# Patient Record
Sex: FEMALE | Race: WHITE | NOT HISPANIC OR LATINO | Employment: OTHER | URBAN - METROPOLITAN AREA
[De-identification: names, ages, dates, MRNs, and addresses within clinical notes are randomized per-mention and may not be internally consistent; named-entity substitution may affect disease eponyms.]

---

## 2017-03-17 ENCOUNTER — ALLSCRIPTS OFFICE VISIT (OUTPATIENT)
Dept: OTHER | Facility: OTHER | Age: 62
End: 2017-03-17

## 2017-03-17 DIAGNOSIS — Z00.00 ENCOUNTER FOR GENERAL ADULT MEDICAL EXAMINATION WITHOUT ABNORMAL FINDINGS: ICD-10-CM

## 2017-03-17 DIAGNOSIS — E11.9 TYPE 2 DIABETES MELLITUS WITHOUT COMPLICATIONS (HCC): ICD-10-CM

## 2017-04-20 ENCOUNTER — GENERIC CONVERSION - ENCOUNTER (OUTPATIENT)
Dept: OTHER | Facility: OTHER | Age: 62
End: 2017-04-20

## 2017-05-18 ENCOUNTER — GENERIC CONVERSION - ENCOUNTER (OUTPATIENT)
Dept: OTHER | Facility: OTHER | Age: 62
End: 2017-05-18

## 2017-05-31 ENCOUNTER — APPOINTMENT (OUTPATIENT)
Dept: LAB | Facility: CLINIC | Age: 62
End: 2017-05-31
Payer: COMMERCIAL

## 2017-05-31 ENCOUNTER — TRANSCRIBE ORDERS (OUTPATIENT)
Dept: LAB | Facility: CLINIC | Age: 62
End: 2017-05-31

## 2017-05-31 DIAGNOSIS — E11.9 TYPE 2 DIABETES MELLITUS WITHOUT COMPLICATIONS (HCC): ICD-10-CM

## 2017-05-31 LAB
ALBUMIN SERPL BCP-MCNC: 3.6 G/DL (ref 3.5–5)
ALP SERPL-CCNC: 129 U/L (ref 46–116)
ALT SERPL W P-5'-P-CCNC: 42 U/L (ref 12–78)
ANION GAP SERPL CALCULATED.3IONS-SCNC: 6 MMOL/L (ref 4–13)
AST SERPL W P-5'-P-CCNC: 24 U/L (ref 5–45)
BASOPHILS # BLD AUTO: 0.03 THOUSANDS/ΜL (ref 0–0.1)
BASOPHILS NFR BLD AUTO: 1 % (ref 0–1)
BILIRUB SERPL-MCNC: 0.66 MG/DL (ref 0.2–1)
BUN SERPL-MCNC: 15 MG/DL (ref 5–25)
CALCIUM SERPL-MCNC: 9.8 MG/DL (ref 8.3–10.1)
CHLORIDE SERPL-SCNC: 105 MMOL/L (ref 100–108)
CHOLEST SERPL-MCNC: 156 MG/DL (ref 50–200)
CO2 SERPL-SCNC: 27 MMOL/L (ref 21–32)
CREAT SERPL-MCNC: 0.79 MG/DL (ref 0.6–1.3)
CREAT UR-MCNC: 39 MG/DL
EOSINOPHIL # BLD AUTO: 0.18 THOUSAND/ΜL (ref 0–0.61)
EOSINOPHIL NFR BLD AUTO: 3 % (ref 0–6)
ERYTHROCYTE [DISTWIDTH] IN BLOOD BY AUTOMATED COUNT: 13.3 % (ref 11.6–15.1)
EST. AVERAGE GLUCOSE BLD GHB EST-MCNC: 148 MG/DL
GFR SERPL CREATININE-BSD FRML MDRD: >60 ML/MIN/1.73SQ M
GLUCOSE P FAST SERPL-MCNC: 126 MG/DL (ref 65–99)
HBA1C MFR BLD: 6.8 % (ref 4.2–6.3)
HCT VFR BLD AUTO: 41.4 % (ref 34.8–46.1)
HDLC SERPL-MCNC: 45 MG/DL (ref 40–60)
HGB BLD-MCNC: 13.7 G/DL (ref 11.5–15.4)
LDLC SERPL CALC-MCNC: 83 MG/DL (ref 0–100)
LYMPHOCYTES # BLD AUTO: 2.16 THOUSANDS/ΜL (ref 0.6–4.47)
LYMPHOCYTES NFR BLD AUTO: 41 % (ref 14–44)
MCH RBC QN AUTO: 31.4 PG (ref 26.8–34.3)
MCHC RBC AUTO-ENTMCNC: 33.1 G/DL (ref 31.4–37.4)
MCV RBC AUTO: 95 FL (ref 82–98)
MICROALBUMIN UR-MCNC: <5 MG/L (ref 0–20)
MICROALBUMIN/CREAT 24H UR: <13 MG/G CREATININE (ref 0–30)
MONOCYTES # BLD AUTO: 0.43 THOUSAND/ΜL (ref 0.17–1.22)
MONOCYTES NFR BLD AUTO: 8 % (ref 4–12)
NEUTROPHILS # BLD AUTO: 2.52 THOUSANDS/ΜL (ref 1.85–7.62)
NEUTS SEG NFR BLD AUTO: 47 % (ref 43–75)
NRBC BLD AUTO-RTO: 0 /100 WBCS
PLATELET # BLD AUTO: 235 THOUSANDS/UL (ref 149–390)
PMV BLD AUTO: 9.7 FL (ref 8.9–12.7)
POTASSIUM SERPL-SCNC: 4.3 MMOL/L (ref 3.5–5.3)
PROT SERPL-MCNC: 7.3 G/DL (ref 6.4–8.2)
RBC # BLD AUTO: 4.36 MILLION/UL (ref 3.81–5.12)
SODIUM SERPL-SCNC: 138 MMOL/L (ref 136–145)
TRIGL SERPL-MCNC: 141 MG/DL
TSH SERPL DL<=0.05 MIU/L-ACNC: 2.23 UIU/ML (ref 0.36–3.74)
WBC # BLD AUTO: 5.33 THOUSAND/UL (ref 4.31–10.16)

## 2017-05-31 PROCEDURE — 80053 COMPREHEN METABOLIC PANEL: CPT

## 2017-05-31 PROCEDURE — 82570 ASSAY OF URINE CREATININE: CPT

## 2017-05-31 PROCEDURE — 84443 ASSAY THYROID STIM HORMONE: CPT

## 2017-05-31 PROCEDURE — 80061 LIPID PANEL: CPT

## 2017-05-31 PROCEDURE — 83036 HEMOGLOBIN GLYCOSYLATED A1C: CPT

## 2017-05-31 PROCEDURE — 82043 UR ALBUMIN QUANTITATIVE: CPT

## 2017-05-31 PROCEDURE — 85025 COMPLETE CBC W/AUTO DIFF WBC: CPT

## 2017-05-31 PROCEDURE — 36415 COLL VENOUS BLD VENIPUNCTURE: CPT

## 2017-06-01 ENCOUNTER — ALLSCRIPTS OFFICE VISIT (OUTPATIENT)
Dept: OTHER | Facility: OTHER | Age: 62
End: 2017-06-01

## 2017-06-02 ENCOUNTER — ALLSCRIPTS OFFICE VISIT (OUTPATIENT)
Dept: OTHER | Facility: OTHER | Age: 62
End: 2017-06-02

## 2017-06-02 DIAGNOSIS — R74.8 ABNORMAL LEVELS OF OTHER SERUM ENZYMES: ICD-10-CM

## 2017-06-02 DIAGNOSIS — Z90.49 ACQUIRED ABSENCE OF OTHER SPECIFIED PARTS OF DIGESTIVE TRACT: ICD-10-CM

## 2017-06-02 DIAGNOSIS — R17 JAUNDICE: ICD-10-CM

## 2017-06-19 ENCOUNTER — ANESTHESIA EVENT (OUTPATIENT)
Dept: GASTROENTEROLOGY | Facility: HOSPITAL | Age: 62
End: 2017-06-19
Payer: COMMERCIAL

## 2017-06-20 ENCOUNTER — ANESTHESIA (OUTPATIENT)
Dept: GASTROENTEROLOGY | Facility: HOSPITAL | Age: 62
End: 2017-06-20
Payer: COMMERCIAL

## 2017-06-20 ENCOUNTER — GENERIC CONVERSION - ENCOUNTER (OUTPATIENT)
Dept: PERIOP | Facility: HOSPITAL | Age: 62
End: 2017-06-20

## 2017-06-20 ENCOUNTER — HOSPITAL ENCOUNTER (OUTPATIENT)
Facility: HOSPITAL | Age: 62
Setting detail: OUTPATIENT SURGERY
Discharge: HOME | End: 2017-06-20
Attending: SURGERY | Admitting: SURGERY
Payer: COMMERCIAL

## 2017-06-20 VITALS
HEIGHT: 66 IN | HEART RATE: 64 BPM | DIASTOLIC BLOOD PRESSURE: 58 MMHG | OXYGEN SATURATION: 98 % | SYSTOLIC BLOOD PRESSURE: 122 MMHG | TEMPERATURE: 97.5 F | BODY MASS INDEX: 34.39 KG/M2 | RESPIRATION RATE: 18 BRPM | WEIGHT: 214 LBS

## 2017-06-20 RX ORDER — FLUTICASONE PROPIONATE 50 MCG
2 SPRAY, SUSPENSION (ML) NASAL DAILY
COMMUNITY
End: 2018-09-17 | Stop reason: SDUPTHER

## 2017-06-20 RX ORDER — CETIRIZINE HYDROCHLORIDE 10 MG/1
10 TABLET ORAL DAILY
COMMUNITY

## 2017-06-20 RX ORDER — PROPOFOL 10 MG/ML
INJECTION, EMULSION INTRAVENOUS AS NEEDED
Status: DISCONTINUED | OUTPATIENT
Start: 2017-06-20 | End: 2017-06-20 | Stop reason: SURG

## 2017-06-20 RX ORDER — SERTRALINE HYDROCHLORIDE 25 MG/1
25 TABLET, FILM COATED ORAL DAILY
COMMUNITY
End: 2018-03-06 | Stop reason: SDUPTHER

## 2017-06-20 RX ORDER — SODIUM CHLORIDE 9 MG/ML
125 INJECTION, SOLUTION INTRAVENOUS CONTINUOUS
Status: DISCONTINUED | OUTPATIENT
Start: 2017-06-20 | End: 2017-06-20 | Stop reason: HOSPADM

## 2017-06-20 RX ORDER — VERAPAMIL HYDROCHLORIDE 120 MG/1
120 TABLET, FILM COATED ORAL DAILY
COMMUNITY
End: 2018-03-06 | Stop reason: SDUPTHER

## 2017-06-20 RX ORDER — LOSARTAN POTASSIUM AND HYDROCHLOROTHIAZIDE 25; 100 MG/1; MG/1
1 TABLET ORAL DAILY
COMMUNITY
End: 2018-03-06 | Stop reason: SDUPTHER

## 2017-06-20 RX ORDER — FAMOTIDINE 20 MG/1
20 TABLET, FILM COATED ORAL DAILY PRN
COMMUNITY

## 2017-06-20 RX ADMIN — PROPOFOL 20 MG: 10 INJECTION, EMULSION INTRAVENOUS at 10:41

## 2017-06-20 RX ADMIN — PROPOFOL 20 MG: 10 INJECTION, EMULSION INTRAVENOUS at 10:45

## 2017-06-20 RX ADMIN — PROPOFOL 20 MG: 10 INJECTION, EMULSION INTRAVENOUS at 10:48

## 2017-06-20 RX ADMIN — PROPOFOL 50 MG: 10 INJECTION, EMULSION INTRAVENOUS at 10:39

## 2017-06-20 RX ADMIN — PROPOFOL 50 MG: 10 INJECTION, EMULSION INTRAVENOUS at 10:38

## 2017-06-20 RX ADMIN — PROPOFOL 20 MG: 10 INJECTION, EMULSION INTRAVENOUS at 10:49

## 2017-06-20 RX ADMIN — PROPOFOL 20 MG: 10 INJECTION, EMULSION INTRAVENOUS at 10:43

## 2017-06-20 RX ADMIN — SODIUM CHLORIDE 125 ML/HR: 0.9 INJECTION, SOLUTION INTRAVENOUS at 10:20

## 2017-06-20 RX ADMIN — PROPOFOL 50 MG: 10 INJECTION, EMULSION INTRAVENOUS at 10:37

## 2017-06-20 RX ADMIN — PROPOFOL 10 MG: 10 INJECTION, EMULSION INTRAVENOUS at 10:40

## 2017-06-21 ENCOUNTER — GENERIC CONVERSION - ENCOUNTER (OUTPATIENT)
Dept: OTHER | Facility: OTHER | Age: 62
End: 2017-06-21

## 2017-06-26 ENCOUNTER — APPOINTMENT (OUTPATIENT)
Dept: RADIOLOGY | Facility: CLINIC | Age: 62
End: 2017-06-26
Payer: COMMERCIAL

## 2017-06-26 ENCOUNTER — APPOINTMENT (OUTPATIENT)
Dept: LAB | Facility: CLINIC | Age: 62
End: 2017-06-26
Payer: COMMERCIAL

## 2017-06-26 ENCOUNTER — TRANSCRIBE ORDERS (OUTPATIENT)
Dept: LAB | Facility: CLINIC | Age: 62
End: 2017-06-26

## 2017-06-26 DIAGNOSIS — E11.9 TYPE 2 DIABETES MELLITUS WITHOUT COMPLICATIONS (HCC): ICD-10-CM

## 2017-06-26 DIAGNOSIS — R17 JAUNDICE: ICD-10-CM

## 2017-06-26 DIAGNOSIS — I34.0 NONRHEUMATIC MITRAL VALVE INSUFFICIENCY: ICD-10-CM

## 2017-06-26 DIAGNOSIS — R74.8 ABNORMAL LEVELS OF OTHER SERUM ENZYMES: ICD-10-CM

## 2017-06-26 DIAGNOSIS — R74.8 OTHER NONSPECIFIC ABNORMAL SERUM ENZYME LEVELS: ICD-10-CM

## 2017-06-26 DIAGNOSIS — E87.1 HYPOSMOLALITY AND/OR HYPONATREMIA: ICD-10-CM

## 2017-06-26 DIAGNOSIS — E87.1 HYPO-OSMOLALITY AND HYPONATREMIA: ICD-10-CM

## 2017-06-26 DIAGNOSIS — E11.9 DIABETES MELLITUS WITH NO COMPLICATION (HCC): Primary | ICD-10-CM

## 2017-06-26 DIAGNOSIS — IMO0001: ICD-10-CM

## 2017-06-26 DIAGNOSIS — I34.1 J.B. BARLOW'S SYNDROME: ICD-10-CM

## 2017-06-26 DIAGNOSIS — I10 ESSENTIAL (PRIMARY) HYPERTENSION: ICD-10-CM

## 2017-06-26 DIAGNOSIS — I10 ESSENTIAL HYPERTENSION, MALIGNANT: ICD-10-CM

## 2017-06-26 LAB
APTT PPP: 25 SECONDS (ref 23–35)
GGT SERPL-CCNC: 29 U/L (ref 5–85)
INR PPP: 0.97 (ref 0.86–1.16)
PROTHROMBIN TIME: 12.9 SECONDS (ref 12.1–14.4)

## 2017-06-26 PROCEDURE — 86256 FLUORESCENT ANTIBODY TITER: CPT

## 2017-06-26 PROCEDURE — 86704 HEP B CORE ANTIBODY TOTAL: CPT

## 2017-06-26 PROCEDURE — 80074 ACUTE HEPATITIS PANEL: CPT

## 2017-06-26 PROCEDURE — 85730 THROMBOPLASTIN TIME PARTIAL: CPT

## 2017-06-26 PROCEDURE — 86255 FLUORESCENT ANTIBODY SCREEN: CPT

## 2017-06-26 PROCEDURE — 82977 ASSAY OF GGT: CPT

## 2017-06-26 PROCEDURE — 86038 ANTINUCLEAR ANTIBODIES: CPT

## 2017-06-26 PROCEDURE — 36415 COLL VENOUS BLD VENIPUNCTURE: CPT

## 2017-06-26 PROCEDURE — 85610 PROTHROMBIN TIME: CPT

## 2017-06-26 PROCEDURE — 71020 HB CHEST X-RAY 2VW FRONTAL&LATL: CPT

## 2017-06-26 PROCEDURE — 86235 NUCLEAR ANTIGEN ANTIBODY: CPT

## 2017-06-27 LAB
ACTIN IGG SERPL-ACNC: 11 UNITS (ref 0–19)
HAV IGM SER QL: NORMAL
HBV CORE AB SER QL: NORMAL
HBV CORE IGM SER QL: NORMAL
HBV CORE IGM SER QL: NORMAL
HBV SURFACE AG SER QL: NORMAL
HBV SURFACE AG SER QL: NORMAL
HCV AB SER QL: NORMAL
HCV AB SER QL: NORMAL
MITOCHONDRIA M2 IGG SER-ACNC: 2 UNITS (ref 0–20)

## 2017-06-28 LAB
C-ANCA TITR SER IF: NORMAL TITER
MYELOPEROXIDASE AB SER IA-ACNC: <9 U/ML (ref 0–9)
P-ANCA ATYPICAL TITR SER IF: NORMAL TITER
P-ANCA TITR SER IF: NORMAL TITER
PROTEINASE3 AB SER IA-ACNC: <3.5 U/ML (ref 0–3.5)
RYE IGE QN: NEGATIVE

## 2017-07-11 ENCOUNTER — GENERIC CONVERSION - ENCOUNTER (OUTPATIENT)
Dept: OTHER | Facility: OTHER | Age: 62
End: 2017-07-11

## 2017-07-13 ENCOUNTER — HOSPITAL ENCOUNTER (OUTPATIENT)
Dept: PULMONOLOGY | Facility: HOSPITAL | Age: 62
Discharge: HOME | End: 2017-07-13
Payer: COMMERCIAL

## 2017-07-13 ENCOUNTER — GENERIC CONVERSION - ENCOUNTER (OUTPATIENT)
Dept: OTHER | Facility: OTHER | Age: 62
End: 2017-07-13

## 2017-07-13 ENCOUNTER — OFFICE VISIT (OUTPATIENT)
Dept: LAB | Facility: HOSPITAL | Age: 62
End: 2017-07-13
Payer: COMMERCIAL

## 2017-07-13 ENCOUNTER — HOSPITAL ENCOUNTER (OUTPATIENT)
Dept: RADIOLOGY | Facility: HOSPITAL | Age: 62
Discharge: HOME | End: 2017-07-13
Payer: COMMERCIAL

## 2017-07-13 DIAGNOSIS — E87.1 HYPOSMOLALITY AND/OR HYPONATREMIA: ICD-10-CM

## 2017-07-13 DIAGNOSIS — IMO0001: ICD-10-CM

## 2017-07-13 DIAGNOSIS — R17 JAUNDICE: ICD-10-CM

## 2017-07-13 DIAGNOSIS — R74.8 ABNORMAL LEVELS OF OTHER SERUM ENZYMES: ICD-10-CM

## 2017-07-13 DIAGNOSIS — I34.1 J.B. BARLOW'S SYNDROME: ICD-10-CM

## 2017-07-13 DIAGNOSIS — E11.9 DIABETES MELLITUS WITH NO COMPLICATION (HCC): ICD-10-CM

## 2017-07-13 DIAGNOSIS — Z90.49 ACQUIRED ABSENCE OF OTHER SPECIFIED PARTS OF DIGESTIVE TRACT: ICD-10-CM

## 2017-07-13 DIAGNOSIS — I10 ESSENTIAL HYPERTENSION, MALIGNANT: ICD-10-CM

## 2017-07-13 LAB
ATRIAL RATE: 63 BPM
P AXIS: 40 DEGREES
PR INTERVAL: 178 MS
QRS AXIS: 31 DEGREES
QRSD INTERVAL: 82 MS
QT INTERVAL: 404 MS
QTC INTERVAL: 413 MS
T WAVE AXIS: 14 DEGREES
VENTRICULAR RATE: 63 BPM

## 2017-07-13 PROCEDURE — 94760 N-INVAS EAR/PLS OXIMETRY 1: CPT

## 2017-07-13 PROCEDURE — 76700 US EXAM ABDOM COMPLETE: CPT

## 2017-07-13 PROCEDURE — 94729 DIFFUSING CAPACITY: CPT

## 2017-07-13 PROCEDURE — 93005 ELECTROCARDIOGRAM TRACING: CPT

## 2017-07-13 PROCEDURE — 94726 PLETHYSMOGRAPHY LUNG VOLUMES: CPT

## 2017-07-13 PROCEDURE — 94010 BREATHING CAPACITY TEST: CPT

## 2017-09-13 ENCOUNTER — ALLSCRIPTS OFFICE VISIT (OUTPATIENT)
Dept: OTHER | Facility: OTHER | Age: 62
End: 2017-09-13

## 2017-09-13 DIAGNOSIS — E11.9 TYPE 2 DIABETES MELLITUS WITHOUT COMPLICATIONS (HCC): ICD-10-CM

## 2017-09-13 DIAGNOSIS — I10 ESSENTIAL (PRIMARY) HYPERTENSION: ICD-10-CM

## 2018-01-09 NOTE — MISCELLANEOUS
Message  Mailed colono letter to patients home address  Tasked PCPs office  {Updated pre-visit planning to reflect recommended 5 year follow up  }      Active Problems    1  Alkaline phosphatase elevation (790 5) (R74 8)   2  Chronic pain of right ankle (719 47,338 29) (M25 571,G89 29)   3  Colon cancer screening (V76 51) (Z12 11)   4  De Quervain's tenosynovitis, left (727 04) (M65 4)   5  Depression (311) (F32 9)   6  Diverticulosis (562 10) (K57 90)   7  DMII (diabetes mellitus, type 2) (250 00) (E11 9)   8  Elevated bilirubin (277 4) (R17)   9  Encounter for routine gynecological examination (V72 31) (Z01 419)   10  Encounter for screening for malignant neoplasm of cervix (V76 2) (Z12 4)   11  Esophageal reflux (530 81) (K21 9)   12  Fracture of right heel (825 0) (S92 001A)   13  Hypertension (401 9) (I10)   14  Hyponatremia (276 1) (E87 1)   15  Joint derangement of hand (718 94) (M24 9)   16  Left ventricular hypertrophy (429 3) (I51 7)   17  Need for influenza vaccination (V04 81) (Z23)   18  Need for pneumococcal vaccination (V03 82) (Z23)   19  Need for prophylactic vaccination and inoculation against influenza (V04 81) (Z23)   20  Need for Tdap vaccination (V06 1) (Z23)   21  Onychomycosis (110 1) (B35 1)   22  Seasonal allergic rhinitis (477 9) (J30 2)   23  Secondary mitral valve insufficiency (424 0) (I34 0)   24  Status post cholecystectomy (V45 79) (Z90 49)   25  Stiffness of right ankle joint (719 57) (M25 671)   26  Uterovaginal prolapse (618 4) (N81 4)   27  Visit for screening mammogram (V76 12) (Z12 31)   28  Wrist pain (719 43) (M25 539)    Current Meds   1  Calcium TABS; Therapy: (Recorded:26Mar2014) to Recorded   2  Fluticasone Propionate 50 MCG/ACT Nasal Suspension; USE 2 SPRAYS IN EACH   NOSTRIL ONCE DAILY; Therapy: 53RHT4996 to (Evaluate:08Mar2017)  Requested for: 80PYZ8565; Last   Rx:49Dja1217 Ordered   3   Losartan Potassium-HCTZ 100-25 MG Oral Tablet; TAKE 1 TABLET DAILY; Therapy: 20Hlp9129 to (Evaluate:13Sep2017)  Requested for: 79KEZ5536; Last   Rx:17Mar2017 Ordered   4  Lumigan 0 01 % Ophthalmic Solution; Therapy: (Recorded:01Jun2017) to Recorded   5  MoviPrep 100 GM Oral Solution Reconstituted; USE AS DIRECTED; Therapy: 16JNC0669 to (Last Rx:01Jun2017) Ordered   6  One Daily Womens Oral Tablet; Therapy: (Recorded:26Mar2014) to Recorded   7  Pepcid AC TABS; Therapy: (Recorded:26Mar2014) to Recorded   8  Sertraline HCl - 50 MG Oral Tablet; TAKE 1 TABLET Bedtime; Therapy: 76ERU1749 to (Evaluate:15Jun2017)  Requested for: 56XSI9292; Last   Rx:17Mar2017 Ordered   9  Verapamil HCl  MG Oral Tablet Extended Release; TAKE ONE TABLET BY   MOUTH ONCE DAILY; Therapy: 53Cft1183 to (Evaluate:13Sep2017)  Requested for: 11MIQ4409; Last   Rx:17Mar2017 Ordered   10  Vitamin B Complex Oral Tablet; Therapy: (Recorded:26Mar2014) to Recorded   11  Voltaren 1 % Transdermal Gel (Diclofenac Sodium); APPLY SPARINGLY TO AFFECTED    AREA(S) ONCE DAILY  Requested for: 35Cwz5693; Last Rx:03Sep2015 Ordered    Allergies    1  No Known Drug Allergies    Plan  Colon cancer screening    · COLONOSCOPY (GI, SURG); Status:Complete - Retrospective By Protocol  Authorization;   Done: 46XFT9711 12:00AM   · COLONOSCOPY (GI, SURG) ; every 5 years;  Last 20Jun2017; Next 21VAX4580;  Status:Active    Signatures   Electronically signed by : Eliel Kenny, ; Jul 11 2017  4:49PM EST                       (Author)

## 2018-01-13 VITALS
HEIGHT: 65 IN | SYSTOLIC BLOOD PRESSURE: 140 MMHG | WEIGHT: 212 LBS | HEART RATE: 66 BPM | TEMPERATURE: 98.2 F | DIASTOLIC BLOOD PRESSURE: 86 MMHG | BODY MASS INDEX: 35.32 KG/M2 | OXYGEN SATURATION: 98 %

## 2018-01-13 VITALS
SYSTOLIC BLOOD PRESSURE: 130 MMHG | HEIGHT: 66 IN | RESPIRATION RATE: 16 BRPM | HEART RATE: 74 BPM | DIASTOLIC BLOOD PRESSURE: 74 MMHG | WEIGHT: 211 LBS | TEMPERATURE: 98.3 F | BODY MASS INDEX: 33.91 KG/M2

## 2018-01-13 VITALS
OXYGEN SATURATION: 97 % | BODY MASS INDEX: 33.95 KG/M2 | DIASTOLIC BLOOD PRESSURE: 70 MMHG | HEART RATE: 86 BPM | TEMPERATURE: 98.5 F | WEIGHT: 211.25 LBS | SYSTOLIC BLOOD PRESSURE: 140 MMHG | HEIGHT: 66 IN | RESPIRATION RATE: 16 BRPM

## 2018-01-14 VITALS
WEIGHT: 213 LBS | TEMPERATURE: 98 F | HEART RATE: 70 BPM | BODY MASS INDEX: 34.23 KG/M2 | SYSTOLIC BLOOD PRESSURE: 134 MMHG | DIASTOLIC BLOOD PRESSURE: 78 MMHG | HEIGHT: 66 IN | OXYGEN SATURATION: 98 %

## 2018-03-06 ENCOUNTER — OFFICE VISIT (OUTPATIENT)
Dept: FAMILY MEDICINE CLINIC | Facility: CLINIC | Age: 63
End: 2018-03-06
Payer: COMMERCIAL

## 2018-03-06 VITALS
BODY MASS INDEX: 35.89 KG/M2 | SYSTOLIC BLOOD PRESSURE: 128 MMHG | RESPIRATION RATE: 17 BRPM | OXYGEN SATURATION: 97 % | DIASTOLIC BLOOD PRESSURE: 82 MMHG | HEART RATE: 70 BPM | TEMPERATURE: 98 F | HEIGHT: 64 IN | WEIGHT: 210.2 LBS

## 2018-03-06 DIAGNOSIS — Z00.00 ANNUAL PHYSICAL EXAM: ICD-10-CM

## 2018-03-06 DIAGNOSIS — F32.A DEPRESSION, UNSPECIFIED DEPRESSION TYPE: ICD-10-CM

## 2018-03-06 DIAGNOSIS — I10 ESSENTIAL HYPERTENSION: Primary | ICD-10-CM

## 2018-03-06 PROCEDURE — 99396 PREV VISIT EST AGE 40-64: CPT | Performed by: FAMILY MEDICINE

## 2018-03-06 RX ORDER — BIMATOPROST 0.01 %
DROPS OPHTHALMIC (EYE)
COMMUNITY
Start: 2017-11-27 | End: 2018-11-28

## 2018-03-06 RX ORDER — VERAPAMIL HYDROCHLORIDE 120 MG/1
120 TABLET, FILM COATED ORAL DAILY
Qty: 90 TABLET | Refills: 1 | Status: SHIPPED | OUTPATIENT
Start: 2018-03-06 | End: 2018-03-11 | Stop reason: CLARIF

## 2018-03-06 RX ORDER — SERTRALINE HYDROCHLORIDE 25 MG/1
50 TABLET, FILM COATED ORAL DAILY
Qty: 90 TABLET | Refills: 1 | Status: SHIPPED | OUTPATIENT
Start: 2018-03-06 | End: 2018-04-30 | Stop reason: SDUPTHER

## 2018-03-06 RX ORDER — LOSARTAN POTASSIUM AND HYDROCHLOROTHIAZIDE 25; 100 MG/1; MG/1
1 TABLET ORAL DAILY
Qty: 90 TABLET | Refills: 1 | Status: SHIPPED | OUTPATIENT
Start: 2018-03-06 | End: 2018-11-28 | Stop reason: SDUPTHER

## 2018-03-06 NOTE — PROGRESS NOTES
Assessment/Plan:       Diagnoses and all orders for this visit:    Essential hypertension  -     verapamil (CALAN) 120 mg tablet; Take 1 tablet (120 mg total) by mouth daily  -     losartan-hydrochlorothiazide (HYZAAR) 100-25 MG per tablet; Take 1 tablet by mouth daily    Depression, unspecified depression type  -     sertraline (ZOLOFT) 25 mg tablet; Take 2 tablets (50 mg total) by mouth daily    Annual physical exam    Other orders  -     LUMIGAN 0 01 % ophthalmic drops;   -     diclofenac sodium (VOLTAREN) 1 %; Place on the skin daily          Subjective:   Chief Complaint   Patient presents with    Annual Exam        Patient ID: Yobani Mitchell is a 58 y o  female  Per c/c reviewed by me  Sad because she had to get her mom into a personal care home- was still living alone in her 80's        The following portions of the patient's history were reviewed and updated as appropriate: allergies, current medications, past family history, past medical history, past social history, past surgical history and problem list     Review of Systems   Constitutional: Negative  HENT: Negative  Eyes: Negative  Respiratory: Negative  Cardiovascular: Negative  Endocrine: Negative  Genitourinary: Negative  Musculoskeletal: Positive for joint swelling (still post-traumatic ankle swelling on and off)  Negative for gait problem, neck pain and neck stiffness  Skin: Negative  Neurological: Negative  Hematological: Negative  Psychiatric/Behavioral: Negative  Except per hpi         Objective:      /82 (BP Location: Left arm, Patient Position: Sitting, Cuff Size: Standard)   Pulse 70   Temp 98 °F (36 7 °C)   Resp 17   Ht 5' 4" (1 626 m)   Wt 95 3 kg (210 lb 3 2 oz)   SpO2 97%   BMI 36 08 kg/m²          Physical Exam   Constitutional: She is oriented to person, place, and time  She appears well-developed and well-nourished  She is cooperative  Non-toxic appearance   She does not have a sickly appearance  She does not appear ill  No distress  HENT:   Head: Normocephalic and atraumatic  Right Ear: Hearing, tympanic membrane, external ear and ear canal normal    Left Ear: Hearing, tympanic membrane, external ear and ear canal normal    Nose: Nose normal    Mouth/Throat: Uvula is midline, oropharynx is clear and moist and mucous membranes are normal    Eyes: Conjunctivae, EOM and lids are normal  Pupils are equal, round, and reactive to light  Neck: Trachea normal  Neck supple  No JVD present  Carotid bruit is not present  No thyroid mass and no thyromegaly present  Cardiovascular: Normal rate, regular rhythm, normal heart sounds and normal pulses  Pulmonary/Chest: Effort normal and breath sounds normal    Abdominal: Soft  Normal appearance and bowel sounds are normal  There is no hepatosplenomegaly  There is no tenderness  No hernia  Musculoskeletal:        Cervical back: Normal         Thoracic back: Normal         Lumbar back: Normal    Lymphadenopathy:     She has no cervical adenopathy  She has no axillary adenopathy  Neurological: She is alert and oriented to person, place, and time  She has normal strength and normal reflexes  No cranial nerve deficit or sensory deficit  She displays a negative Romberg sign  Gait normal    Skin: Skin is warm and dry  No rash noted  She is not diaphoretic  No cyanosis  Nails show no clubbing  Psychiatric: She has a normal mood and affect  Her behavior is normal  Judgment and thought content normal  Cognition and memory are normal    Nursing note and vitals reviewed

## 2018-03-08 PROBLEM — F32.A DEPRESSION: Status: ACTIVE | Noted: 2018-03-08

## 2018-03-08 PROBLEM — I10 ESSENTIAL HYPERTENSION: Status: ACTIVE | Noted: 2018-03-08

## 2018-03-08 PROBLEM — Z00.00 ANNUAL PHYSICAL EXAM: Status: ACTIVE | Noted: 2018-03-08

## 2018-03-09 ENCOUNTER — TELEPHONE (OUTPATIENT)
Dept: FAMILY MEDICINE CLINIC | Facility: CLINIC | Age: 63
End: 2018-03-09

## 2018-03-09 NOTE — TELEPHONE ENCOUNTER
Pt called and lm on vm stating she picked up her Verapamil 120mg at [facility] and when she got home it is the wrong rx  Pt stated she is suppose to be on Verapamil 12mg Er  Pt would like a new rx sent to [facility]

## 2018-03-11 DIAGNOSIS — I10 ESSENTIAL HYPERTENSION: Primary | ICD-10-CM

## 2018-03-11 RX ORDER — VERAPAMIL HYDROCHLORIDE 120 MG/1
120 CAPSULE, EXTENDED RELEASE ORAL
Qty: 90 CAPSULE | Refills: 0 | Status: SHIPPED | OUTPATIENT
Start: 2018-03-11 | End: 2018-06-14 | Stop reason: SDUPTHER

## 2018-03-12 NOTE — TELEPHONE ENCOUNTER
in EPIC wrong- clearly is verapamil ER in allscripts   I corrected and please have pt return the wrong med to pharmacy

## 2018-04-30 ENCOUNTER — TELEPHONE (OUTPATIENT)
Dept: FAMILY MEDICINE CLINIC | Facility: CLINIC | Age: 63
End: 2018-04-30

## 2018-04-30 DIAGNOSIS — F32.A DEPRESSION, UNSPECIFIED DEPRESSION TYPE: ICD-10-CM

## 2018-04-30 NOTE — TELEPHONE ENCOUNTER
Pt called over the weekend and lm on  stating he Sertraline was sent over 25mg BID and not 50mg 1 daily  It also needs to be a 90 day supply

## 2018-04-30 NOTE — TELEPHONE ENCOUNTER
Looks like med was put into EPIC incorrectly at change-over   I switched it back to the 50mg tablets with new order to pharmacy

## 2018-06-14 ENCOUNTER — OFFICE VISIT (OUTPATIENT)
Dept: FAMILY MEDICINE CLINIC | Facility: CLINIC | Age: 63
End: 2018-06-14
Payer: COMMERCIAL

## 2018-06-14 VITALS
SYSTOLIC BLOOD PRESSURE: 132 MMHG | HEART RATE: 80 BPM | BODY MASS INDEX: 33.75 KG/M2 | HEIGHT: 66 IN | DIASTOLIC BLOOD PRESSURE: 86 MMHG | TEMPERATURE: 98.5 F | WEIGHT: 210 LBS

## 2018-06-14 DIAGNOSIS — B36.9 FUNGAL DERMATITIS: Primary | ICD-10-CM

## 2018-06-14 DIAGNOSIS — I10 ESSENTIAL HYPERTENSION: ICD-10-CM

## 2018-06-14 DIAGNOSIS — L50.9 URTICARIAL RASH: ICD-10-CM

## 2018-06-14 PROCEDURE — 99213 OFFICE O/P EST LOW 20 MIN: CPT | Performed by: PHYSICIAN ASSISTANT

## 2018-06-14 PROCEDURE — 3008F BODY MASS INDEX DOCD: CPT | Performed by: PHYSICIAN ASSISTANT

## 2018-06-14 PROCEDURE — 3079F DIAST BP 80-89 MM HG: CPT | Performed by: PHYSICIAN ASSISTANT

## 2018-06-14 PROCEDURE — 3075F SYST BP GE 130 - 139MM HG: CPT | Performed by: PHYSICIAN ASSISTANT

## 2018-06-14 RX ORDER — VERAPAMIL HYDROCHLORIDE 120 MG/1
120 CAPSULE, EXTENDED RELEASE ORAL
Qty: 90 CAPSULE | Refills: 1 | Status: SHIPPED | OUTPATIENT
Start: 2018-06-14 | End: 2018-11-28

## 2018-06-14 RX ORDER — KETOCONAZOLE 20 MG/G
CREAM TOPICAL DAILY
Qty: 30 G | Refills: 0 | Status: SHIPPED | OUTPATIENT
Start: 2018-06-14 | End: 2018-11-28

## 2018-06-14 RX ORDER — HYDROXYZINE HYDROCHLORIDE 25 MG/1
25 TABLET, FILM COATED ORAL EVERY 6 HOURS PRN
Qty: 30 TABLET | Refills: 0 | Status: SHIPPED | OUTPATIENT
Start: 2018-06-14 | End: 2018-11-28

## 2018-06-14 RX ORDER — PREDNISONE 20 MG/1
TABLET ORAL
Qty: 11 TABLET | Refills: 0 | Status: SHIPPED | OUTPATIENT
Start: 2018-06-14 | End: 2018-11-28

## 2018-06-14 NOTE — PATIENT INSTRUCTIONS
Please complete oral course of prednisone with food and water  Continue zyrtec and as needed benadryl  You may try hydroxyzine as needed for itching in replacement of benadryl but not together  You can restart pepcid in addition for zyrtec  Apply the antifungal cream to groin and try to keep the area dry  You can try Desitin powder over the counter

## 2018-06-14 NOTE — PROGRESS NOTES
Assessment/Plan:      Diagnoses and all orders for this visit:    Fungal dermatitis  -     ketoconazole (NIZORAL) 2 % cream; Apply topically daily    Essential hypertension  -     verapamil (VERELAN PM) 120 MG 24 hr capsule; Take 1 capsule (120 mg total) by mouth daily at bedtime    Urticarial rash  -     predniSONE 20 mg tablet; Take 2 tabs x 3 days, then 1 tab x 3 days, then 1/2 tab x 2 days  -     hydrOXYzine HCL (ATARAX) 25 mg tablet; Take 1 tablet (25 mg total) by mouth every 6 (six) hours as needed for itching or allergies          Subjective:     Patient ID: Priscilla Montes is a 61 y o  female  Chief Complaint   Patient presents with    Urticaria     1xday       HPI   Patient is a 60 yo female who presents with diffuse itchiness and rash in groin  Patient states that she started with urticarial rash across chest, neck, ears, arms and now spreading to breasts over the weekend  She did notice small red dots but has improved however the itchiness has been "driving me crazy, I could not stand it last night"  She also started with red, itchy rash underneath panus of groin  She takes daily zyrtec and prn benadryl but symptoms persist  She was given triamcinolone cream in the past from a dermatologist and has been using that but she is still very itchy and spreading to scalp  No new lotions, detergents, creams, soaps, shampoos  She was out in sun on Saturday but did not get burnt  She was worried about gypsy mouth caterpillars or poison  She denies fever, chills, sore throat  Review of Systems   Constitutional: Negative for activity change, appetite change, chills and fever  HENT: Negative for congestion, ear pain and sore throat  Respiratory: Negative for cough, chest tightness, shortness of breath and wheezing  Cardiovascular: Negative for chest pain and palpitations  Gastrointestinal: Negative for abdominal pain, diarrhea, nausea and vomiting     Genitourinary: Negative for difficulty urinating, dysuria and hematuria  Skin: Positive for rash  Negative for pallor and wound  Allergic/Immunologic: Positive for environmental allergies  Neurological: Negative for dizziness, syncope, weakness and light-headedness  Hematological: Negative for adenopathy  Does not bruise/bleed easily         Patient Active Problem List    Diagnosis Date Noted    Essential hypertension 03/08/2018    Depression 03/08/2018    Annual physical exam 03/08/2018    DMII (diabetes mellitus, type 2) ([facility] [address] ) 09/09/2016    Elevated bilirubin 09/09/2016    Seasonal allergic rhinitis 02/25/2016    Alkaline phosphatase elevation 05/12/2015    Chronic pain of right ankle 03/19/2015    Fracture of right heel 03/19/2015    Left ventricular hypertrophy 06/05/2014    De Quervain's tenosynovitis, left 05/23/2014    Uterovaginal prolapse 03/26/2014    Diverticulosis 07/30/2012    Esophageal reflux 07/30/2012    Secondary mitral valve insufficiency 07/30/2012     Current Outpatient Prescriptions:     B Complex-C (SUPER B-C PO), Take by mouth, Disp: , Rfl:     Bimatoprost (LUMIGAN OP), Apply 1 drop to eye daily at bedtime, Disp: , Rfl:     CALCIUM-VITAMIN D PO, Take 1 tablet by mouth daily, Disp: , Rfl:     cetirizine (ZyrTEC) 10 mg tablet, Take 10 mg by mouth daily, Disp: , Rfl:     diclofenac sodium (VOLTAREN) 1 %, Place on the skin daily, Disp: , Rfl:     famotidine (PEPCID) 20 mg tablet, Take 20 mg by mouth 2 (two) times a day, Disp: , Rfl:     fluticasone (FLONASE) 50 mcg/act nasal spray, 2 sprays into each nostril daily, Disp: , Rfl:     hydrOXYzine HCL (ATARAX) 25 mg tablet, Take 1 tablet (25 mg total) by mouth every 6 (six) hours as needed for itching or allergies, Disp: 30 tablet, Rfl: 0    ketoconazole (NIZORAL) 2 % cream, Apply topically daily, Disp: 30 g, Rfl: 0    losartan-hydrochlorothiazide (HYZAAR) 100-25 MG per tablet, Take 1 tablet by mouth daily, Disp: 90 tablet, Rfl: 1    LUMIGAN 0 01 % ophthalmic drops, , Disp: , Rfl:     Multiple Vitamins-Minerals (MULTIVITAMIN ADULT PO), Take by mouth, Disp: , Rfl:     predniSONE 20 mg tablet, Take 2 tabs x 3 days, then 1 tab x 3 days, then 1/2 tab x 2 days  , Disp: 11 tablet, Rfl: 0    sertraline (ZOLOFT) 50 mg tablet, Take 1 tablet (50 mg total) by mouth daily, Disp: 90 tablet, Rfl: 1    verapamil (VERELAN PM) 120 MG 24 hr capsule, Take 1 capsule (120 mg total) by mouth daily at bedtime, Disp: 90 capsule, Rfl: 1      Objective:  Vitals:    06/14/18 1259   BP: 132/86   Pulse: 80   Temp: 98 5 °F (36 9 °C)        Physical Exam   Constitutional: She is oriented to person, place, and time  She appears well-developed and well-nourished  No distress  HENT:   Head: Normocephalic and atraumatic  Right Ear: External ear normal    Left Ear: External ear normal    Nose: Nose normal    Mouth/Throat: Oropharynx is clear and moist    Eyes: Conjunctivae are normal  Pupils are equal, round, and reactive to light  Neck: Normal range of motion  Neck supple  Cardiovascular: Normal rate, regular rhythm, normal heart sounds and intact distal pulses  No murmur heard  Pulmonary/Chest: Effort normal and breath sounds normal  No respiratory distress  She has no wheezes  Musculoskeletal: She exhibits no edema  Lymphadenopathy:     She has no cervical adenopathy  Neurological: She is alert and oriented to person, place, and time  No cranial nerve deficit  Skin: She is not diaphoretic  Fungal rash along R panus between skin folds  Pinpoint papular lesions along chest and once on left upper arm; does not appear like hives or poison    Psychiatric: She has a normal mood and affect   Her behavior is normal

## 2018-09-17 DIAGNOSIS — J30.1 ALLERGIC RHINITIS DUE TO POLLEN, UNSPECIFIED SEASONALITY: Primary | ICD-10-CM

## 2018-09-21 RX ORDER — FLUTICASONE PROPIONATE 50 MCG
2 SPRAY, SUSPENSION (ML) NASAL DAILY
Qty: 16 G | Refills: 3 | Status: SHIPPED | OUTPATIENT
Start: 2018-09-21 | End: 2020-01-10 | Stop reason: SDUPTHER

## 2018-11-21 RX ORDER — INFLUENZA VIRUS VACCINE 15; 15; 15; 15 UG/.5ML; UG/.5ML; UG/.5ML; UG/.5ML
SUSPENSION INTRAMUSCULAR
COMMUNITY
Start: 2018-10-18 | End: 2018-11-28

## 2018-11-28 ENCOUNTER — TELEPHONE (OUTPATIENT)
Dept: FAMILY MEDICINE CLINIC | Facility: CLINIC | Age: 63
End: 2018-11-28

## 2018-11-28 ENCOUNTER — APPOINTMENT (OUTPATIENT)
Dept: LAB | Facility: CLINIC | Age: 63
End: 2018-11-28
Payer: COMMERCIAL

## 2018-11-28 ENCOUNTER — OFFICE VISIT (OUTPATIENT)
Dept: FAMILY MEDICINE CLINIC | Facility: CLINIC | Age: 63
End: 2018-11-28
Payer: COMMERCIAL

## 2018-11-28 VITALS
HEIGHT: 66 IN | SYSTOLIC BLOOD PRESSURE: 124 MMHG | OXYGEN SATURATION: 96 % | HEART RATE: 64 BPM | TEMPERATURE: 97.8 F | BODY MASS INDEX: 32.82 KG/M2 | WEIGHT: 204.2 LBS | DIASTOLIC BLOOD PRESSURE: 78 MMHG

## 2018-11-28 DIAGNOSIS — Z13.220 LIPID SCREENING: ICD-10-CM

## 2018-11-28 DIAGNOSIS — F32.A DEPRESSION, UNSPECIFIED DEPRESSION TYPE: ICD-10-CM

## 2018-11-28 DIAGNOSIS — E11.9 TYPE 2 DIABETES MELLITUS WITHOUT COMPLICATION, WITHOUT LONG-TERM CURRENT USE OF INSULIN (HCC): Primary | ICD-10-CM

## 2018-11-28 DIAGNOSIS — Z13.29 THYROID DISORDER SCREEN: ICD-10-CM

## 2018-11-28 DIAGNOSIS — I10 ESSENTIAL HYPERTENSION: ICD-10-CM

## 2018-11-28 DIAGNOSIS — E11.9 TYPE 2 DIABETES MELLITUS WITHOUT COMPLICATION, WITHOUT LONG-TERM CURRENT USE OF INSULIN (HCC): ICD-10-CM

## 2018-11-28 LAB
ALBUMIN SERPL BCP-MCNC: 3.8 G/DL (ref 3.5–5)
ALP SERPL-CCNC: 140 U/L (ref 46–116)
ALT SERPL W P-5'-P-CCNC: 44 U/L (ref 12–78)
ANION GAP SERPL CALCULATED.3IONS-SCNC: 7 MMOL/L (ref 4–13)
AST SERPL W P-5'-P-CCNC: 24 U/L (ref 5–45)
BASOPHILS # BLD AUTO: 0.04 THOUSANDS/ΜL (ref 0–0.1)
BASOPHILS NFR BLD AUTO: 1 % (ref 0–1)
BILIRUB SERPL-MCNC: 0.91 MG/DL (ref 0.2–1)
BUN SERPL-MCNC: 17 MG/DL (ref 5–25)
CALCIUM SERPL-MCNC: 9.6 MG/DL (ref 8.3–10.1)
CHLORIDE SERPL-SCNC: 104 MMOL/L (ref 100–108)
CHOLEST SERPL-MCNC: 167 MG/DL (ref 50–200)
CO2 SERPL-SCNC: 26 MMOL/L (ref 21–32)
CREAT SERPL-MCNC: 0.73 MG/DL (ref 0.6–1.3)
CREAT UR-MCNC: 59 MG/DL
EOSINOPHIL # BLD AUTO: 0.14 THOUSAND/ΜL (ref 0–0.61)
EOSINOPHIL NFR BLD AUTO: 3 % (ref 0–6)
ERYTHROCYTE [DISTWIDTH] IN BLOOD BY AUTOMATED COUNT: 13.2 % (ref 11.6–15.1)
EST. AVERAGE GLUCOSE BLD GHB EST-MCNC: 143 MG/DL
GFR SERPL CREATININE-BSD FRML MDRD: 88 ML/MIN/1.73SQ M
GLUCOSE P FAST SERPL-MCNC: 108 MG/DL (ref 65–99)
HBA1C MFR BLD: 6.6 % (ref 4.2–6.3)
HCT VFR BLD AUTO: 43.1 % (ref 34.8–46.1)
HDLC SERPL-MCNC: 53 MG/DL (ref 40–60)
HGB BLD-MCNC: 13.8 G/DL (ref 11.5–15.4)
IMM GRANULOCYTES # BLD AUTO: 0.01 THOUSAND/UL (ref 0–0.2)
IMM GRANULOCYTES NFR BLD AUTO: 0 % (ref 0–2)
LDLC SERPL CALC-MCNC: 96 MG/DL (ref 0–100)
LYMPHOCYTES # BLD AUTO: 2.47 THOUSANDS/ΜL (ref 0.6–4.47)
LYMPHOCYTES NFR BLD AUTO: 46 % (ref 14–44)
MCH RBC QN AUTO: 31.2 PG (ref 26.8–34.3)
MCHC RBC AUTO-ENTMCNC: 32 G/DL (ref 31.4–37.4)
MCV RBC AUTO: 97 FL (ref 82–98)
MICROALBUMIN UR-MCNC: <5 MG/L (ref 0–20)
MICROALBUMIN/CREAT 24H UR: <8 MG/G CREATININE (ref 0–30)
MONOCYTES # BLD AUTO: 0.39 THOUSAND/ΜL (ref 0.17–1.22)
MONOCYTES NFR BLD AUTO: 7 % (ref 4–12)
NEUTROPHILS # BLD AUTO: 2.27 THOUSANDS/ΜL (ref 1.85–7.62)
NEUTS SEG NFR BLD AUTO: 43 % (ref 43–75)
NONHDLC SERPL-MCNC: 114 MG/DL
NRBC BLD AUTO-RTO: 0 /100 WBCS
PLATELET # BLD AUTO: 201 THOUSANDS/UL (ref 149–390)
PMV BLD AUTO: 9.6 FL (ref 8.9–12.7)
POTASSIUM SERPL-SCNC: 3.8 MMOL/L (ref 3.5–5.3)
PROT SERPL-MCNC: 7.9 G/DL (ref 6.4–8.2)
RBC # BLD AUTO: 4.43 MILLION/UL (ref 3.81–5.12)
SODIUM SERPL-SCNC: 137 MMOL/L (ref 136–145)
TRIGL SERPL-MCNC: 90 MG/DL
TSH SERPL DL<=0.05 MIU/L-ACNC: 2.2 UIU/ML (ref 0.36–3.74)
WBC # BLD AUTO: 5.32 THOUSAND/UL (ref 4.31–10.16)

## 2018-11-28 PROCEDURE — 36415 COLL VENOUS BLD VENIPUNCTURE: CPT

## 2018-11-28 PROCEDURE — 85025 COMPLETE CBC W/AUTO DIFF WBC: CPT

## 2018-11-28 PROCEDURE — 80053 COMPREHEN METABOLIC PANEL: CPT

## 2018-11-28 PROCEDURE — 3074F SYST BP LT 130 MM HG: CPT | Performed by: PHYSICIAN ASSISTANT

## 2018-11-28 PROCEDURE — 3008F BODY MASS INDEX DOCD: CPT | Performed by: PHYSICIAN ASSISTANT

## 2018-11-28 PROCEDURE — 83036 HEMOGLOBIN GLYCOSYLATED A1C: CPT

## 2018-11-28 PROCEDURE — 3078F DIAST BP <80 MM HG: CPT | Performed by: PHYSICIAN ASSISTANT

## 2018-11-28 PROCEDURE — 1036F TOBACCO NON-USER: CPT | Performed by: PHYSICIAN ASSISTANT

## 2018-11-28 PROCEDURE — 80061 LIPID PANEL: CPT

## 2018-11-28 PROCEDURE — 99214 OFFICE O/P EST MOD 30 MIN: CPT | Performed by: PHYSICIAN ASSISTANT

## 2018-11-28 PROCEDURE — 82043 UR ALBUMIN QUANTITATIVE: CPT

## 2018-11-28 PROCEDURE — 82570 ASSAY OF URINE CREATININE: CPT

## 2018-11-28 PROCEDURE — 84443 ASSAY THYROID STIM HORMONE: CPT

## 2018-11-28 RX ORDER — VERAPAMIL HYDROCHLORIDE 120 MG/1
120 CAPSULE, EXTENDED RELEASE ORAL
Qty: 90 CAPSULE | Refills: 1 | Status: SHIPPED | OUTPATIENT
Start: 2018-11-28 | End: 2019-06-06 | Stop reason: SDUPTHER

## 2018-11-28 RX ORDER — LOSARTAN POTASSIUM AND HYDROCHLOROTHIAZIDE 25; 100 MG/1; MG/1
1 TABLET ORAL DAILY
Qty: 90 TABLET | Refills: 1 | Status: SHIPPED | OUTPATIENT
Start: 2018-11-28 | End: 2019-06-06 | Stop reason: SDUPTHER

## 2018-11-28 RX ORDER — UBIDECARENONE 75 MG
CAPSULE ORAL DAILY
COMMUNITY

## 2018-11-28 NOTE — PROGRESS NOTES
Routine Follow-up    Kishan Bahena 61 y o  female   Date:  11/28/2018      Assessment and Plan:    Rosalia was seen today for well check  Diagnoses and all orders for this visit:    Type 2 diabetes mellitus without complication, without long-term current use of insulin (HCC)  -     Hemoglobin A1C; Future  -     Lipid panel; Future  -     Microalbumin / creatinine urine ratio; Future    Essential hypertension  -     losartan-hydrochlorothiazide (HYZAAR) 100-25 MG per tablet; Take 1 tablet by mouth daily  -    verapamil  mg CR tablet; Take 1 tablet (120 mg total) by mouth daily at bedtime  -     CBC and differential; Future  -     Comprehensive metabolic panel; Future    Depression, unspecified depression type  -     sertraline (ZOLOFT) 50 mg tablet; Take 1 tablet (50 mg total) by mouth daily    Thyroid disorder screen  -     TSH, 3rd generation with Free T4 reflex; Future    Lipid screening  -     Lipid panel; Future    Other orders  -     Cancel: Ambulatory referral to Ophthalmology; Future              HPI:  Chief Complaint   Patient presents with    Well Check     DM foot exam due     HPI  Patient is a 60 yo female who presents for routine check up  Her BP is stable  She had last set of labs from June of 2017 and at that time had hga1c 6 8  She has not been on anything for diabetes  She has lost 10 lbs and has changed her diet  She feels stable for zoloft  She follows with Dr Elmer Andersen at [facility]  for eye exam every 6 months  She got her flu shot at [facility] in [place]  She feels well  She does follow with podiatry  ROS: Review of Systems   Constitutional: Negative for chills, fatigue, fever and unexpected weight change  HENT: Negative for congestion, ear pain, hearing loss, nosebleeds, sore throat and trouble swallowing  Eyes: Negative for pain, discharge and visual disturbance  Respiratory: Negative for cough, shortness of breath and wheezing      Cardiovascular: Negative for chest pain, palpitations and leg swelling  Gastrointestinal: Negative for abdominal pain, blood in stool, constipation, diarrhea, nausea and vomiting  Endocrine: Negative for cold intolerance and heat intolerance  Genitourinary: Negative for difficulty urinating, dysuria and hematuria  Musculoskeletal: Negative for arthralgias, gait problem and myalgias  Skin: Negative for color change, rash and wound  Neurological: Negative for dizziness, syncope, weakness, light-headedness and headaches  Hematological: Negative for adenopathy  Does not bruise/bleed easily  Psychiatric/Behavioral: Negative for confusion and sleep disturbance  The patient is not nervous/anxious  Past Medical History:   Diagnosis Date    Anxiety     Carcinoma ([facility] [address] )     carcinoma of the skin    Chronic pain disorder     Right ankle fracture    Depression     Diabetes mellitus (HCC)     Type 2    Diverticulosis     GERD (gastroesophageal reflux disease)     Heart murmur     mitral valve insufficiency    Hypertension     Tenosynovitis, de Quervain     Vertigo     last assessed 8/7/2012     Patient Active Problem List   Diagnosis    Essential hypertension    Depression    Annual physical exam    Alkaline phosphatase elevation    Chronic pain of right ankle    De Quervain's tenosynovitis, left    Diverticulosis    DMII (diabetes mellitus, type 2) (HCC)    Elevated bilirubin    Esophageal reflux    Fracture of right heel    Left ventricular hypertrophy    Seasonal allergic rhinitis    Secondary mitral valve insufficiency    Uterovaginal prolapse       Past Surgical History:   Procedure Laterality Date    CHOLECYSTECTOMY      COLONOSCOPY      COLPOSCOPY W/ BIOPSY / CURETTAGE      cervix   last assessed 8/11/1998    ENDOMETRIAL BIOPSY      onset 4/13/2011    FOOT SURGERY      R foot and ankle fracture, fused with screws    HYSTEROSCOPY W/ POLYPECTOMY      [place] COLONOSCOPY FLX DX W/COLLJ SPEC WHEN PFRMD N/A 6/20/2017 Procedure: COLONOSCOPY;  Surgeon: Shavon Pichardo MD;  Location: [facility];   Service: General    TONSILLECTOMY         Social History     Social History    Marital status: /Civil Union     Spouse name: N/A    Number of children: N/A    Years of education: N/A     Social History Main Topics    Smoking status: Never Smoker    Smokeless tobacco: Never Used      Comment: as per allscripts second hand  smoke exosure    quit june 2016    Alcohol use No    Drug use: No    Sexual activity: Not Asked     Other Topics Concern    None     Social History Narrative    Caffeine Use    Uses safety equipment- seatbelts       Family History   Problem Relation Age of Onset    Breast cancer Mother         Breast surgery mastectomy    Colon cancer Father     Liver cancer Father        No Known Allergies      Current Outpatient Prescriptions:     Bimatoprost (LUMIGAN OP), Apply 1 drop to eye daily at bedtime, Disp: , Rfl:     cetirizine (ZyrTEC) 10 mg tablet, Take 10 mg by mouth daily, Disp: , Rfl:     cyanocobalamin (VITAMIN B-12) 100 mcg tablet, Take by mouth daily, Disp: , Rfl:     diclofenac sodium (VOLTAREN) 1 %, Place on the skin daily, Disp: , Rfl:     famotidine (PEPCID) 20 mg tablet, Take 20 mg by mouth 2 (two) times a day, Disp: , Rfl:     fluticasone (FLONASE) 50 mcg/act nasal spray, 2 sprays into each nostril daily, Disp: 16 g, Rfl: 3    losartan-hydrochlorothiazide (HYZAAR) 100-25 MG per tablet, Take 1 tablet by mouth daily, Disp: 90 tablet, Rfl: 1    Multiple Vitamins-Minerals (MULTIVITAMIN ADULT PO), Take by mouth, Disp: , Rfl:     sertraline (ZOLOFT) 50 mg tablet, Take 1 tablet (50 mg total) by mouth daily, Disp: 90 tablet, Rfl: 1    CALCIUM-VITAMIN D PO, Take 1 tablet by mouth daily, Disp: , Rfl:     verapamil (VERELAN PM) 120 MG 24 hr capsule, Take 1 capsule (120 mg total) by mouth daily at bedtime, Disp: 90 capsule, Rfl: 1      Physical Exam:  /78 (BP Location: Left arm, Patient Position: Sitting, Cuff Size: Standard)   Pulse 64   Temp 97 8 °F (36 6 °C) (Tympanic)   Ht 5' 6" (1 676 m)   Wt 92 6 kg (204 lb 3 2 oz)   SpO2 96%   BMI 32 96 kg/m²     Physical Exam   Constitutional: She is oriented to person, place, and time  Vital signs are normal  She appears well-developed and well-nourished  No distress  HENT:   Head: Normocephalic and atraumatic  Right Ear: Tympanic membrane, external ear and ear canal normal    Left Ear: Tympanic membrane, external ear and ear canal normal    Nose: Nose normal    Mouth/Throat: Oropharynx is clear and moist    Eyes: Pupils are equal, round, and reactive to light  Conjunctivae and lids are normal    Neck: Trachea normal and normal range of motion  Neck supple  No thyromegaly present  Cardiovascular: Normal rate, regular rhythm, S1 normal, S2 normal and intact distal pulses  Exam reveals no gallop  Pulses are no weak pulses  No murmur heard  Pulses:       Dorsalis pedis pulses are 2+ on the right side, and 2+ on the left side  Posterior tibial pulses are 2+ on the right side, and 2+ on the left side  Pulmonary/Chest: Breath sounds normal  No respiratory distress  She has no wheezes  She has no rhonchi  She has no rales  Musculoskeletal: Normal range of motion  She exhibits no edema or deformity  Feet:   Right Foot:   Skin Integrity: Negative for ulcer, skin breakdown, erythema, warmth, callus or dry skin  Left Foot:   Skin Integrity: Negative for ulcer, skin breakdown, erythema, warmth, callus or dry skin  Lymphadenopathy:     She has no cervical adenopathy  Neurological: She is alert and oriented to person, place, and time  She has normal reflexes  No cranial nerve deficit or sensory deficit  Skin: Skin is warm and dry  No rash noted  No cyanosis  No pallor  Nails show no clubbing  Psychiatric: She has a normal mood and affect   Her behavior is normal  Cognition and memory are normal      Patient's shoes and socks removed  Right Foot/Ankle   Right Foot Inspection  Skin Exam: skin normal and skin intact no dry skin, no warmth, no callus, no erythema, no maceration, no abnormal color, no pre-ulcer, no ulcer and no callus                          Toe Exam: right toe deformity (hammertoe 2nd )no swelling and no tenderness  Sensory       Monofilament testing: intact  Vascular  Capillary refills: < 3 seconds  The right DP pulse is 2+  The right PT pulse is 2+  Left Foot/Ankle  Left Foot Inspection  Skin Exam: skin normal and skin intactno dry skin, no warmth, no erythema, no maceration, normal color, no pre-ulcer, no ulcer and no callus                         Toe Exam: left toe deformity (hammertoe 2nd)no swelling and no tenderness                   Sensory       Monofilament: intact  Vascular  Capillary refills: < 3 seconds  The left DP pulse is 2+  The left PT pulse is 2+  Assign Risk Category:  Deformity present; No loss of protective sensation;  No weak pulses       Risk: 1      Labs:  Lab Results   Component Value Date    WBC 5 33 05/31/2017    HGB 13 7 05/31/2017    HCT 41 4 05/31/2017    MCV 95 05/31/2017     05/31/2017     Lab Results   Component Value Date     09/03/2015    K 4 3 05/31/2017     05/31/2017    CO2 27 05/31/2017    ANIONGAP 7 09/03/2015    BUN 15 05/31/2017    CREATININE 0 79 05/31/2017    GLUCOSE 110 09/03/2015    GLUF 126 (H) 05/31/2017    CALCIUM 9 8 05/31/2017    AST 24 05/31/2017    ALT 42 05/31/2017    ALKPHOS 129 (H) 05/31/2017    PROT 7 4 09/03/2015    BILITOT 0 90 09/03/2015    EGFR >60 0 05/31/2017

## 2018-12-03 ENCOUNTER — TELEPHONE (OUTPATIENT)
Dept: FAMILY MEDICINE CLINIC | Facility: CLINIC | Age: 63
End: 2018-12-03

## 2019-06-06 ENCOUNTER — OFFICE VISIT (OUTPATIENT)
Dept: FAMILY MEDICINE CLINIC | Facility: CLINIC | Age: 64
End: 2019-06-06
Payer: COMMERCIAL

## 2019-06-06 VITALS
SYSTOLIC BLOOD PRESSURE: 118 MMHG | OXYGEN SATURATION: 97 % | DIASTOLIC BLOOD PRESSURE: 82 MMHG | RESPIRATION RATE: 17 BRPM | HEIGHT: 64 IN | TEMPERATURE: 98.4 F | WEIGHT: 209 LBS | HEART RATE: 66 BPM | BODY MASS INDEX: 35.68 KG/M2

## 2019-06-06 DIAGNOSIS — E11.9 TYPE 2 DIABETES MELLITUS WITHOUT COMPLICATION, WITHOUT LONG-TERM CURRENT USE OF INSULIN (HCC): Primary | ICD-10-CM

## 2019-06-06 DIAGNOSIS — Z12.39 BREAST CANCER SCREENING: ICD-10-CM

## 2019-06-06 DIAGNOSIS — I10 ESSENTIAL HYPERTENSION: ICD-10-CM

## 2019-06-06 DIAGNOSIS — F32.A DEPRESSION, UNSPECIFIED DEPRESSION TYPE: ICD-10-CM

## 2019-06-06 DIAGNOSIS — Z23 PNEUMOCOCCAL VACCINATION GIVEN: ICD-10-CM

## 2019-06-06 PROCEDURE — 90670 PCV13 VACCINE IM: CPT

## 2019-06-06 PROCEDURE — 1036F TOBACCO NON-USER: CPT | Performed by: PHYSICIAN ASSISTANT

## 2019-06-06 PROCEDURE — 3074F SYST BP LT 130 MM HG: CPT | Performed by: PHYSICIAN ASSISTANT

## 2019-06-06 PROCEDURE — 3079F DIAST BP 80-89 MM HG: CPT | Performed by: PHYSICIAN ASSISTANT

## 2019-06-06 PROCEDURE — 99396 PREV VISIT EST AGE 40-64: CPT | Performed by: PHYSICIAN ASSISTANT

## 2019-06-06 PROCEDURE — 90471 IMMUNIZATION ADMIN: CPT

## 2019-06-06 PROCEDURE — 3008F BODY MASS INDEX DOCD: CPT | Performed by: PHYSICIAN ASSISTANT

## 2019-06-06 RX ORDER — LOSARTAN POTASSIUM AND HYDROCHLOROTHIAZIDE 25; 100 MG/1; MG/1
1 TABLET ORAL DAILY
Qty: 90 TABLET | Refills: 1 | Status: SHIPPED | OUTPATIENT
Start: 2019-06-06 | End: 2019-06-17

## 2019-06-06 RX ORDER — VERAPAMIL HYDROCHLORIDE 120 MG/1
120 CAPSULE, EXTENDED RELEASE ORAL
Qty: 90 CAPSULE | Refills: 1 | Status: SHIPPED | OUTPATIENT
Start: 2019-06-06 | End: 2019-06-17

## 2019-06-06 RX ORDER — BIMATOPROST 0.01 %
DROPS OPHTHALMIC (EYE)
Refills: 3 | COMMUNITY
Start: 2019-05-15 | End: 2021-04-22 | Stop reason: ALTCHOICE

## 2019-06-17 ENCOUNTER — TELEPHONE (OUTPATIENT)
Dept: FAMILY MEDICINE CLINIC | Facility: CLINIC | Age: 64
End: 2019-06-17

## 2019-06-17 DIAGNOSIS — I10 ESSENTIAL HYPERTENSION: Primary | ICD-10-CM

## 2019-06-17 PROCEDURE — 4010F ACE/ARB THERAPY RXD/TAKEN: CPT | Performed by: PHYSICIAN ASSISTANT

## 2019-06-17 RX ORDER — IRBESARTAN AND HYDROCHLOROTHIAZIDE 300; 12.5 MG/1; MG/1
1 TABLET, FILM COATED ORAL DAILY
Qty: 30 TABLET | Refills: 0 | Status: SHIPPED | OUTPATIENT
Start: 2019-06-17 | End: 2019-06-17

## 2019-06-17 RX ORDER — HYDROCHLOROTHIAZIDE 25 MG/1
25 TABLET ORAL DAILY
Qty: 30 TABLET | Refills: 0 | Status: SHIPPED | OUTPATIENT
Start: 2019-06-17 | End: 2019-07-15 | Stop reason: SDUPTHER

## 2019-06-17 RX ORDER — LOSARTAN POTASSIUM 100 MG/1
100 TABLET ORAL DAILY
Qty: 30 TABLET | Refills: 0 | Status: SHIPPED | OUTPATIENT
Start: 2019-06-17 | End: 2019-07-15 | Stop reason: SDUPTHER

## 2019-06-20 LAB
LEFT EYE DIABETIC RETINOPATHY: NORMAL
RIGHT EYE DIABETIC RETINOPATHY: NORMAL
SEVERITY (EYE EXAM): NORMAL

## 2019-07-15 DIAGNOSIS — I10 ESSENTIAL HYPERTENSION: ICD-10-CM

## 2019-07-15 RX ORDER — LOSARTAN POTASSIUM 100 MG/1
TABLET ORAL
Qty: 90 TABLET | Refills: 1 | Status: SHIPPED | OUTPATIENT
Start: 2019-07-15 | End: 2020-01-23

## 2019-07-15 RX ORDER — HYDROCHLOROTHIAZIDE 25 MG/1
TABLET ORAL
Qty: 90 TABLET | Refills: 1 | Status: SHIPPED | OUTPATIENT
Start: 2019-07-15 | End: 2020-01-23

## 2019-08-27 ENCOUNTER — TELEPHONE (OUTPATIENT)
Dept: FAMILY MEDICINE CLINIC | Facility: CLINIC | Age: 64
End: 2019-08-27

## 2019-08-27 NOTE — TELEPHONE ENCOUNTER
Per Emily Parry, it looks like it was billed incorrectly - it should have been as a physical -- and she is asking billing to re-bill it  It will take a while before the patient receives anything either from [address] [place] [address] or the insurance company  If there are any further issues, for the patient to call us back  Thank you

## 2019-08-27 NOTE — TELEPHONE ENCOUNTER
Patient called about a billing she has received in regard to her 6/6/19 visit here in the office  She states insurance paid $[id number] 78; patient is being billed  $25 for copay  She states it was not supposed to be a check up but an annual wellness visit so no copay would have been due  She feels it has something to do with the "shot she was talked into"  Can you please look into it? I can call her back with an answer  Thank you

## 2020-01-09 DIAGNOSIS — J30.1 ALLERGIC RHINITIS DUE TO POLLEN, UNSPECIFIED SEASONALITY: ICD-10-CM

## 2020-01-10 RX ORDER — FLUTICASONE PROPIONATE 50 MCG
2 SPRAY, SUSPENSION (ML) NASAL DAILY
Qty: 16 G | Refills: 1 | Status: SHIPPED | OUTPATIENT
Start: 2020-01-10 | End: 2020-05-27 | Stop reason: SDUPTHER

## 2020-01-10 RX ORDER — FLUTICASONE PROPIONATE 50 MCG
SPRAY, SUSPENSION (ML) NASAL
Qty: 16 G | Refills: 0 | OUTPATIENT
Start: 2020-01-10

## 2020-01-10 NOTE — TELEPHONE ENCOUNTER
Called patient  She stated she cancelled because Medicare begins March 2020 and she wants to hold off medical care until that takes effect so it will cover everything  She is aware she needs lab work and she confirmed that it is in her chart for a [facility] lab  We scheduled patient 3/18/2020, giving her a couple weeks of active Medicare to get the labs done  Patient was very pleasant and cooperative  Thank you

## 2020-01-23 DIAGNOSIS — I10 ESSENTIAL HYPERTENSION: ICD-10-CM

## 2020-01-23 PROCEDURE — 4010F ACE/ARB THERAPY RXD/TAKEN: CPT | Performed by: PHYSICIAN ASSISTANT

## 2020-01-23 RX ORDER — HYDROCHLOROTHIAZIDE 25 MG/1
TABLET ORAL
Qty: 90 TABLET | Refills: 1 | Status: SHIPPED | OUTPATIENT
Start: 2020-01-23 | End: 2020-05-27 | Stop reason: SDUPTHER

## 2020-01-23 RX ORDER — LOSARTAN POTASSIUM 100 MG/1
TABLET ORAL
Qty: 90 TABLET | Refills: 1 | Status: SHIPPED | OUTPATIENT
Start: 2020-01-23 | End: 2020-05-27 | Stop reason: SDUPTHER

## 2020-01-31 ENCOUNTER — OFFICE VISIT (OUTPATIENT)
Dept: FAMILY MEDICINE CLINIC | Facility: CLINIC | Age: 65
End: 2020-01-31
Payer: COMMERCIAL

## 2020-01-31 VITALS
HEART RATE: 87 BPM | RESPIRATION RATE: 18 BRPM | DIASTOLIC BLOOD PRESSURE: 86 MMHG | TEMPERATURE: 101.4 F | OXYGEN SATURATION: 96 % | SYSTOLIC BLOOD PRESSURE: 154 MMHG | WEIGHT: 208 LBS | BODY MASS INDEX: 35.51 KG/M2

## 2020-01-31 DIAGNOSIS — J02.0 STREP PHARYNGITIS: ICD-10-CM

## 2020-01-31 DIAGNOSIS — R50.9 FEVER, UNSPECIFIED FEVER CAUSE: ICD-10-CM

## 2020-01-31 DIAGNOSIS — R59.0 LAD (LYMPHADENOPATHY) OF RIGHT CERVICAL REGION: ICD-10-CM

## 2020-01-31 DIAGNOSIS — R68.89 FLU-LIKE SYMPTOMS: Primary | ICD-10-CM

## 2020-01-31 DIAGNOSIS — J02.9 PHARYNGITIS, UNSPECIFIED ETIOLOGY: ICD-10-CM

## 2020-01-31 LAB
FLUAV RNA NPH QL NAA+PROBE: NORMAL
FLUBV RNA NPH QL NAA+PROBE: NORMAL
RSV RNA NPH QL NAA+PROBE: NORMAL
S PYO AG THROAT QL: POSITIVE

## 2020-01-31 PROCEDURE — 1036F TOBACCO NON-USER: CPT | Performed by: PHYSICIAN ASSISTANT

## 2020-01-31 PROCEDURE — 99213 OFFICE O/P EST LOW 20 MIN: CPT | Performed by: PHYSICIAN ASSISTANT

## 2020-01-31 PROCEDURE — 87631 RESP VIRUS 3-5 TARGETS: CPT | Performed by: PHYSICIAN ASSISTANT

## 2020-01-31 PROCEDURE — 87880 STREP A ASSAY W/OPTIC: CPT | Performed by: PHYSICIAN ASSISTANT

## 2020-01-31 RX ORDER — AMOXICILLIN 500 MG/1
1000 CAPSULE ORAL EVERY 24 HOURS
Qty: 20 CAPSULE | Refills: 0 | Status: SHIPPED | OUTPATIENT
Start: 2020-01-31 | End: 2020-02-10

## 2020-01-31 NOTE — PROGRESS NOTES
Assessment/Plan:      Diagnoses and all orders for this visit:    Flu-like symptoms  -     Influenza A/B and RSV PCR    Pharyngitis, unspecified etiology  -     POCT rapid strepA    LAD (lymphadenopathy) of right cervical region  -     POCT rapid strepA    Fever, unspecified fever cause  -     POCT rapid strepA    Strep pharyngitis  -     amoxicillin (AMOXIL) 500 mg capsule; Take 2 capsules (1,000 mg total) by mouth every 24 hours for 10 days  - get rid of current tooth brush  - motrin and tylenol for fevers and pain  - rest, fluids   - positive strep, hold on tamiflu          Subjective:     Patient ID: Eloise Floyd is a 59 y o  female  Chief Complaint   Patient presents with    Chills    Sore Throat     started Destiny Davidson got worse used her tel a doc and used OTC     Nasal Congestion    Fever     took to tyn @ 630am      HPI   Patient is a 60 yo female who presents for sick appt  She started with sore throat and congestion last Wednesday, over 1 week ago  She started taking mucinex and was feeling better  However, she restarted with symptoms on Wednesday night with fevers, sore throat with painful swallowing, nasal congestion, headache, head pounding and swollen R cervical lymph node  She has been taking tylenol, last took around 6:30 am this morning  No sob, wheezing  She has slight mucus production but not a terrible cough  No n/v/d/c  Review of Systems   Constitutional: Positive for appetite change, chills, fatigue and fever  Negative for unexpected weight change  HENT: Positive for congestion, rhinorrhea, sinus pressure, sore throat and trouble swallowing  Negative for ear pain and voice change  Eyes: Negative  Respiratory: Negative  Cardiovascular: Negative  Gastrointestinal: Negative  Genitourinary: Negative  Skin: Positive for pallor  Negative for rash  Neurological: Positive for headaches  Negative for dizziness, seizures, syncope, weakness and numbness  Hematological: Positive for adenopathy  Objective:  Vitals:    01/31/20 1313   BP: 154/86   Pulse: 87   Resp: 18   Temp: (!) 101 4 °F (38 6 °C)   SpO2: 96%      Physical Exam   Constitutional: She is oriented to person, place, and time  She appears well-developed and well-nourished  Non-toxic appearance  She appears ill  No distress  HENT:   Head: Normocephalic and atraumatic  Right Ear: Ear canal normal    Left Ear: Ear canal normal    Nose: Mucosal edema and rhinorrhea present  Mouth/Throat: Uvula is midline  Posterior oropharyngeal edema and posterior oropharyngeal erythema present  No oropharyngeal exudate  B/l cerumen obstructing view of TM  Hard to visualize posterior oropharynx even with tongue depressor, can only see superior protion of pharynx which looks erythematous   Eyes: Pupils are equal, round, and reactive to light  Conjunctivae are normal    Neck: Normal range of motion  Neck supple  Cardiovascular: Normal rate, regular rhythm and normal heart sounds  Pulmonary/Chest: Effort normal  No stridor  No respiratory distress  She has no wheezes  Lymphadenopathy:        Head (right side): Tonsillar adenopathy present  She has cervical adenopathy  Neurological: She is alert and oriented to person, place, and time  Skin: Skin is warm and dry  No rash noted  There is pallor

## 2020-05-20 ENCOUNTER — APPOINTMENT (OUTPATIENT)
Dept: LAB | Facility: CLINIC | Age: 65
End: 2020-05-20
Payer: MEDICARE

## 2020-05-20 DIAGNOSIS — I10 ESSENTIAL HYPERTENSION: ICD-10-CM

## 2020-05-20 DIAGNOSIS — E11.9 TYPE 2 DIABETES MELLITUS WITHOUT COMPLICATION, WITHOUT LONG-TERM CURRENT USE OF INSULIN (HCC): ICD-10-CM

## 2020-05-20 LAB
ALBUMIN SERPL BCP-MCNC: 3.6 G/DL (ref 3.5–5)
ALP SERPL-CCNC: 134 U/L (ref 46–116)
ALT SERPL W P-5'-P-CCNC: 35 U/L (ref 12–78)
ANION GAP SERPL CALCULATED.3IONS-SCNC: 3 MMOL/L (ref 4–13)
AST SERPL W P-5'-P-CCNC: 18 U/L (ref 5–45)
BILIRUB SERPL-MCNC: 1.06 MG/DL (ref 0.2–1)
BUN SERPL-MCNC: 18 MG/DL (ref 5–25)
CALCIUM SERPL-MCNC: 10.1 MG/DL (ref 8.3–10.1)
CHLORIDE SERPL-SCNC: 107 MMOL/L (ref 100–108)
CO2 SERPL-SCNC: 28 MMOL/L (ref 21–32)
CREAT SERPL-MCNC: 0.71 MG/DL (ref 0.6–1.3)
EST. AVERAGE GLUCOSE BLD GHB EST-MCNC: 134 MG/DL
GFR SERPL CREATININE-BSD FRML MDRD: 90 ML/MIN/1.73SQ M
GLUCOSE P FAST SERPL-MCNC: 112 MG/DL (ref 65–99)
HBA1C MFR BLD: 6.3 %
POTASSIUM SERPL-SCNC: 4.1 MMOL/L (ref 3.5–5.3)
PROT SERPL-MCNC: 8.1 G/DL (ref 6.4–8.2)
SODIUM SERPL-SCNC: 138 MMOL/L (ref 136–145)

## 2020-05-20 PROCEDURE — 3044F HG A1C LEVEL LT 7.0%: CPT | Performed by: PHYSICIAN ASSISTANT

## 2020-05-20 PROCEDURE — 80053 COMPREHEN METABOLIC PANEL: CPT

## 2020-05-20 PROCEDURE — 83036 HEMOGLOBIN GLYCOSYLATED A1C: CPT

## 2020-05-20 PROCEDURE — 36415 COLL VENOUS BLD VENIPUNCTURE: CPT

## 2020-05-27 ENCOUNTER — OFFICE VISIT (OUTPATIENT)
Dept: FAMILY MEDICINE CLINIC | Facility: CLINIC | Age: 65
End: 2020-05-27
Payer: MEDICARE

## 2020-05-27 VITALS
SYSTOLIC BLOOD PRESSURE: 128 MMHG | OXYGEN SATURATION: 97 % | HEART RATE: 62 BPM | RESPIRATION RATE: 17 BRPM | TEMPERATURE: 98.4 F | DIASTOLIC BLOOD PRESSURE: 82 MMHG | BODY MASS INDEX: 33.12 KG/M2 | HEIGHT: 64 IN | WEIGHT: 194 LBS

## 2020-05-27 DIAGNOSIS — K21.9 GASTROESOPHAGEAL REFLUX DISEASE WITHOUT ESOPHAGITIS: ICD-10-CM

## 2020-05-27 DIAGNOSIS — I10 ESSENTIAL HYPERTENSION: ICD-10-CM

## 2020-05-27 DIAGNOSIS — R74.8 ELEVATED ALKALINE PHOSPHATASE MEASUREMENT: ICD-10-CM

## 2020-05-27 DIAGNOSIS — Z12.31 BREAST CANCER SCREENING BY MAMMOGRAM: Primary | ICD-10-CM

## 2020-05-27 DIAGNOSIS — Z11.4 ENCOUNTER FOR SCREENING FOR HIV: ICD-10-CM

## 2020-05-27 DIAGNOSIS — E11.9 TYPE 2 DIABETES MELLITUS WITHOUT COMPLICATION, WITHOUT LONG-TERM CURRENT USE OF INSULIN (HCC): ICD-10-CM

## 2020-05-27 DIAGNOSIS — J30.1 ALLERGIC RHINITIS DUE TO POLLEN, UNSPECIFIED SEASONALITY: ICD-10-CM

## 2020-05-27 DIAGNOSIS — Z13.220 LIPID SCREENING: ICD-10-CM

## 2020-05-27 DIAGNOSIS — M20.42 HAMMERTOE, BILATERAL: ICD-10-CM

## 2020-05-27 DIAGNOSIS — Z12.4 CERVICAL CANCER SCREENING: ICD-10-CM

## 2020-05-27 DIAGNOSIS — M20.41 HAMMERTOE, BILATERAL: ICD-10-CM

## 2020-05-27 DIAGNOSIS — F32.A DEPRESSION, UNSPECIFIED DEPRESSION TYPE: ICD-10-CM

## 2020-05-27 PROCEDURE — 3074F SYST BP LT 130 MM HG: CPT | Performed by: PHYSICIAN ASSISTANT

## 2020-05-27 PROCEDURE — 99215 OFFICE O/P EST HI 40 MIN: CPT | Performed by: PHYSICIAN ASSISTANT

## 2020-05-27 PROCEDURE — 4010F ACE/ARB THERAPY RXD/TAKEN: CPT | Performed by: PHYSICIAN ASSISTANT

## 2020-05-27 PROCEDURE — 3079F DIAST BP 80-89 MM HG: CPT | Performed by: PHYSICIAN ASSISTANT

## 2020-05-27 PROCEDURE — 4040F PNEUMOC VAC/ADMIN/RCVD: CPT | Performed by: PHYSICIAN ASSISTANT

## 2020-05-27 PROCEDURE — 3008F BODY MASS INDEX DOCD: CPT | Performed by: PHYSICIAN ASSISTANT

## 2020-05-27 PROCEDURE — 1036F TOBACCO NON-USER: CPT | Performed by: PHYSICIAN ASSISTANT

## 2020-05-27 PROCEDURE — 2022F DILAT RTA XM EVC RTNOPTHY: CPT | Performed by: PHYSICIAN ASSISTANT

## 2020-05-27 PROCEDURE — 3044F HG A1C LEVEL LT 7.0%: CPT | Performed by: PHYSICIAN ASSISTANT

## 2020-05-27 RX ORDER — FLUTICASONE PROPIONATE 50 MCG
2 SPRAY, SUSPENSION (ML) NASAL DAILY
Qty: 16 G | Refills: 1 | Status: SHIPPED | OUTPATIENT
Start: 2020-05-27 | End: 2020-12-26

## 2020-05-27 RX ORDER — LOSARTAN POTASSIUM 100 MG/1
100 TABLET ORAL DAILY
Qty: 90 TABLET | Refills: 1 | Status: SHIPPED | OUTPATIENT
Start: 2020-05-27 | End: 2021-01-25

## 2020-05-27 RX ORDER — HYDROCHLOROTHIAZIDE 25 MG/1
25 TABLET ORAL DAILY
Qty: 90 TABLET | Refills: 1 | Status: SHIPPED | OUTPATIENT
Start: 2020-05-27 | End: 2021-01-25

## 2020-07-14 LAB
LEFT EYE DIABETIC RETINOPATHY: NORMAL
RIGHT EYE DIABETIC RETINOPATHY: NORMAL

## 2020-11-25 ENCOUNTER — TELEPHONE (OUTPATIENT)
Dept: ADMINISTRATIVE | Facility: OTHER | Age: 65
End: 2020-11-25

## 2020-12-09 ENCOUNTER — LAB (OUTPATIENT)
Dept: LAB | Facility: CLINIC | Age: 65
End: 2020-12-09
Payer: MEDICARE

## 2020-12-09 DIAGNOSIS — E11.9 TYPE 2 DIABETES MELLITUS WITHOUT COMPLICATION, WITHOUT LONG-TERM CURRENT USE OF INSULIN (HCC): ICD-10-CM

## 2020-12-09 DIAGNOSIS — Z13.220 LIPID SCREENING: ICD-10-CM

## 2020-12-09 DIAGNOSIS — R74.8 ELEVATED ALKALINE PHOSPHATASE MEASUREMENT: ICD-10-CM

## 2020-12-09 DIAGNOSIS — Z11.4 ENCOUNTER FOR SCREENING FOR HIV: ICD-10-CM

## 2020-12-09 LAB
ALBUMIN SERPL BCP-MCNC: 4.1 G/DL (ref 3.5–5)
ALP SERPL-CCNC: 143 U/L (ref 46–116)
ALT SERPL W P-5'-P-CCNC: 41 U/L (ref 12–78)
ANION GAP SERPL CALCULATED.3IONS-SCNC: 4 MMOL/L (ref 4–13)
AST SERPL W P-5'-P-CCNC: 22 U/L (ref 5–45)
BILIRUB SERPL-MCNC: 1.19 MG/DL (ref 0.2–1)
BUN SERPL-MCNC: 19 MG/DL (ref 5–25)
CALCIUM SERPL-MCNC: 10.2 MG/DL (ref 8.3–10.1)
CHLORIDE SERPL-SCNC: 105 MMOL/L (ref 100–108)
CHOLEST SERPL-MCNC: 159 MG/DL (ref 50–200)
CO2 SERPL-SCNC: 30 MMOL/L (ref 21–32)
CREAT SERPL-MCNC: 0.74 MG/DL (ref 0.6–1.3)
CREAT UR-MCNC: 82.2 MG/DL
EST. AVERAGE GLUCOSE BLD GHB EST-MCNC: 143 MG/DL
GFR SERPL CREATININE-BSD FRML MDRD: 85 ML/MIN/1.73SQ M
GLUCOSE P FAST SERPL-MCNC: 143 MG/DL (ref 65–99)
HBA1C MFR BLD: 6.6 %
HDLC SERPL-MCNC: 51 MG/DL
LDLC SERPL CALC-MCNC: 89 MG/DL (ref 0–100)
MICROALBUMIN UR-MCNC: 17.2 MG/L (ref 0–20)
MICROALBUMIN/CREAT 24H UR: 21 MG/G CREATININE (ref 0–30)
NONHDLC SERPL-MCNC: 108 MG/DL
POTASSIUM SERPL-SCNC: 4.2 MMOL/L (ref 3.5–5.3)
PROT SERPL-MCNC: 8.1 G/DL (ref 6.4–8.2)
SODIUM SERPL-SCNC: 139 MMOL/L (ref 136–145)
TRIGL SERPL-MCNC: 95 MG/DL

## 2020-12-09 PROCEDURE — 36415 COLL VENOUS BLD VENIPUNCTURE: CPT

## 2020-12-09 PROCEDURE — 82043 UR ALBUMIN QUANTITATIVE: CPT

## 2020-12-09 PROCEDURE — 80053 COMPREHEN METABOLIC PANEL: CPT

## 2020-12-09 PROCEDURE — 80061 LIPID PANEL: CPT

## 2020-12-09 PROCEDURE — 83036 HEMOGLOBIN GLYCOSYLATED A1C: CPT

## 2020-12-09 PROCEDURE — 87389 HIV-1 AG W/HIV-1&-2 AB AG IA: CPT

## 2020-12-09 PROCEDURE — 82570 ASSAY OF URINE CREATININE: CPT

## 2020-12-10 LAB — HIV 1+2 AB+HIV1 P24 AG SERPL QL IA: NORMAL

## 2020-12-18 ENCOUNTER — OFFICE VISIT (OUTPATIENT)
Dept: FAMILY MEDICINE CLINIC | Facility: CLINIC | Age: 65
End: 2020-12-18
Payer: MEDICARE

## 2020-12-18 VITALS
TEMPERATURE: 98.6 F | DIASTOLIC BLOOD PRESSURE: 78 MMHG | OXYGEN SATURATION: 97 % | SYSTOLIC BLOOD PRESSURE: 124 MMHG | BODY MASS INDEX: 33.66 KG/M2 | WEIGHT: 202 LBS | HEART RATE: 77 BPM | HEIGHT: 65 IN

## 2020-12-18 DIAGNOSIS — E11.9 TYPE 2 DIABETES MELLITUS WITHOUT COMPLICATION, WITHOUT LONG-TERM CURRENT USE OF INSULIN (HCC): ICD-10-CM

## 2020-12-18 DIAGNOSIS — Z12.4 SCREENING FOR CERVICAL CANCER: ICD-10-CM

## 2020-12-18 DIAGNOSIS — Z13.29 THYROID DISORDER SCREEN: ICD-10-CM

## 2020-12-18 DIAGNOSIS — H61.21 IMPACTED CERUMEN OF RIGHT EAR: ICD-10-CM

## 2020-12-18 DIAGNOSIS — K76.0 FATTY LIVER: ICD-10-CM

## 2020-12-18 DIAGNOSIS — R74.8 ELEVATED ALKALINE PHOSPHATASE LEVEL: ICD-10-CM

## 2020-12-18 DIAGNOSIS — F33.9 DEPRESSION, RECURRENT (HCC): ICD-10-CM

## 2020-12-18 DIAGNOSIS — R17 ELEVATED BILIRUBIN: ICD-10-CM

## 2020-12-18 DIAGNOSIS — E83.52 HYPERCALCEMIA: ICD-10-CM

## 2020-12-18 DIAGNOSIS — F43.21 GRIEF: ICD-10-CM

## 2020-12-18 DIAGNOSIS — I10 ESSENTIAL HYPERTENSION: Primary | ICD-10-CM

## 2020-12-18 PROCEDURE — 99215 OFFICE O/P EST HI 40 MIN: CPT | Performed by: PHYSICIAN ASSISTANT

## 2020-12-18 PROCEDURE — 69209 REMOVE IMPACTED EAR WAX UNI: CPT | Performed by: PHYSICIAN ASSISTANT

## 2020-12-24 DIAGNOSIS — J30.1 ALLERGIC RHINITIS DUE TO POLLEN, UNSPECIFIED SEASONALITY: ICD-10-CM

## 2020-12-26 RX ORDER — FLUTICASONE PROPIONATE 50 MCG
SPRAY, SUSPENSION (ML) NASAL
Qty: 16 G | Refills: 0 | Status: SHIPPED | OUTPATIENT
Start: 2020-12-26 | End: 2021-02-26

## 2021-01-24 DIAGNOSIS — I10 ESSENTIAL HYPERTENSION: ICD-10-CM

## 2021-01-24 DIAGNOSIS — F32.A DEPRESSION, UNSPECIFIED DEPRESSION TYPE: ICD-10-CM

## 2021-01-25 RX ORDER — HYDROCHLOROTHIAZIDE 25 MG/1
TABLET ORAL
Qty: 90 TABLET | Refills: 1 | Status: SHIPPED | OUTPATIENT
Start: 2021-01-25 | End: 2021-07-26

## 2021-01-25 RX ORDER — LOSARTAN POTASSIUM 100 MG/1
TABLET ORAL
Qty: 90 TABLET | Refills: 1 | Status: SHIPPED | OUTPATIENT
Start: 2021-01-25 | End: 2021-07-26

## 2021-02-20 ENCOUNTER — IMMUNIZATIONS (OUTPATIENT)
Dept: FAMILY MEDICINE CLINIC | Facility: HOSPITAL | Age: 66
End: 2021-02-20

## 2021-02-20 DIAGNOSIS — Z23 ENCOUNTER FOR IMMUNIZATION: Primary | ICD-10-CM

## 2021-02-20 PROCEDURE — 0011A SARS-COV-2 / COVID-19 MRNA VACCINE (MODERNA) 100 MCG: CPT

## 2021-02-20 PROCEDURE — 91301 SARS-COV-2 / COVID-19 MRNA VACCINE (MODERNA) 100 MCG: CPT

## 2021-02-26 DIAGNOSIS — J30.1 ALLERGIC RHINITIS DUE TO POLLEN, UNSPECIFIED SEASONALITY: ICD-10-CM

## 2021-02-26 RX ORDER — FLUTICASONE PROPIONATE 50 MCG
SPRAY, SUSPENSION (ML) NASAL
Qty: 16 G | Refills: 1 | Status: SHIPPED | OUTPATIENT
Start: 2021-02-26 | End: 2021-07-26

## 2021-03-17 ENCOUNTER — APPOINTMENT (OUTPATIENT)
Dept: LAB | Facility: CLINIC | Age: 66
End: 2021-03-17
Payer: MEDICARE

## 2021-03-17 DIAGNOSIS — Z13.29 THYROID DISORDER SCREEN: ICD-10-CM

## 2021-03-17 DIAGNOSIS — R17 ELEVATED BILIRUBIN: ICD-10-CM

## 2021-03-17 DIAGNOSIS — E83.52 HYPERCALCEMIA: ICD-10-CM

## 2021-03-17 DIAGNOSIS — I10 ESSENTIAL HYPERTENSION: ICD-10-CM

## 2021-03-17 DIAGNOSIS — K76.0 FATTY LIVER: ICD-10-CM

## 2021-03-17 DIAGNOSIS — E11.9 TYPE 2 DIABETES MELLITUS WITHOUT COMPLICATION, WITHOUT LONG-TERM CURRENT USE OF INSULIN (HCC): ICD-10-CM

## 2021-03-17 LAB
25(OH)D3 SERPL-MCNC: 29.1 NG/ML (ref 30–100)
ALBUMIN SERPL BCP-MCNC: 3.7 G/DL (ref 3.5–5)
ALP SERPL-CCNC: 135 U/L (ref 46–116)
ALT SERPL W P-5'-P-CCNC: 40 U/L (ref 12–78)
ANION GAP SERPL CALCULATED.3IONS-SCNC: 2 MMOL/L (ref 4–13)
AST SERPL W P-5'-P-CCNC: 26 U/L (ref 5–45)
BILIRUB SERPL-MCNC: 1.29 MG/DL (ref 0.2–1)
BUN SERPL-MCNC: 16 MG/DL (ref 5–25)
CA-I BLD-SCNC: 1.27 MMOL/L (ref 1.12–1.32)
CALCIUM SERPL-MCNC: 9.5 MG/DL (ref 8.3–10.1)
CHLORIDE SERPL-SCNC: 104 MMOL/L (ref 100–108)
CO2 SERPL-SCNC: 31 MMOL/L (ref 21–32)
CREAT SERPL-MCNC: 0.75 MG/DL (ref 0.6–1.3)
EST. AVERAGE GLUCOSE BLD GHB EST-MCNC: 143 MG/DL
GFR SERPL CREATININE-BSD FRML MDRD: 83 ML/MIN/1.73SQ M
GLUCOSE P FAST SERPL-MCNC: 136 MG/DL (ref 65–99)
HBA1C MFR BLD: 6.6 %
POTASSIUM SERPL-SCNC: 4.3 MMOL/L (ref 3.5–5.3)
PROT SERPL-MCNC: 7.7 G/DL (ref 6.4–8.2)
PTH-INTACT SERPL-MCNC: 99.8 PG/ML (ref 18.4–80.1)
SODIUM SERPL-SCNC: 137 MMOL/L (ref 136–145)
TSH SERPL DL<=0.05 MIU/L-ACNC: 2.05 UIU/ML (ref 0.36–3.74)

## 2021-03-17 PROCEDURE — 83970 ASSAY OF PARATHORMONE: CPT

## 2021-03-17 PROCEDURE — 83036 HEMOGLOBIN GLYCOSYLATED A1C: CPT

## 2021-03-17 PROCEDURE — 36415 COLL VENOUS BLD VENIPUNCTURE: CPT

## 2021-03-17 PROCEDURE — 84443 ASSAY THYROID STIM HORMONE: CPT

## 2021-03-17 PROCEDURE — 80053 COMPREHEN METABOLIC PANEL: CPT

## 2021-03-17 PROCEDURE — 82330 ASSAY OF CALCIUM: CPT

## 2021-03-17 PROCEDURE — 82306 VITAMIN D 25 HYDROXY: CPT

## 2021-03-18 ENCOUNTER — NURSE TRIAGE (OUTPATIENT)
Dept: OTHER | Facility: OTHER | Age: 66
End: 2021-03-18

## 2021-03-18 DIAGNOSIS — Z20.828 EXPOSURE TO SARS VIRUS: Primary | ICD-10-CM

## 2021-03-18 DIAGNOSIS — Z20.828 EXPOSURE TO SARS VIRUS: ICD-10-CM

## 2021-03-18 PROCEDURE — U0005 INFEC AGEN DETEC AMPLI PROBE: HCPCS | Performed by: PHYSICIAN ASSISTANT

## 2021-03-18 PROCEDURE — U0003 INFECTIOUS AGENT DETECTION BY NUCLEIC ACID (DNA OR RNA); SEVERE ACUTE RESPIRATORY SYNDROME CORONAVIRUS 2 (SARS-COV-2) (CORONAVIRUS DISEASE [COVID-19]), AMPLIFIED PROBE TECHNIQUE, MAKING USE OF HIGH THROUGHPUT TECHNOLOGIES AS DESCRIBED BY CMS-2020-01-R: HCPCS | Performed by: PHYSICIAN ASSISTANT

## 2021-03-18 NOTE — TELEPHONE ENCOUNTER
Reason for Disposition   [1] CLOSE CONTACT COVID-19 EXPOSURE within last 14 days AND [2] needs COVID-19 lab test to return to work AND [3] NO symptoms    Answer Assessment - Initial Assessment Questions  1  COVID-19 CLOSE CONTACT: "Who is the person with the confirmed or suspected COVID-19 infection that you were exposed to?"      Positive Family member  2  PLACE of CONTACT: "Where were you when you were exposed to COVID-19?" (e g , home, school, medical waiting room; which city?)   Saturday 3/13/21 Birthday party  3  TYPE of CONTACT: "How much contact was there?" (e g , sitting next to, live in same house, work in same office, same building)      Casual Close contact  4  DURATION of CONTACT: "How long were you in contact with the COVID-19 patient?" (e g , a few seconds, passed by person, a few minutes, 15 minutes or longer, live with the patient)    3-4 hours close casual contact  5  MASK: "Were you wearing a mask?" "Was the other person wearing a mask?" Note: wearing a mask reduces the risk of an otherwise close contact  No masks  6  DATE of CONTACT: "When did you have contact with a COVID-19 patient?" (e g , how many days ago)     3/13/21  8  SYMPTOMS: "Do you have any symptoms?" (e g , fever, cough, breathing difficulty, loss of taste or smell)  Denies    10  HIGH RISK: "Do you have any heart or lung problems?  Do you have a weak immune system?" (e g , heart failure, COPD, asthma, HIV positive, chemotherapy, renal failure, diabetes mellitus, sickle cell anemia, obesity)  Diabetes    Protocols used: CORONAVIRUS (COVID-19) EXPOSURE-ADULT-

## 2021-03-18 NOTE — TELEPHONE ENCOUNTER
Regarding: COVID Test Request - Asymptomatic- no symptoms  ----- Message from Remi Montero sent at 3/18/2021  5:17 PM EDT -----  COVID Test Request - Asymptomatic- no symptoms - Direct contact with COVID PT  A family friend

## 2021-03-19 ENCOUNTER — TELEPHONE (OUTPATIENT)
Dept: FAMILY MEDICINE CLINIC | Facility: CLINIC | Age: 66
End: 2021-03-19

## 2021-03-19 LAB — SARS-COV-2 RNA RESP QL NAA+PROBE: NEGATIVE

## 2021-03-19 NOTE — TELEPHONE ENCOUNTER
Pt left msg on voicemail that she was exposed to someone on 3/13 who is now Covid positive   She stated 3/18 would be the 5th day and she would like a Covid test     According to chart- Pt went to [facility] for Eval and Covid test

## 2021-03-19 NOTE — TELEPHONE ENCOUNTER
She can quarantine x 7 days if the test if COVID test is negative  She is to reschedule vaccine if falls within her quarantine timeline and resume once out of quarantine  Pushing it back will not affect the efficacy of the vaccine

## 2021-03-19 NOTE — TELEPHONE ENCOUNTER
Pt is steffen'd tomorrow for her 2nd Moderna vaccine, she does not know if she is suppose to have this  She also said that the  told her to quarantine for 7 days and not 10  She would like clarification

## 2021-03-19 NOTE — TELEPHONE ENCOUNTER
COVID swab is pending ( I cosigned it)  Please ensure patient is quarantining x 10 days from last exposure date

## 2021-03-20 ENCOUNTER — IMMUNIZATIONS (OUTPATIENT)
Dept: FAMILY MEDICINE CLINIC | Facility: HOSPITAL | Age: 66
End: 2021-03-20

## 2021-03-20 DIAGNOSIS — Z23 ENCOUNTER FOR IMMUNIZATION: Primary | ICD-10-CM

## 2021-03-20 PROCEDURE — 91301 SARS-COV-2 / COVID-19 MRNA VACCINE (MODERNA) 100 MCG: CPT

## 2021-03-20 PROCEDURE — 0012A SARS-COV-2 / COVID-19 MRNA VACCINE (MODERNA) 100 MCG: CPT

## 2021-03-25 ENCOUNTER — TELEPHONE (OUTPATIENT)
Dept: ADMINISTRATIVE | Facility: OTHER | Age: 66
End: 2021-03-25

## 2021-03-25 ENCOUNTER — OFFICE VISIT (OUTPATIENT)
Dept: FAMILY MEDICINE CLINIC | Facility: CLINIC | Age: 66
End: 2021-03-25
Payer: MEDICARE

## 2021-03-25 VITALS
HEART RATE: 64 BPM | TEMPERATURE: 98.4 F | WEIGHT: 208 LBS | HEIGHT: 66 IN | OXYGEN SATURATION: 97 % | DIASTOLIC BLOOD PRESSURE: 80 MMHG | SYSTOLIC BLOOD PRESSURE: 118 MMHG | BODY MASS INDEX: 33.43 KG/M2

## 2021-03-25 DIAGNOSIS — Z78.0 POSTMENOPAUSAL: ICD-10-CM

## 2021-03-25 DIAGNOSIS — F33.9 DEPRESSION, RECURRENT (HCC): ICD-10-CM

## 2021-03-25 DIAGNOSIS — E55.9 VITAMIN D INSUFFICIENCY: ICD-10-CM

## 2021-03-25 DIAGNOSIS — R93.7 ABNORMAL BONE DENSITY SCREENING: ICD-10-CM

## 2021-03-25 DIAGNOSIS — E21.3 HYPERPARATHYROIDISM (HCC): ICD-10-CM

## 2021-03-25 DIAGNOSIS — Z00.00 ANNUAL PHYSICAL EXAM: Primary | ICD-10-CM

## 2021-03-25 DIAGNOSIS — Z23 ENCOUNTER FOR IMMUNIZATION: ICD-10-CM

## 2021-03-25 DIAGNOSIS — R74.8 ALKALINE PHOSPHATASE ELEVATION: ICD-10-CM

## 2021-03-25 DIAGNOSIS — I10 ESSENTIAL HYPERTENSION: ICD-10-CM

## 2021-03-25 DIAGNOSIS — R17 ELEVATED BILIRUBIN: ICD-10-CM

## 2021-03-25 DIAGNOSIS — E11.9 TYPE 2 DIABETES MELLITUS WITHOUT COMPLICATION, WITHOUT LONG-TERM CURRENT USE OF INSULIN (HCC): ICD-10-CM

## 2021-03-25 DIAGNOSIS — Z12.4 SCREENING FOR CERVICAL CANCER: ICD-10-CM

## 2021-03-25 PROCEDURE — G0438 PPPS, INITIAL VISIT: HCPCS | Performed by: PHYSICIAN ASSISTANT

## 2021-03-25 RX ORDER — METFORMIN HYDROCHLORIDE 500 MG/1
500 TABLET, EXTENDED RELEASE ORAL
Qty: 90 TABLET | Refills: 1 | Status: SHIPPED | OUTPATIENT
Start: 2021-03-25 | End: 2021-06-25

## 2021-03-25 NOTE — LETTER
Diabetic Eye Exam Form    Date Requested: 21  Patient: Ana Paula Sky  Patient : [id number]   Referring Provider: Richard Cannon PA-C    Dilated Retinal Exam, Optomap-Iris Exam, or Fundus Photography Done         Yes ([place] one above)         No     Date of Diabetic Eye Exam ______________________________  Left Eye      Exam did show retinopathy    Exam did not show retinopathy         Mild       Moderate       None       Proliferative       Severe     Right Eye     Exam did show retinopathy    Exam did not show retinopathy         Mild       Moderate       None       Proliferative       Severe     Comments __________________________________________________________    Practice Providing Exam ______________________________________________    Exam Performed By (print name) _______________________________________      Provider Signature ___________________________________________________      These reports are needed for  compliance    Please fax this completed form and a copy of the Diabetic Eye Exam report to our office located at [place] [address] as soon as possible to [phone] raoul Napier: Phone [phone]    We thank you for your assistance in treating our mutual patient

## 2021-03-25 NOTE — PATIENT INSTRUCTIONS
Wellness Visit for Adults   AMBULATORY CARE:   A wellness visit  is when you see your healthcare provider to get screened for health problems  Your healthcare provider will also give you advice on how to stay healthy  Write down your questions so you remember to ask them  Ask your healthcare provider how often you should have a wellness visit  What happens at a wellness visit:  Your healthcare provider will ask about your health, and your family history of health problems  This includes high blood pressure, heart disease, and cancer  He or she will ask if you have symptoms that concern you, if you smoke, and about your mood  You may also be asked about your intake of medicines, supplements, food, and alcohol  Any of the following may be done:  · Your weight  will be checked  Your height may also be checked so your body mass index (BMI) can be calculated  Your BMI shows if you are at a healthy weight  · Your blood pressure  and heart rate will be checked  Your temperature may also be checked  · Blood and urine tests  may be done  Blood tests may be done to check your cholesterol levels  Abnormal cholesterol levels increase your risk for heart disease and stroke  You may also need a blood or urine test to check for diabetes if you are at increased risk  Urine tests may be done to look for signs of an infection or kidney disease  · A physical exam  includes checking your heartbeat and lungs with a stethoscope  Your healthcare provider may also check your skin to look for sun damage  · Screening tests  may be recommended  A screening test is done to check for diseases that may not cause symptoms  The screening tests you may need depend on your age, gender, family history, and lifestyle habits  For example, colorectal screening may be recommended if you are 48years old or older  Screening tests you need if you are a woman:   · A Pap smear  is used to screen for cervical cancer   Pap smears are usually done every 3 to 5 years depending on your age  You may need them more often if you have had abnormal Pap smear test results in the past  Ask your healthcare provider how often you should have a Pap smear  · A mammogram  is an x-ray of your breasts to screen for breast cancer  Experts recommend mammograms every 2 years starting at age 48 years  You may need a mammogram at age 52 years or younger if you have an increased risk for breast cancer  Talk to your healthcare provider about when you should start having mammograms and how often you need them  Vaccines you may need:   · Get an influenza vaccine  every year  The influenza vaccine protects you from the flu  Several types of viruses cause the flu  The viruses change over time, so new vaccines are made each year  · Get a tetanus-diphtheria (Td) booster vaccine  every 10 years  This vaccine protects you against tetanus and diphtheria  Tetanus is a severe infection that may cause painful muscle spasms and lockjaw  Diphtheria is a severe bacterial infection that causes a thick covering in the back of your mouth and throat  · Get a human papillomavirus (HPV) vaccine  if you are female and aged 23 to 32 or male 23 to 24 and never received it  This vaccine protects you from HPV infection  HPV is the most common infection spread by sexual contact  HPV may also cause vaginal, penile, and anal cancers  · Get a pneumococcal vaccine  if you are aged 72 years or older  The pneumococcal vaccine is an injection given to protect you from pneumococcal disease  Pneumococcal disease is an infection caused by pneumococcal bacteria  The infection may cause pneumonia, meningitis, or an ear infection  · Get a shingles vaccine  if you are 60 or older, even if you have had shingles before  The shingles vaccine is an injection to protect you from the varicella-zoster virus  This is the same virus that causes chickenpox   Shingles is a painful rash that develops in people who had chickenpox or have been exposed to the virus  How to eat healthy:  My Plate is a model for planning healthy meals  It shows the types and amounts of foods that should go on your plate  Fruits and vegetables make up about half of your plate, and grains and protein make up the other half  A serving of dairy is included on the side of your plate  The amount of calories and serving sizes you need depends on your age, gender, weight, and height  Examples of healthy foods are listed below:  · Eat a variety of vegetables  such as dark green, red, and orange vegetables  You can also include canned vegetables low in sodium (salt) and frozen vegetables without added butter or sauces  · Eat a variety of fresh fruits , canned fruit in 100% juice, frozen fruit, and dried fruit  · Include whole grains  At least half of the grains you eat should be whole grains  Examples include whole-wheat bread, wheat pasta, brown rice, and whole-grain cereals such as oatmeal     · Eat a variety of protein foods such as seafood (fish and shellfish), lean meat, and poultry without skin (turkey and chicken)  Examples of lean meats include pork leg, shoulder, or tenderloin, and beef round, sirloin, tenderloin, and extra lean ground beef  Other protein foods include eggs and egg substitutes, beans, peas, soy products, nuts, and seeds  · Choose low-fat dairy products such as skim or 1% milk or low-fat yogurt, cheese, and cottage cheese  · Limit unhealthy fats  such as butter, hard margarine, and shortening  Exercise:  Exercise at least 30 minutes per day on most days of the week  Some examples of exercise include walking, biking, dancing, and swimming  You can also fit in more physical activity by taking the stairs instead of the elevator or parking farther away from stores  Include muscle strengthening activities 2 days each week  Regular exercise provides many health benefits   It helps you manage your weight, and decreases your risk for type 2 diabetes, heart disease, stroke, and high blood pressure  Exercise can also help improve your mood  Ask your healthcare provider about the best exercise plan for you  General health and safety guidelines:   · Do not smoke  Nicotine and other chemicals in cigarettes and cigars can cause lung damage  Ask your healthcare provider for information if you currently smoke and need help to quit  E-cigarettes or smokeless tobacco still contain nicotine  Talk to your healthcare provider before you use these products  · Limit alcohol  A drink of alcohol is 12 ounces of beer, 5 ounces of wine, or 1½ ounces of liquor  · Lose weight, if needed  Being overweight increases your risk of certain health conditions  These include heart disease, high blood pressure, type 2 diabetes, and certain types of cancer  · Protect your skin  Do not sunbathe or use tanning beds  Use sunscreen with a SPF 15 or higher  Apply sunscreen at least 15 minutes before you go outside  Reapply sunscreen every 2 hours  Wear protective clothing, hats, and sunglasses when you are outside  · Drive safely  Always wear your seatbelt  Make sure everyone in your car wears a seatbelt  A seatbelt can save your life if you are in an accident  Do not use your cell phone when you are driving  This could distract you and cause an accident  Pull over if you need to make a call or send a text message  · Practice safe sex  Use latex condoms if are sexually active and have more than one partner  Your healthcare provider may recommend screening tests for sexually transmitted infections (STIs)  · Wear helmets, lifejackets, and protective gear  Always wear a helmet when you ride a bike or motorcycle, go skiing, or play sports that could cause a head injury  Wear protective equipment when you play sports  Wear a lifejacket when you are on a boat or doing water sports      © Copyright [employer] 2020 Information is for End User's use only and may not be sold, redistributed or otherwise used for commercial purposes  All illustrations and images included in CareNotes® are the copyrighted property of [employer]  or Sarah Aldridge  The above information is an  only  It is not intended as medical advice for individual conditions or treatments  Talk to your doctor, nurse or pharmacist before following any medical regimen to see if it is safe and effective for you  Weight Management   AMBULATORY CARE:   Why it is important to manage your weight:  Being overweight increases your risk of health conditions such as heart disease, high blood pressure, type 2 diabetes, and certain types of cancer  It can also increase your risk for osteoarthritis, sleep apnea, and other respiratory problems  Aim for a slow, steady weight loss  Even a small amount of weight loss can lower your risk of health problems  How to lose weight safely:  A safe and healthy way to lose weight is to eat fewer calories and get regular exercise  · You can lose up about 1 pound a week by decreasing the number of calories you eat by 500 calories each day  You can decrease calories by eating smaller portion sizes or by cutting out high-calorie foods  Read labels to find out how many calories are in the foods you eat  · You can also burn calories with exercise such as walking, swimming, or biking  You will be more likely to keep weight off if you make these changes part of your lifestyle  Exercise at least 30 minutes per day on most days of the week  You can also fit in more physical activity by taking the stairs instead of the elevator or parking farther away from stores  Ask your healthcare provider about the best exercise plan for you  Healthy meal plan for weight management:  A healthy meal plan includes a variety of foods, contains fewer calories, and helps you stay healthy   A healthy meal plan includes the following:     · Eat whole-grain foods more often  A healthy meal plan should contain fiber  Fiber is the part of grains, fruits, and vegetables that is not broken down by your body  Whole-grain foods are healthy and provide extra fiber in your diet  Some examples of whole-grain foods are whole-wheat breads and pastas, oatmeal, brown rice, and bulgur  · Eat a variety of vegetables every day  Include dark, leafy greens such as spinach, kale, bailee greens, and mustard greens  Eat yellow and orange vegetables such as carrots, sweet potatoes, and winter squash  · Eat a variety of fruits every day  Choose fresh or canned fruit (canned in its own juice or light syrup) instead of juice  Fruit juice has very little or no fiber  · Eat low-fat dairy foods  Drink fat-free (skim) milk or 1% milk  Eat fat-free yogurt and low-fat cottage cheese  Try low-fat cheeses such as mozzarella and other reduced-fat cheeses  · Choose meat and other protein foods that are low in fat  Choose beans or other legumes such as split peas or lentils  Choose fish, skinless poultry (chicken or turkey), or lean cuts of red meat (beef or pork)  Before you cook meat or poultry, cut off any visible fat  · Use less fat and oil  Try baking foods instead of frying them  Add less fat, such as margarine, sour cream, regular salad dressing and mayonnaise to foods  Eat fewer high-fat foods  Some examples of high-fat foods include french fries, doughnuts, ice cream, and cakes  · Eat fewer sweets  Limit foods and drinks that are high in sugar  This includes candy, cookies, regular soda, and sweetened drinks  Ways to decrease calories:   · Eat smaller portions  ? Use a small plate with smaller servings  ? Do not eat second helpings  ? When you eat at a restaurant, ask for a box and place half of your meal in the box before you eat  ? Share an entrée with someone else  · Replace high-calorie snacks with healthy, low-calorie snacks  ? Choose fresh fruit, vegetables, fat-free rice cakes, or air-popped popcorn instead of potato chips, nuts, or chocolate  ? Choose water or calorie-free drinks instead of soda or sweetened drinks  · Do not shop for groceries when you are hungry  You may be more likely to make unhealthy food choices  Take a grocery list of healthy foods and shop after you have eaten  · Eat regular meals  Do not skip meals  Skipping meals can lead to overeating later in the day  This can make it harder for you to lose weight  Eat a healthy snack in place of a meal if you do not have time to eat a regular meal  Talk with a dietitian to help you create a meal plan and schedule that is right for you  Other things to consider as you try to lose weight:   · Be aware of situations that may give you the urge to overeat, such as eating while watching television  Find ways to avoid these situations  For example, read a book, go for a walk, or do crafts  · Meet with a weight loss support group or friends who are also trying to lose weight  This may help you stay motivated to continue working on your weight loss goals  © Copyright [address] Information is for End User's use only and may not be sold, redistributed or otherwise used for commercial purposes  All illustrations and images included in CareNotes® are the copyrighted property of [employer]  or Sarah Aldridge  The above information is an  only  It is not intended as medical advice for individual conditions or treatments  Talk to your doctor, nurse or pharmacist before following any medical regimen to see if it is safe and effective for you

## 2021-03-25 NOTE — PROGRESS NOTES
ADULT ANNUAL [id number]    NAME: Jerry Steele  AGE: 77 y o  SEX: female  : 1955     DATE: 3/25/2021     Assessment and Plan:     Problem List Items Addressed This Visit        Endocrine    DMII (diabetes mellitus, type 2) ([facility] )    Relevant Medications    metFORMIN (GLUCOPHAGE-XR) 500 mg 24 hr tablet  - has been keeping stable with diet but a1c persistently above 6 5, will start low dose metformin       Cardiovascular and Mediastinum    Essential hypertension  - stable, no changes       Other    Depression, recurrent ([facility] )  - continue zoloft    Annual physical exam - Primary    Alkaline phosphatase elevation    Relevant Orders    US right upper quadrant    Elevated bilirubin    Relevant Orders    US right upper quadrant      Other Visit Diagnoses     Screening for cervical cancer        Relevant Orders    Ambulatory referral to Obstetrics / Gynecology    Encounter for immunization        due for pneumovax but just 5 days out from COVID vaccine    BMI 33 0-33 9,adult        Hyperparathyroidism ([facility] )        Relevant Orders    Ambulatory referral to Endocrinology    DXA bone density spine hip and pelvis  Calcium normalized since stopping otc calcium supplement    Abnormal bone density screening        Relevant Orders    DXA bone density spine hip and pelvis    Postmenopausal        Relevant Orders    DXA bone density spine hip and pelvis    Vitamin D insufficiency      Just below low end of normal, start 1000 IU daily           Immunizations and preventive care screenings were discussed with patient today  Appropriate education was printed on patient's after visit summary  Counseling:  · Dental Health: discussed regular dental visits  · She is UTD on eye exams    BMI Counseling: Body mass index is 33 57 kg/m²   The BMI is above normal  Nutrition recommendations include decreasing portion sizes, encouraging healthy choices of fruits and vegetables, limiting drinks that contain sugar and moderation in carbohydrate intake  No follow-ups on file  Chief Complaint:     Chief Complaint   Patient presents with    Physical Exam     Discuss labs  No refills needed today  Patient had eye exam 2020 with Dr Nikos Bazzi in [place]  Message sent to care gap  History of Present Illness:     Adult Annual Physical   Patient here for a comprehensive physical exam      Diet and Physical Activity  · Diet/Nutrition: well balanced diet  · Exercise: no formal exercise  Depression Screening  PHQ-9 Depression Screening    PHQ-9:   Frequency of the following problems over the past two weeks:      Little interest or pleasure in doing things: 1 - several days  Feeling down, depressed, or hopeless: 1 - several days  Trouble falling or staying asleep, or sleeping too much: 0 - not at all  Feeling tired or having little energy: 1 - several days  Poor appetite or overeatin - several days  Feeling bad about yourself - or that you are a failure or have let yourself or your family down: 1 - several days  Trouble concentrating on things, such as reading the newspaper or watching television: 1 - several days  Moving or speaking so slowly that other people could have noticed  Or the opposite - being so fidgety or restless that you have been moving around a lot more than usual: 1 - several days  Thoughts that you would be better off dead, or of hurting yourself in some way: 0 - not at all     She feels stable on this dose of zoloft  General Health  · Sleep: gets 7-8 hours of sleep on average  · Hearing: requires use of hearing aids  · Vision: goes for regular eye exams and Dr Nikos Bazzi  · Dental: regular dental visits and once per year for denures         /GYN Health  · Patient is: postmenopausal  · Due for mammogram and dexa     Review of Systems:     Review of Systems   Constitutional: Negative for appetite change, diaphoresis, fever and unexpected weight change  Respiratory: Negative  Cardiovascular: Negative  Gastrointestinal: Negative  Genitourinary: Negative  Skin: Negative  Neurological: Negative  Psychiatric/Behavioral: Positive for dysphoric mood  Negative for sleep disturbance and suicidal ideas  Past Medical History:     Past Medical History:   Diagnosis Date    Anxiety     Carcinoma ([address] )     carcinoma of the skin    Chronic pain disorder     Right ankle fracture    Depression     Diabetes mellitus (HCC)     Type 2    Diverticulosis     GERD (gastroesophageal reflux disease)     Heart murmur     mitral valve insufficiency    Hypertension     Tenosynovitis, de Quervain     Vertigo     last assessed 8/7/2012      Past Surgical History:     Past Surgical History:   Procedure Laterality Date    CHOLECYSTECTOMY      COLONOSCOPY      COLPOSCOPY W/ BIOPSY / CURETTAGE      cervix  last assessed 8/11/1998    ENDOMETRIAL BIOPSY      onset 4/13/2011    FOOT SURGERY      R foot and ankle fracture, fused with screws    HYSTEROSCOPY W/ POLYPECTOMY      SD COLONOSCOPY FLX DX W/COLLJ SPEC WHEN PFRMD N/A 6/20/2017    Procedure: COLONOSCOPY;  Surgeon: Gris Caballero MD;  Location: [facility]; Service: General    TONSILLECTOMY        Social History:        Social History     Socioeconomic History    Marital status:       Spouse name: None    Number of children: None    Years of education: None    Highest education level: None   Occupational History    None   Social Needs    Financial resource strain: None    Food insecurity     Worry: None     Inability: None    Transportation needs     Medical: Yes     Non-medical: Yes   Tobacco Use    Smoking status: Never Smoker    Smokeless tobacco: Never Used    Tobacco comment: as per [employer]scripts second hand  smoke exosure    quit june 2016   Substance and Sexual Activity    Alcohol use: No    Drug use: No    Sexual activity: Not Currently Partners: Male   Lifestyle    Physical activity     Days per week: None     Minutes per session: None    Stress: None   Relationships    Social connections     Talks on phone: None     Gets together: None     Attends [employer] service: None     Active member of club or organization: None     Attends meetings of clubs or organizations: None     Relationship status: None    Intimate partner violence     Fear of current or ex partner: None     Emotionally abused: None     Physically abused: None     Forced sexual activity: None   Other Topics Concern    None   Social History Narrative    Caffeine Use    Uses safety equipment- seatbelts      Family History:     Family History   Problem Relation Age of Onset    Breast cancer Mother         Breast surgery mastectomy    Dementia Mother     Colon cancer Father     Liver cancer Father       Current Medications:     Current Outpatient Medications   Medication Sig Dispense Refill    Bimatoprost (LUMIGAN OP) Apply 1 drop to eye daily at bedtime      cetirizine (ZyrTEC) 10 mg tablet Take 10 mg by mouth daily      cyanocobalamin (VITAMIN B-12) 100 mcg tablet Take by mouth daily      diclofenac sodium (VOLTAREN) 1 % Place on the skin daily      famotidine (PEPCID) 20 mg tablet Take 20 mg by mouth daily as needed       fluticasone (FLONASE) 50 mcg/act nasal spray Use 2 spray(s) in each nostril once daily 16 g 1    hydrochlorothiazide (HYDRODIURIL) 25 mg tablet Take 1 tablet by mouth once daily 90 tablet 1    losartan (COZAAR) 100 MG tablet Take 1 tablet by mouth once daily 90 tablet 1    LUMIGAN 0 01 % ophthalmic drops   3    Multiple Vitamins-Minerals (MULTIVITAMIN ADULT PO) Take by mouth      sertraline (ZOLOFT) 50 mg tablet Take 1 tablet by mouth once daily 90 tablet 1    verapamil (CALAN-SR) 120 mg CR tablet Take 1 tablet (120 mg total) by mouth daily at bedtime 90 tablet 1    CALCIUM-VITAMIN D PO Take 1 tablet by mouth daily      metFORMIN (GLUCOPHAGE-XR) 500 mg 24 hr tablet Take 1 tablet (500 mg total) by mouth daily with dinner 90 tablet 1     No current facility-administered medications for this visit  Allergies:     No Known Allergies   Physical Exam:     /80 (BP Location: Left arm, Patient Position: Sitting, Cuff Size: Large)   Pulse 64   Temp 98 4 °F (36 9 °C) (Tympanic)   Ht 5' 6" (1 676 m)   Wt 94 3 kg (208 lb)   SpO2 97%   BMI 33 57 kg/m²     Physical Exam  Constitutional:       General: She is not in acute distress  Appearance: Normal appearance  She is obese  HENT:      Head: Normocephalic and atraumatic  Right Ear: Tympanic membrane, ear canal and external ear normal       Left Ear: Tympanic membrane, ear canal and external ear normal       Nose: Nose normal       Mouth/Throat:      Mouth: Mucous membranes are moist       Pharynx: Oropharynx is clear  Comments: dentures  Eyes:      Conjunctiva/sclera: Conjunctivae normal       Pupils: Pupils are equal, round, and reactive to light  Neck:      Musculoskeletal: Normal range of motion and neck supple  Cardiovascular:      Rate and Rhythm: Normal rate and regular rhythm  Heart sounds: No murmur  Pulmonary:      Effort: Pulmonary effort is normal  No respiratory distress  Breath sounds: Normal breath sounds  No wheezing  Musculoskeletal:         General: No deformity or signs of injury  Skin:     General: Skin is warm and dry  Coloration: Skin is not pale  Findings: No rash  Neurological:      General: No focal deficit present  Mental Status: She is alert and oriented to person, place, and time  Cranial Nerves: No cranial nerve deficit  Psychiatric:         Mood and Affect: Mood normal          Behavior: Behavior normal          Thought Content:  Thought content normal           Harriet Jerome PA-C  [facility] AT [facility]

## 2021-03-25 NOTE — TELEPHONE ENCOUNTER
----- Message from Khanh Nelson sent at 3/25/2021  9:48 AM EDT -----  Regarding: dm eye exam  03/25/21 9:49 AM    Hello, our patient Melissa Cadet has had Diabetic Eye Exam completed/performed  Please assist in updating the patient chart by making an External outreach to Dr Jamie Hester, [facility] facility located in [place]  The date of service is 7/14/2020      Thank you,  Larissa Edmonds MA  [facility]

## 2021-03-25 NOTE — LETTER
Diabetic Eye Exam Form    Date Requested: 21  Patient: Earline Shultz  Patient : 1955   Referring Provider: Keke Hawkins PA-C    Dilated Retinal Exam, Optomap-Iris Exam, or Fundus Photography Done         Yes ([place] one above)         No     Date of Diabetic Eye Exam ______________________________  Left Eye      Exam did show retinopathy    Exam did not show retinopathy         Mild       Moderate       None       Proliferative       Severe     Right Eye     Exam did show retinopathy    Exam did not show retinopathy         Mild       Moderate       None       Proliferative       Severe     Comments __________________________________________________________    Practice Providing Exam ______________________________________________    Exam Performed By (print name) _______________________________________      Provider Signature ___________________________________________________      These reports are needed for  compliance    Please fax this completed form and a copy of the Diabetic Eye Exam report to our office located at [place] [address] as soon as possible to [phone] attention Star Glatter: Phone [phone]    We thank you for your assistance in treating our mutual patient

## 2021-03-26 NOTE — TELEPHONE ENCOUNTER
Upon review of the In Basket request and the patient's chart, initial outreach has been made via fax, please see Contacts section for details       Thank you  Meli Del Rosario

## 2021-03-30 NOTE — TELEPHONE ENCOUNTER
As a follow-up, a second attempt has been made for outreach via fax, please see Contacts section for details       Dr Jacqueline Otero  [phone]    Thank you  Verona Zuñiga

## 2021-04-01 ENCOUNTER — HOSPITAL ENCOUNTER (OUTPATIENT)
Dept: BONE DENSITY | Facility: HOSPITAL | Age: 66
Discharge: HOME | End: 2021-04-01
Payer: MEDICARE

## 2021-04-01 DIAGNOSIS — E21.3 HYPERPARATHYROIDISM (HCC): ICD-10-CM

## 2021-04-01 DIAGNOSIS — Z78.0 POSTMENOPAUSAL: ICD-10-CM

## 2021-04-01 DIAGNOSIS — R93.7 ABNORMAL BONE DENSITY SCREENING: ICD-10-CM

## 2021-04-01 PROCEDURE — 77080 DXA BONE DENSITY AXIAL: CPT

## 2021-04-02 NOTE — TELEPHONE ENCOUNTER
As a final attempt, a third outreach has been made via telephone call  Please see Contacts section for details  This encounter will be closed and completed by end of day  Should we receive the requested information because of previous outreach attempts, the requested patient's chart will be updated appropriately       Thank you  Naima Talbot

## 2021-04-06 NOTE — TELEPHONE ENCOUNTER
Upon review of the In Basket request we were able to locate, review, and update the patient chart as requested for Diabetic Eye Exam     Any additional questions or concerns should be emailed to the Practice Liaisons via [email or web address] email, please do not reply via In [email or web address]      Thank you  Lea Shoemaker

## 2021-04-07 ENCOUNTER — HOSPITAL ENCOUNTER (OUTPATIENT)
Dept: ULTRASOUND IMAGING | Facility: HOSPITAL | Age: 66
Discharge: HOME | End: 2021-04-07
Payer: MEDICARE

## 2021-04-07 DIAGNOSIS — R74.8 ALKALINE PHOSPHATASE ELEVATION: ICD-10-CM

## 2021-04-07 DIAGNOSIS — R17 ELEVATED BILIRUBIN: ICD-10-CM

## 2021-04-07 PROCEDURE — 76705 ECHO EXAM OF ABDOMEN: CPT

## 2021-04-21 ENCOUNTER — OFFICE VISIT (OUTPATIENT)
Dept: OBGYN CLINIC | Facility: CLINIC | Age: 66
End: 2021-04-21
Payer: MEDICARE

## 2021-04-21 VITALS
HEIGHT: 66 IN | WEIGHT: 206.8 LBS | BODY MASS INDEX: 33.23 KG/M2 | SYSTOLIC BLOOD PRESSURE: 132 MMHG | DIASTOLIC BLOOD PRESSURE: 76 MMHG

## 2021-04-21 DIAGNOSIS — N39.3 STRESS INCONTINENCE IN FEMALE: ICD-10-CM

## 2021-04-21 DIAGNOSIS — N81.4 UTEROVAGINAL PROLAPSE: ICD-10-CM

## 2021-04-21 DIAGNOSIS — Z12.4 ENCOUNTER FOR SCREENING FOR MALIGNANT NEOPLASM OF CERVIX: ICD-10-CM

## 2021-04-21 DIAGNOSIS — Z12.31 ENCOUNTER FOR SCREENING MAMMOGRAM FOR BREAST CANCER: ICD-10-CM

## 2021-04-21 DIAGNOSIS — Z01.419 ENCOUNTER FOR WELL WOMAN EXAM WITH ROUTINE GYNECOLOGICAL EXAM: Primary | ICD-10-CM

## 2021-04-21 PROCEDURE — G0145 SCR C/V CYTO,THINLAYER,RESCR: HCPCS | Performed by: ADVANCED PRACTICE MIDWIFE

## 2021-04-21 PROCEDURE — 87624 HPV HI-RISK TYP POOLED RSLT: CPT | Performed by: ADVANCED PRACTICE MIDWIFE

## 2021-04-21 PROCEDURE — G0101 CA SCREEN;PELVIC/BREAST EXAM: HCPCS | Performed by: ADVANCED PRACTICE MIDWIFE

## 2021-04-21 NOTE — PROGRESS NOTES
[facility] [address] [place] [address]    ASSESSMENT/PLAN: Wai Middleton is a 77 y o   who presents for annual gynecologic exam     Encounter for routine gynecologic examination  - Routine well woman exam completed today  - Cervical Cancer Screening complete completed as part of visit today  Unknown history    - STI screening offered including HIV: not indicated based on hx  - Breast Cancer Screening: Last Mammogram 10/05/2020, Script given today with phone number  - Colorectal cancer screening was not ordered  Up to date  - The following were reviewed in today's visit: breast self exam, mammography screening ordered, adequate intake of calcium and vitamin D, exercise and age related changes  RTO 2 yrs    Additional problems addressed at this visit:  1  Encounter for well woman exam with routine gynecological exam  -     Liquid-based pap, screening  -     Mammo screening bilateral w 3d & cad; Future; Expected date: 2021    2  Encounter for screening for malignant neoplasm of cervix  -     Liquid-based pap, screening    3  Encounter for screening mammogram for breast cancer  -     Mammo screening bilateral w 3d & cad; Future; Expected date: 2021    4  Uterovaginal prolapse  - Reviewed s/s requiring intervention and possible intervention options  5  Stress incontinence in female  Reviewed Kegel exercises  CC:  Annual Gynecologic Examination    HPI: Wai Middleton is a 77 y o   who presents for annual gynecologic examination  Saige Cage presents today for gyn exam  She states that she went through menopause at age 62  She has not had any vaginal bleeding since menopause  Has hx of uterine fibroids and prolapse  At this time she reports an occasional loss of urine, when she has waited to long or is caught off guard  She was doing core strengthening exercises prior to Covid and felt that she had better control over bladder habits   Currently sleeping well, good intake of calcium 3-4 servings through dietary intake daily and takes a multi-vitamin  Currently watching 6month old twins and doing a lot of lifting  2 servings of caffeine daily  The following portions of the patient's history were reviewed and updated as appropriate: allergies, current medications, past family history, past medical history, obstetric history, gynecologic history, past social history, past surgical history and problem list     Review of Systems   Constitutional: Negative for activity change, appetite change, fatigue and fever  Respiratory: Negative for cough and shortness of breath  Cardiovascular: Negative for chest pain, palpitations and leg swelling  Gastrointestinal: Negative for constipation and diarrhea  Genitourinary: Positive for urgency  Negative for difficulty urinating, frequency, vaginal bleeding, vaginal discharge and vaginal pain  Neurological: Negative for light-headedness and headaches  Psychiatric/Behavioral: The patient is not nervous/anxious  Objective:  /76 (BP Location: Right arm, Patient Position: Sitting, Cuff Size: Large)   Ht 5' 6" (1 676 m)   Wt 93 8 kg (206 lb 12 8 oz)   LMP  (LMP Unknown)   BMI 33 38 kg/m²    Physical Exam  Vitals signs reviewed  Constitutional:       Appearance: Normal appearance  HENT:      Head: Normocephalic  Neck:      Musculoskeletal: Normal range of motion  Thyroid: No thyroid mass or thyroid tenderness  Cardiovascular:      Rate and Rhythm: Normal rate and regular rhythm  Heart sounds: Normal heart sounds  Pulmonary:      Effort: Pulmonary effort is normal       Breath sounds: Normal breath sounds  Chest:      Breasts:         Right: No mass, nipple discharge, skin change or tenderness  Left: No mass, nipple discharge, skin change or tenderness  Abdominal:      General: There is no distension  Palpations: There is no mass  Tenderness:  There is no abdominal tenderness  There is no guarding  Genitourinary:     General: Normal vulva  Exam position: Lithotomy position  Labia:         Right: No tenderness or lesion  Left: No tenderness or lesion  Vagina: Prolapsed vaginal walls present  No vaginal discharge, tenderness, bleeding or lesions  Cervix: No discharge, lesion, erythema or cervical bleeding  Uterus: Normal  Not enlarged, not tender and no uterine prolapse  Adnexa:         Right: No mass, tenderness or fullness  Left: No mass, tenderness or fullness  Comments: Convergent vaginal side walls, with a mild cystocele noted  Prolapse Grade 1 does not progress with valsalva  Lymphadenopathy:      Upper Body:      Right upper body: No axillary adenopathy  Left upper body: No axillary adenopathy  Skin:     General: Skin is warm and dry  Neurological:      Mental Status: She is alert     Psychiatric:         Mood and Affect: Mood normal          Behavior: Behavior normal          Judgment: Judgment normal              Nickolas Wadsworth CNM  [facility]  04/21/21 10:28 AM

## 2021-04-21 NOTE — PATIENT INSTRUCTIONS
Kegel Exercises for Women   AMBULATORY CARE:   Kegel exercises  help strengthen your pelvic muscles  Pelvic muscles hold your pelvic organs, such as your bladder and uterus, in place  Kegel exercises help prevent or control problems with urine incontinence (leakage)  Incontinence may be caused by pregnancy, childbirth, or menopause  Contact your healthcare provider if:   · You cannot feel your muscles tighten or relax  · You continue to leak urine  · You have questions or concerns about your condition or care  Use the correct muscles:  Pelvic muscles are the muscles you use to control urine flow  To target these muscles, stop and start the flow of urine several times  This will help you become familiar with how it feels to tighten and relax these muscles  How to do Kegel exercises:   · Empty your bladder  You may lie down, stand up, or sit down to do these exercises  When you first try to do these exercises, it may be easier if you lie down  Tighten or squeeze your pelvic muscles slowly  It may feel like you are trying to hold back urine or gas  Hold this position for 3 seconds  Relax for 3 seconds  Repeat this cycle 10 times  · Do 10 sets of Kegel exercises, at least 3 times a day  Do not hold your breath when you do Kegel exercises  Keep your stomach, back, and leg muscles relaxed  · As your muscles get stronger, you will be able to hold the squeeze longer  Your healthcare provider may ask that you increase your pelvic muscle squeeze to 10 seconds  After you squeeze for 10 seconds, relax for 10 seconds  What else you should know:   · Once you know how to do Kegel exercises, use different positions  You can do these exercises while you lie on the floor, sit at your desk or watch TV, and while you stand  · You may notice improved bladder control within about 6 weeks  · Tighten your pelvic muscles before you sneeze, cough, or lift to prevent urine leakage      Follow up with your healthcare provider as directed:  Write down your questions so you remember to ask them during your visits  © Copyright [address] Information is for End User's use only and may not be sold, redistributed or otherwise used for commercial purposes  All illustrations and images included in CareNotes® are the copyrighted property of A D A M , Inc  or Sarah Aldridge  The above information is an  only  It is not intended as medical advice for individual conditions or treatments  Talk to your doctor, nurse or pharmacist before following any medical regimen to see if it is safe and effective for you

## 2021-04-22 ENCOUNTER — CONSULT (OUTPATIENT)
Dept: ENDOCRINOLOGY | Facility: CLINIC | Age: 66
End: 2021-04-22
Payer: MEDICARE

## 2021-04-22 VITALS
HEART RATE: 68 BPM | HEIGHT: 66 IN | DIASTOLIC BLOOD PRESSURE: 76 MMHG | RESPIRATION RATE: 22 BRPM | SYSTOLIC BLOOD PRESSURE: 128 MMHG | BODY MASS INDEX: 33.11 KG/M2 | TEMPERATURE: 97.9 F | WEIGHT: 206 LBS | OXYGEN SATURATION: 96 %

## 2021-04-22 DIAGNOSIS — E11.9 TYPE 2 DIABETES MELLITUS WITHOUT COMPLICATION, WITHOUT LONG-TERM CURRENT USE OF INSULIN (HCC): Primary | ICD-10-CM

## 2021-04-22 DIAGNOSIS — E21.3 HYPERPARATHYROIDISM (HCC): ICD-10-CM

## 2021-04-22 DIAGNOSIS — I10 ESSENTIAL HYPERTENSION: ICD-10-CM

## 2021-04-22 PROBLEM — B35.1 ONYCHOMYCOSIS: Status: ACTIVE | Noted: 2021-04-22

## 2021-04-22 PROBLEM — Z90.49 HISTORY OF CHOLECYSTECTOMY: Status: ACTIVE | Noted: 2021-04-22

## 2021-04-22 LAB
HPV HR 12 DNA CVX QL NAA+PROBE: NEGATIVE
HPV16 DNA CVX QL NAA+PROBE: NEGATIVE
HPV18 DNA CVX QL NAA+PROBE: NEGATIVE

## 2021-04-22 PROCEDURE — 99204 OFFICE O/P NEW MOD 45 MIN: CPT | Performed by: NURSE PRACTITIONER

## 2021-04-22 NOTE — PROGRESS NOTES
New Patient Progress Note      Chief Complaint   Patient presents with    Diabetes Type 2      Referred by PCP, Glo Vines PA-C  History of Present Illness: Carol Lenz is a 77 y o  female with HTN, Vit D deficiency, and T2 DM presents to the office today at the recommendation of her PCP, Glo Vines Pa-C  Patient denies complications of diabetes including neuropathy, nephropathy, and retinopathy  Patient reports a difficult year in 2020 in which both her mother and her  passed away  She is currently focusing on making her own health a priority  She is very active with her children and five grandchildren  Type: T2DM    Onset: Less than 10 years; through routine bloodwork    Family history +T2DM mother    Medications: None    Blood Sugars: Doesn't check currently    Meter: One touch    Diet/schedule:   Babysits twin grandchildren    Eats healthfully- protein shakes, oatmeal, scrambled eggs, fruit and cottage cheese  Cooks dinner regularly, doesn't over indulge in carbohydrates or processed foods      Exercise: Very active, exercising with group classes    Influenza vaccine: Up to date    Pneumovax: Up to date    COVID-19: Up to date    Ophthalmology: 7/14/2020- no retinopathy    Podiatry/Foot care: Self-care; negative for diabetic neuropathy    Dentist: false teeth; yearly gum checks    Diabetes education:  Remote  Patient Active Problem List   Diagnosis    Hypertension    Depression, recurrent ([facility] )    Annual physical exam    Alkaline phosphatase elevation    Chronic pain of right ankle    De Quervain's tenosynovitis, left    Diverticulosis    DMII (diabetes mellitus, type 2) (HCC)    Elevated bilirubin    Esophageal reflux    Fracture of right heel    Left ventricular hypertrophy    Seasonal allergic rhinitis    Secondary mitral valve insufficiency    Uterovaginal prolapse    History of cholecystectomy    Onychomycosis    Hyperparathyroidism ([address] )      Past Medical History:   Diagnosis Date    Anxiety     Carcinoma ([facility] [address] )     carcinoma of the skin    Chronic pain disorder     Right ankle fracture    Depression     Diabetes mellitus (HCC)     Type 2    Diverticulosis     GERD (gastroesophageal reflux disease)     Heart murmur     mitral valve insufficiency    Hypertension     Tenosynovitis, de Quervain     Vertigo     last assessed 8/7/2012      Past Surgical History:   Procedure Laterality Date    CHOLECYSTECTOMY      COLONOSCOPY      COLPOSCOPY W/ BIOPSY / CURETTAGE      cervix  last assessed 8/11/1998    ENDOMETRIAL BIOPSY      onset 4/13/2011    FOOT SURGERY      R foot and ankle fracture, fused with screws    HYSTEROSCOPY W/ POLYPECTOMY      SC COLONOSCOPY FLX DX W/COLLJ SPEC WHEN PFRMD N/A 6/20/2017    Procedure: COLONOSCOPY;  Surgeon: Briseyda Herring MD;  Location: [facility];   Service: General    TONSILLECTOMY        Family History   Problem Relation Age of Onset   Andrew Fuentes Breast cancer Mother         Breast surgery mastectomy    Dementia Mother     Colon cancer Father     Liver cancer Father      Social History     Tobacco Use    Smoking status: Passive Smoke Exposure - Never Smoker    Smokeless tobacco: Never Used    Tobacco comment: as per [employer]scripts second hand  smoke exosure    quit june 2016   Substance Use Topics    Alcohol use: No     No Known Allergies      Current Outpatient Medications:     Bimatoprost (LUMIGAN OP), Apply 1 drop to eye daily at bedtime, Disp: , Rfl:     cetirizine (ZyrTEC) 10 mg tablet, Take 10 mg by mouth daily, Disp: , Rfl:     cyanocobalamin (VITAMIN B-12) 100 mcg tablet, Take by mouth daily, Disp: , Rfl:     diclofenac sodium (VOLTAREN) 1 %, Place on the skin daily, Disp: , Rfl:     famotidine (PEPCID) 20 mg tablet, Take 20 mg by mouth daily as needed , Disp: , Rfl:     fluticasone (FLONASE) 50 mcg/act nasal spray, Use 2 spray(s) in each nostril once daily, Disp: 16 g, Rfl: 1   hydrochlorothiazide (HYDRODIURIL) 25 mg tablet, Take 1 tablet by mouth once daily, Disp: 90 tablet, Rfl: 1    losartan (COZAAR) 100 MG tablet, Take 1 tablet by mouth once daily, Disp: 90 tablet, Rfl: 1    metFORMIN (GLUCOPHAGE-XR) 500 mg 24 hr tablet, Take 1 tablet (500 mg total) by mouth daily with dinner, Disp: 90 tablet, Rfl: 1    Multiple Vitamins-Minerals (MULTIVITAMIN ADULT PO), Take by mouth, Disp: , Rfl:     sertraline (ZOLOFT) 50 mg tablet, Take 1 tablet by mouth once daily, Disp: 90 tablet, Rfl: 1    verapamil (CALAN-SR) 120 mg CR tablet, Take 1 tablet (120 mg total) by mouth daily at bedtime, Disp: 90 tablet, Rfl: 1    Review of Systems   Constitutional: Positive for fatigue  Negative for activity change, appetite change and unexpected weight change  HENT: Negative for dental problem, trouble swallowing and voice change  Eyes: Negative for visual disturbance  Respiratory: Negative for cough, chest tightness and shortness of breath  Cardiovascular: Negative for chest pain, palpitations and leg swelling  Gastrointestinal: Negative for constipation, diarrhea, nausea and vomiting  Endocrine: Positive for polydipsia  Negative for cold intolerance, heat intolerance, polyphagia and polyuria  Genitourinary: Negative for frequency  Musculoskeletal: Negative for arthralgias, back pain, gait problem, joint swelling and myalgias  Skin: Negative for wound  Allergic/Immunologic: Negative for environmental allergies and food allergies  Neurological: Positive for numbness (R foot)  Negative for dizziness, weakness, light-headedness and headaches  Psychiatric/Behavioral: Negative for decreased concentration, dysphoric mood and sleep disturbance  The patient is not nervous/anxious  Physical Exam:  Body mass index is 33 25 kg/m²    /76 (BP Location: Left arm, Patient Position: Sitting, Cuff Size: Adult)   Pulse 68   Temp 97 9 °F (36 6 °C) (Tympanic)   Resp 22   Ht 5' 6" (1 676 m)   Wt 93 4 kg (206 lb)   LMP  (LMP Unknown)   SpO2 96%   BMI 33 25 kg/m²    Wt Readings from Last 3 Encounters:   04/22/21 93 4 kg (206 lb)   04/21/21 93 8 kg (206 lb 12 8 oz)   03/25/21 94 3 kg (208 lb)       Physical Exam  Vitals signs reviewed  Constitutional:       General: She is not in acute distress  Appearance: She is well-developed  She is not ill-appearing  HENT:      Head: Normocephalic and atraumatic  Comments: Mask in place  Eyes:      Pupils: Pupils are equal, round, and reactive to light  Neck:      Musculoskeletal: Normal range of motion and neck supple  Thyroid: No thyromegaly  Cardiovascular:      Rate and Rhythm: Normal rate and regular rhythm  Pulses: Normal pulses  Heart sounds: Murmur present  Pulmonary:      Effort: Pulmonary effort is normal       Breath sounds: Normal breath sounds  Abdominal:      General: Bowel sounds are normal  There is no distension  Palpations: Abdomen is soft  Tenderness: There is no abdominal tenderness  Musculoskeletal:      Right lower leg: No edema  Left lower leg: No edema  Lymphadenopathy:      Cervical: No cervical adenopathy  Skin:     General: Skin is warm and dry  Capillary Refill: Capillary refill takes less than 2 seconds  Neurological:      Mental Status: She is alert and oriented to person, place, and time  Gait: Gait normal    Psychiatric:         Mood and Affect: Mood normal          Behavior: Behavior normal          Thought Content:  Thought content normal          Judgment: Judgment normal          Labs:   Lab Results   Component Value Date    HGBA1C 6 6 (H) 03/17/2021    HGBA1C 6 6 (H) 12/09/2020    HGBA1C 6 3 (H) 05/20/2020     Lab Results   Component Value Date    CREATININE 0 75 03/17/2021    CREATININE 0 74 12/09/2020    CREATININE 0 71 05/20/2020    BUN 16 03/17/2021     09/03/2015    K 4 3 03/17/2021     03/17/2021    CO2 31 03/17/2021     eGFR   Date Value Ref Range Status   03/17/2021 83 ml/min/1 73sq m Final     Lab Results   Component Value Date    CHOL 173 04/29/2015    HDL 51 12/09/2020    TRIG 95 12/09/2020     Lab Results   Component Value Date    ALT 40 03/17/2021    AST 26 03/17/2021    GGT 29 06/26/2017    ALKPHOS 135 (H) 03/17/2021    BILITOT 0 90 09/03/2015     Lab Results   Component Value Date    MJE9JZSFUTWI 2 050 03/17/2021    FPO7WLPSLTIZ 2 200 11/28/2018    LZE4SRTKPVTH 2 230 05/31/2017     No results found for: FREET4, TSI    Impression & Plan:    Problem List Items Addressed This Visit        Endocrine    DMII (diabetes mellitus, type 2) ([facility] ) - Primary     Counseled on pathophysiology of type 2 diabetes  While patient is relatively well controlled at 6 6%, she would benefit from metformin therapy  Recommended that she continue to focus on a healthy diet and increase physical activity  Recommended that she have a glucometer and test strips in case of illness or symptoms of hypoglycemia  Patient is agreeable to this plan and will start taking 500 mg of metformin daily with dinner  Advised on side effects including diarrhea and GI upset  Patient will notify myself or her PCP should such symptoms arise  Lab Results   Component Value Date    HGBA1C 6 6 (H) 03/17/2021            Relevant Orders    HEMOGLOBIN A1C W/ EAG ESTIMATION Lab Collect    Hyperparathyroidism ([facility] )       Mild elevation of PTH may be due to vitamin-D deficiency or primary hyperparathyroidism  Patient's calcium levels have trended normal for years  Recent DEXA scan shows healthy bone density  Recommended patient continue to supplement her vitamin-D, continue with dietary calcium intake, and weight-bearing exercises  Continue to monitor serum calcium and bone density every 2 years           Relevant Orders    PTH, intact Lab Collect Lab Collect    Vitamin D 25 hydroxy Lab Collect    Comprehensive metabolic panel Lab Collect       Cardiovascular and Mediastinum Hypertension       BP stable 128/76  Continue current regimen  Orders Placed This Encounter   Procedures    HEMOGLOBIN A1C W/ EAG ESTIMATION Lab Collect     Standing Status:   Future     Standing Expiration Date:   4/22/2022    PTH, intact Lab Collect Lab Collect     Standing Status:   Future     Standing Expiration Date:   4/22/2022    Vitamin D 25 hydroxy Lab Collect     Standing Status:   Future     Standing Expiration Date:   4/22/2022    Comprehensive metabolic panel Lab Collect     This is a patient instruction: Patient fasting for 8 hours or longer recommended  Standing Status:   Future     Standing Expiration Date:   4/22/2022       Discussed with the patient and all questioned fully answered  She will call me if any problems arise  Follow-up appointment in 6 months       Counseled patient on diagnostic results, prognosis, risk and benefit of treatment options, instruction for management, importance of treatment compliance, Risk  factor reduction and impressions    KASHIF Jang

## 2021-04-22 NOTE — ASSESSMENT & PLAN NOTE
Mild elevation of PTH may be due to vitamin-D deficiency or primary hyperparathyroidism  Patient's calcium levels have trended normal for years  Recent DEXA scan shows healthy bone density  Recommended patient continue to supplement her vitamin-D, continue with dietary calcium intake, and weight-bearing exercises  Continue to monitor serum calcium and bone density every 2 years

## 2021-04-22 NOTE — ASSESSMENT & PLAN NOTE
Counseled on pathophysiology of type 2 diabetes  While patient is relatively well controlled at 6 6%, she would benefit from metformin therapy  Recommended that she continue to focus on a healthy diet and increase physical activity  Recommended that she have a glucometer and test strips in case of illness or symptoms of hypoglycemia  Patient is agreeable to this plan and will start taking 500 mg of metformin daily with dinner  Advised on side effects including diarrhea and GI upset  Patient will notify myself or her PCP should such symptoms arise    Lab Results   Component Value Date    HGBA1C 6 6 (H) 03/17/2021

## 2021-04-28 LAB
LAB AP GYN PRIMARY INTERPRETATION: NORMAL
Lab: NORMAL

## 2021-05-17 ENCOUNTER — VBI (OUTPATIENT)
Dept: ADMINISTRATIVE | Facility: OTHER | Age: 66
End: 2021-05-17

## 2021-06-22 ENCOUNTER — APPOINTMENT (OUTPATIENT)
Dept: LAB | Facility: CLINIC | Age: 66
End: 2021-06-22
Payer: MEDICARE

## 2021-06-22 DIAGNOSIS — E11.9 TYPE 2 DIABETES MELLITUS WITHOUT COMPLICATION, WITHOUT LONG-TERM CURRENT USE OF INSULIN (HCC): ICD-10-CM

## 2021-06-22 DIAGNOSIS — E21.3 HYPERPARATHYROIDISM (HCC): ICD-10-CM

## 2021-06-22 LAB
25(OH)D3 SERPL-MCNC: 38.9 NG/ML (ref 30–100)
ALBUMIN SERPL BCP-MCNC: 3.3 G/DL (ref 3.5–5)
ALP SERPL-CCNC: 123 U/L (ref 46–116)
ALT SERPL W P-5'-P-CCNC: 48 U/L (ref 12–78)
ANION GAP SERPL CALCULATED.3IONS-SCNC: 5 MMOL/L (ref 4–13)
AST SERPL W P-5'-P-CCNC: 31 U/L (ref 5–45)
BILIRUB SERPL-MCNC: 0.4 MG/DL (ref 0.2–1)
BUN SERPL-MCNC: 12 MG/DL (ref 5–25)
CALCIUM ALBUM COR SERPL-MCNC: 10 MG/DL (ref 8.3–10.1)
CALCIUM SERPL-MCNC: 9.4 MG/DL (ref 8.3–10.1)
CHLORIDE SERPL-SCNC: 110 MMOL/L (ref 100–108)
CO2 SERPL-SCNC: 26 MMOL/L (ref 21–32)
CREAT SERPL-MCNC: 0.65 MG/DL (ref 0.6–1.3)
EST. AVERAGE GLUCOSE BLD GHB EST-MCNC: 146 MG/DL
GFR SERPL CREATININE-BSD FRML MDRD: 93 ML/MIN/1.73SQ M
GLUCOSE P FAST SERPL-MCNC: 129 MG/DL (ref 65–99)
HBA1C MFR BLD: 6.7 %
POTASSIUM SERPL-SCNC: 3.7 MMOL/L (ref 3.5–5.3)
PROT SERPL-MCNC: 7.2 G/DL (ref 6.4–8.2)
PTH-INTACT SERPL-MCNC: 75.2 PG/ML (ref 18.4–80.1)
SODIUM SERPL-SCNC: 141 MMOL/L (ref 136–145)

## 2021-06-22 PROCEDURE — 36415 COLL VENOUS BLD VENIPUNCTURE: CPT

## 2021-06-22 PROCEDURE — 83036 HEMOGLOBIN GLYCOSYLATED A1C: CPT

## 2021-06-22 PROCEDURE — 80053 COMPREHEN METABOLIC PANEL: CPT

## 2021-06-22 PROCEDURE — 82306 VITAMIN D 25 HYDROXY: CPT

## 2021-06-22 PROCEDURE — 83970 ASSAY OF PARATHORMONE: CPT

## 2021-06-25 ENCOUNTER — OFFICE VISIT (OUTPATIENT)
Dept: FAMILY MEDICINE CLINIC | Facility: CLINIC | Age: 66
End: 2021-06-25
Payer: MEDICARE

## 2021-06-25 VITALS
SYSTOLIC BLOOD PRESSURE: 120 MMHG | TEMPERATURE: 98.2 F | BODY MASS INDEX: 33.49 KG/M2 | WEIGHT: 201 LBS | OXYGEN SATURATION: 96 % | HEART RATE: 69 BPM | HEIGHT: 65 IN | DIASTOLIC BLOOD PRESSURE: 78 MMHG

## 2021-06-25 DIAGNOSIS — E11.9 TYPE 2 DIABETES MELLITUS WITHOUT COMPLICATION, WITHOUT LONG-TERM CURRENT USE OF INSULIN (HCC): Primary | ICD-10-CM

## 2021-06-25 DIAGNOSIS — R42 LIGHTHEADED: ICD-10-CM

## 2021-06-25 DIAGNOSIS — I10 ESSENTIAL HYPERTENSION: ICD-10-CM

## 2021-06-25 DIAGNOSIS — E55.9 VITAMIN D INSUFFICIENCY: ICD-10-CM

## 2021-06-25 DIAGNOSIS — R15.9 INCONTINENCE OF FECES WITH FECAL URGENCY: ICD-10-CM

## 2021-06-25 DIAGNOSIS — T88.7XXA SIDE EFFECT OF MEDICATION: ICD-10-CM

## 2021-06-25 DIAGNOSIS — R15.2 INCONTINENCE OF FECES WITH FECAL URGENCY: ICD-10-CM

## 2021-06-25 PROCEDURE — 99214 OFFICE O/P EST MOD 30 MIN: CPT | Performed by: PHYSICIAN ASSISTANT

## 2021-06-25 RX ORDER — OMEGA-3S/DHA/EPA/FISH OIL/D3 300MG-1000
400 CAPSULE ORAL DAILY
COMMUNITY

## 2021-06-25 NOTE — PROGRESS NOTES
Patient's shoes and socks removed  Right Foot/Ankle   Right Foot Inspection  Skin Exam: skin normal and skin intact no dry skin, no warmth, no callus, no erythema, no maceration, no abnormal color, no pre-ulcer, no ulcer and no callus                          Toe Exam: right toe deformity  Sensory       Monofilament testing: diminished  Vascular    The right DP pulse is 2+  The right PT pulse is 2+  Left Foot/Ankle  Left Foot Inspection  Skin Exam: skin normal and skin intactno dry skin, no warmth, no erythema, no maceration, normal color, no pre-ulcer, no ulcer and no callus                         Toe Exam: left toe deformity (hammertone b/l 2nd toe)                   Sensory       Monofilament: intact  Vascular    The left DP pulse is 2+  The left PT pulse is 2+  Assign Risk Category:  Deformity present; Loss of protective sensation; No weak pulses       Risk: 2      Routine Follow-up    Ana Paula Sky 77 y o  female   Date:  6/25/2021      Assessment and Plan:    Eric Wayne was seen today for follow-up  Diagnoses and all orders for this visit:    Type 2 diabetes mellitus without complication, without long-term current use of insulin (HCC)  -     sitaGLIPtin (JANUVIA) 25 mg tablet; Take 1 tablet (25 mg total) by mouth daily  - stop metformin  - continue diabetic diet     Incontinence of feces with fecal urgency  - if does not resolve with stopping metformin, needs further w/u    Side effect of medication  - GI s/e from metformin    Vitamin D insufficiency  - improved with current supplementation and pth normalized      Essential hypertension  - stable    Lightheaded  - encourage BG monitoring  - BP stable  - adequate hydration         HPI:  Chief Complaint   Patient presents with    Follow-up     3 month check  Patient states that when she has to have a bowel movement its sudden and urgent  HPI   Patient is a 78 yo female who presents for routine follow up    Since last appt, patient has followed up with endocrinology and since taking vit d, pth has normalized and vit d improved  She is having fecal urgency and incontinence if does not get to bathroom soon enough  This started after starting the metformin in April  The metformin does not make her stomach feel right  She is trying to be mindful of carb intake  She woke up feeling a little lightheaded at times but has not been checking her BG at all  Her BP is stable  She feels well on zoloft, denies need for change  ROS: Review of Systems   Constitutional: Negative for appetite change, diaphoresis and fever  Respiratory: Negative  Cardiovascular: Negative  Gastrointestinal: Positive for diarrhea (and incontinence)  Negative for abdominal pain and vomiting  Stomach upset     Genitourinary: Negative  Skin: Negative  Neurological: Positive for light-headedness  Negative for dizziness, syncope and weakness  Psychiatric/Behavioral: Dysphoric mood: stable  Nervous/anxious: stable          Past Medical History:   Diagnosis Date    Anxiety     Carcinoma ([facility] )     carcinoma of the skin    Chronic pain disorder     Right ankle fracture    Depression     Diabetes mellitus (HCC)     Type 2    Diverticulosis     GERD (gastroesophageal reflux disease)     Heart murmur     mitral valve insufficiency    Hypertension     Tenosynovitis, de Quervain     Vertigo     last assessed 8/7/2012     Patient Active Problem List   Diagnosis    Hypertension    [facility] ([facility] )    Annual physical exam    Alkaline phosphatase elevation    Chronic pain of right ankle    De Quervain's tenosynovitis, left    Diverticulosis    DMII [facility]diabetes mellitus, type 2) ([facility])    Elevated bilirubin    Esophageal reflux    Fracture of right heel    Left ventricular hypertrophy    Seasonal allergic rhinitis    Secondary mitral valve insufficiency    Uterovaginal prolapse    History of cholecystectomy    Onychomycosis    Hyperparathyroidism [facility])       Past Surgical History:   Procedure Laterality Date    CHOLECYSTECTOMY      COLONOSCOPY      COLPOSCOPY W/ BIOPSY / CURETTAGE      cervix  last assessed 8/11/1998    ENDOMETRIAL BIOPSY      onset 4/13/2011    FOOT SURGERY      R foot and ankle fracture, fused with screws    HYSTEROSCOPY W/ POLYPECTOMY      NC COLONOSCOPY FLX DX W/COLLJ SPEC WHEN PFRMD N/A 6/20/2017    Procedure: COLONOSCOPY;  Surgeon: Jessica Fuentes MD;  Location: [facility]; Service: General    TONSILLECTOMY         Social History     Socioeconomic History    Marital status:      Spouse name: None    Number of children: None    Years of education: None    Highest education level: None   Occupational History    None   Tobacco Use    Smoking status: Passive Smoke Exposure - Never Smoker    Smokeless tobacco: Never Used    Tobacco comment: as per [employer] second hand  smoke exosure    quit june 2016   Vaping Use    Vaping Use: Never used   Substance and Sexual Activity    Alcohol use: No    Drug use: No    Sexual activity: Not Currently     Partners: Male   Other Topics Concern    None   Social History Narrative    Caffeine Use    Uses safety equipment- seatbelts     Social Determinants of Health     Financial Resource Strain:     Difficulty of Paying Living Expenses:    Food Insecurity:     Worried About Running Out of Food in the Last Year:     [address] in the Last Year:    Transportation Needs: Unmet Transportation Needs    Lack of Transportation (Medical):  Yes    Lack of Transportation (Non-Medical): Yes   Physical Activity:     Days of Exercise per Week:     Minutes of Exercise per Session:    Stress:     Feeling of Stress :    Social Connections:     Frequency of Communication with Friends and Family:     Frequency of Social Gatherings with Friends and Family:     Attends Denominational Services:     Active Member of Clubs or Organizations:     Attends Club or Organization Meetings:     Marital Status:    Intimate Partner Violence:     Fear of Current or Ex-Partner:     Emotionally Abused:     Physically Abused:     Sexually Abused:        Family History   Problem Relation Age of Onset   Roseann Morgan Breast cancer Mother         Breast surgery mastectomy    Dementia Mother     Colon cancer Father     Liver cancer Father        No Known Allergies      Current Outpatient Medications:     Bimatoprost (LUMIGAN OP), Apply 1 drop to eye daily at bedtime, Disp: , Rfl:     cetirizine (ZyrTEC) 10 mg tablet, Take 10 mg by mouth daily, Disp: , Rfl:     cholecalciferol (VITAMIN D3) 400 units tablet, Take 400 Units by mouth daily, Disp: , Rfl:     cyanocobalamin (VITAMIN B-12) 100 mcg tablet, Take by mouth daily, Disp: , Rfl:     diclofenac sodium (VOLTAREN) 1 %, Place on the skin daily, Disp: , Rfl:     famotidine (PEPCID) 20 mg tablet, Take 20 mg by mouth daily as needed , Disp: , Rfl:     fluticasone (FLONASE) 50 mcg/act nasal spray, Use 2 spray(s) in each nostril once daily, Disp: 16 g, Rfl: 1    hydrochlorothiazide (HYDRODIURIL) 25 mg tablet, Take 1 tablet by mouth once daily, Disp: 90 tablet, Rfl: 1    losartan (COZAAR) 100 MG tablet, Take 1 tablet by mouth once daily, Disp: 90 tablet, Rfl: 1    Multiple Vitamins-Minerals (MULTIVITAMIN ADULT PO), Take by mouth, Disp: , Rfl:     sertraline (ZOLOFT) 50 mg tablet, Take 1 tablet by mouth once daily, Disp: 90 tablet, Rfl: 1    verapamil (CALAN-SR) 120 mg CR tablet, TAKE 1 TABLET BY MOUTH ONCE DAILY AT BEDTIME, Disp: 90 tablet, Rfl: 1    sitaGLIPtin (JANUVIA) 25 mg tablet, Take 1 tablet (25 mg total) by mouth daily, Disp: 30 tablet, Rfl: 1      Physical Exam:  /78 (BP Location: Left arm, Patient Position: Sitting, Cuff Size: Large)   Pulse 69   Temp 98 2 °F (36 8 °C) (Tympanic)   Ht 5' 5" (1 651 m)   Wt 91 2 kg (201 lb)   LMP  (LMP Unknown)   SpO2 96%   BMI 33 45 kg/m²     Physical Exam  Constitutional:       General: She is not in acute distress  Appearance: Normal appearance  She is obese  HENT:      Head: Normocephalic and atraumatic  Eyes:      Conjunctiva/sclera: Conjunctivae normal    Cardiovascular:      Rate and Rhythm: Normal rate and regular rhythm  Pulses: no weak pulses          Dorsalis pedis pulses are 2+ on the right side and 2+ on the left side  Posterior tibial pulses are 2+ on the right side and 2+ on the left side  Heart sounds: No murmur heard  Pulmonary:      Effort: Pulmonary effort is normal  No respiratory distress  Breath sounds: Normal breath sounds  No wheezing  Musculoskeletal:      Right lower leg: No edema  Left lower leg: No edema  Feet:    Feet:      Right foot:      Skin integrity: No ulcer, skin breakdown, erythema, warmth, callus or dry skin  Left foot:      Skin integrity: No ulcer, skin breakdown, erythema, warmth, callus or dry skin  Skin:     General: Skin is warm and dry  Coloration: Skin is not pale  Neurological:      General: No focal deficit present  Mental Status: She is alert and oriented to person, place, and time     Psychiatric:         Mood and Affect: Mood normal          Behavior: Behavior normal            Labs:  Lab Results   Component Value Date    WBC 5 32 11/28/2018    HGB 13 8 11/28/2018    HCT 43 1 11/28/2018    MCV 97 11/28/2018     11/28/2018     Lab Results   Component Value Date     09/03/2015    K 3 7 06/22/2021     (H) 06/22/2021    CO2 26 06/22/2021    ANIONGAP 7 09/03/2015    BUN 12 06/22/2021    CREATININE 0 65 06/22/2021    GLUCOSE 110 09/03/2015    GLUF 129 (H) 06/22/2021    CALCIUM 9 4 06/22/2021    CORRECTEDCA 10 0 06/22/2021    AST 31 06/22/2021    ALT 48 06/22/2021    ALKPHOS 123 (H) 06/22/2021    PROT 7 4 09/03/2015    BILITOT 0 90 09/03/2015    EGFR 93 06/22/2021

## 2021-08-25 DIAGNOSIS — F32.A DEPRESSION, UNSPECIFIED DEPRESSION TYPE: ICD-10-CM

## 2021-09-27 ENCOUNTER — OFFICE VISIT (OUTPATIENT)
Dept: FAMILY MEDICINE CLINIC | Facility: CLINIC | Age: 66
End: 2021-09-27
Payer: MEDICARE

## 2021-09-27 ENCOUNTER — TELEPHONE (OUTPATIENT)
Dept: FAMILY MEDICINE CLINIC | Facility: CLINIC | Age: 66
End: 2021-09-27

## 2021-09-27 VITALS
RESPIRATION RATE: 18 BRPM | HEIGHT: 65 IN | OXYGEN SATURATION: 98 % | SYSTOLIC BLOOD PRESSURE: 132 MMHG | WEIGHT: 205 LBS | TEMPERATURE: 98.3 F | DIASTOLIC BLOOD PRESSURE: 84 MMHG | BODY MASS INDEX: 34.16 KG/M2 | HEART RATE: 72 BPM

## 2021-09-27 DIAGNOSIS — E11.9 TYPE 2 DIABETES MELLITUS WITHOUT COMPLICATION, WITHOUT LONG-TERM CURRENT USE OF INSULIN (HCC): Primary | ICD-10-CM

## 2021-09-27 DIAGNOSIS — I10 ESSENTIAL HYPERTENSION: ICD-10-CM

## 2021-09-27 LAB — SL AMB POCT HEMOGLOBIN AIC: 6.5 (ref ?–6.5)

## 2021-09-27 PROCEDURE — 83036 HEMOGLOBIN GLYCOSYLATED A1C: CPT | Performed by: PHYSICIAN ASSISTANT

## 2021-09-27 PROCEDURE — 99213 OFFICE O/P EST LOW 20 MIN: CPT | Performed by: PHYSICIAN ASSISTANT

## 2021-09-27 NOTE — TELEPHONE ENCOUNTER
Pt called in to tell dejah that she meds the Januvia 50 mg called [facility], Myra Powell wants to much for it  vicki's # is [phone]

## 2021-09-27 NOTE — PROGRESS NOTES
Routine Follow-up    Maddie Gomes 77 y o  female   Date:  9/27/2021      Assessment and Plan:    Leotis Kussmaul was seen today for follow-up and medication management  Diagnoses and all orders for this visit:    Type 2 diabetes mellitus without complication, without long-term current use of insulin (HCC)  -     POCT hemoglobin A1c - 6 5  -     sitaGLIPtin (JANUVIA) 50 mg tablet; Take 1 tablet (50 mg total) by mouth daily  -     Lipid panel; Future  -     Hemoglobin A1C; Future  -     Comprehensive metabolic panel; Future  -     Microalbumin / creatinine urine ratio; Future  - will increase janvuia to 50 mg, continue close monitoring on home BG   - diabetic diet     Essential hypertension  -     verapamil (CALAN-SR) 120 mg CR tablet; Take 1 tablet (120 mg total) by mouth daily at bedtime  -     Comprehensive metabolic panel; Future  - stable, no change made today            HPI:  Chief Complaint   Patient presents with    Follow-up     Has mammogram script, will be scheduling     Medication Management     Discuss Januvia      HPI   Patient is a 78 yo female who presents for routine 3 month follow up  Her home BG were 120-140s but has jumped to high 100s and low 200s a few times, feels it was meal related  Her BG in last 30 days - averages 145  She forgot to till her janvuia x 2 weeks  She is getting to go back to exercise class this month  She is frustrated about with weight gain, gained 4 lbs compared to last visit  Her GI symptoms have resolved since stopping metformin  She feels well on zoloft  She likes to keep busy and out of the house  Her Bp is stable, today was elevated slightly from her norm, just came from exercise class  ROS: Review of Systems   Constitutional: Negative  Unexpected weight change: 4 lb wt gain  Respiratory: Negative  Cardiovascular: Negative  Neurological: Negative  Psychiatric/Behavioral: Dysphoric mood: grief, stable         Past Medical History:   Diagnosis Date    Anxiety     Carcinoma ([facility] )     carcinoma of the skin    Chronic pain disorder     Right ankle fracture    Depression     Diabetes mellitus (HCC)     Type 2    Diverticulosis     GERD (gastroesophageal reflux disease)     Heart murmur     mitral valve insufficiency    Hypertension     Tenosynovitis, de Quervain     Vertigo     last assessed 8/7/2012     Patient Active Problem List   Diagnosis    Hypertension    Depression, recurrent ([facility] )    Annual physical exam    Alkaline phosphatase elevation    Chronic pain of right ankle    De Quervain's tenosynovitis, left    Diverticulosis    DMII (diabetes mellitus, type 2) (HCC)    Elevated bilirubin    Esophageal reflux    Fracture of right heel    Left ventricular hypertrophy    Seasonal allergic rhinitis    Secondary mitral valve insufficiency    Uterovaginal prolapse    History of cholecystectomy    Onychomycosis    Hyperparathyroidism ([facility] )       Past Surgical History:   Procedure Laterality Date    CHOLECYSTECTOMY      COLONOSCOPY      COLPOSCOPY W/ BIOPSY / CURETTAGE      cervix  last assessed 8/11/1998    ENDOMETRIAL BIOPSY      onset 4/13/2011    FOOT SURGERY      R foot and ankle fracture, fused with screws    HYSTEROSCOPY W/ POLYPECTOMY      [place] COLONOSCOPY FLX DX W/COLLJ SPEC WHEN PFRMD N/A 6/20/2017    Procedure: COLONOSCOPY;  Surgeon: Charlene Lopez MD;  Location: [facility]; Service: General    TONSILLECTOMY         Social History     Socioeconomic History    Marital status:       Spouse name: None    Number of children: None    Years of education: None    Highest education level: None   Occupational History    None   Tobacco Use    Smoking status: Passive Smoke Exposure - Never Smoker    Smokeless tobacco: Never Used    Tobacco comment: as per [employer] second hand  smoke exosure    quit june 2016   Vaping Use    Vaping Use: Never used   Substance and Sexual Activity    Alcohol use: No    Drug use: No    Sexual activity: Not Currently     Partners: Male   Other Topics Concern    None   Social History Narrative    Caffeine Use    Uses safety equipment- seatbelts     Social Determinants of Health     Financial Resource Strain:     Difficulty of Paying Living Expenses:    Food Insecurity:     Worried About Running Out of Food in the Last Year:     Ran Out of Food in the Last Year:    Transportation Needs: Unmet Transportation Needs    Lack of Transportation (Medical):  Yes    Lack of Transportation (Non-Medical): Yes   Physical Activity:     Days of Exercise per Week:     Minutes of Exercise per Session:    Stress:     Feeling of Stress :    Social Connections:     Frequency of Communication with Friends and Family:     Frequency of Social Gatherings with Friends and Family:     Attends [employer] Services:     Active Member of Clubs or Organizations:     Attends Club or Organization Meetings:     Marital Status:    Intimate Partner Violence:     Fear of Current or Ex-Partner:     Emotionally Abused:     Physically Abused:     Sexually Abused:        Family History   Problem Relation Age of Onset    Breast cancer Mother         Breast surgery mastectomy    Dementia Mother     Colon cancer Father     Liver cancer Father        No Known Allergies      Current Outpatient Medications:     Bimatoprost (LUMIGAN OP), Apply 1 drop to eye daily at bedtime, Disp: , Rfl:     cetirizine (ZyrTEC) 10 mg tablet, Take 10 mg by mouth daily, Disp: , Rfl:     cholecalciferol (VITAMIN D3) 400 units tablet, Take 400 Units by mouth daily, Disp: , Rfl:     cyanocobalamin (VITAMIN B-12) 100 mcg tablet, Take by mouth daily, Disp: , Rfl:     diclofenac sodium (VOLTAREN) 1 %, Place on the skin daily, Disp: , Rfl:     famotidine (PEPCID) 20 mg tablet, Take 20 mg by mouth daily as needed , Disp: , Rfl:     fluticasone (FLONASE) 50 mcg/act nasal spray, Use 2 spray(s) in each nostril once daily, Disp: 16 g, Rfl: 1    hydrochlorothiazide (HYDRODIURIL) 25 mg tablet, Take 1 tablet by mouth once daily, Disp: 90 tablet, Rfl: 1    losartan (COZAAR) 100 MG tablet, Take 1 tablet by mouth once daily, Disp: 90 tablet, Rfl: 1    Multiple Vitamins-Minerals (MULTIVITAMIN ADULT PO), Take by mouth, Disp: , Rfl:     sertraline (ZOLOFT) 50 mg tablet, Take 1 tablet by mouth once daily, Disp: 90 tablet, Rfl: 0    sitaGLIPtin (JANUVIA) 50 mg tablet, Take 1 tablet (50 mg total) by mouth daily, Disp: 90 tablet, Rfl: 1    verapamil (CALAN-SR) 120 mg CR tablet, Take 1 tablet (120 mg total) by mouth daily at bedtime, Disp: 90 tablet, Rfl: 1      Physical Exam:  /84   Pulse 72   Temp 98 3 °F (36 8 °C)   Resp 18   Ht 5' 5" (1 651 m)   Wt 93 kg (205 lb)   LMP  (LMP Unknown)   SpO2 98%   BMI 34 11 kg/m²     Physical Exam  Constitutional:       General: She is not in acute distress  Appearance: Normal appearance  HENT:      Head: Normocephalic and atraumatic  Right Ear: External ear normal       Left Ear: External ear normal    Eyes:      Conjunctiva/sclera: Conjunctivae normal       Pupils: Pupils are equal, round, and reactive to light  Cardiovascular:      Rate and Rhythm: Normal rate and regular rhythm  Pulmonary:      Effort: Pulmonary effort is normal  No respiratory distress  Skin:     General: Skin is warm and dry  Neurological:      General: No focal deficit present  Mental Status: She is alert and oriented to person, place, and time     Psychiatric:         Mood and Affect: Mood normal          Behavior: Behavior normal            Labs:  Lab Results   Component Value Date    WBC 5 32 11/28/2018    HGB 13 8 11/28/2018    HCT 43 1 11/28/2018    MCV 97 11/28/2018     11/28/2018     Lab Results   Component Value Date     09/03/2015    K 3 7 06/22/2021     (H) 06/22/2021    CO2 26 06/22/2021    ANIONGAP 7 09/03/2015    BUN 12 06/22/2021    CREATININE 0 65 06/22/2021    GLUCOSE 110 09/03/2015    GLUF 129 (H) 06/22/2021    CALCIUM 9 4 06/22/2021    CORRECTEDCA 10 0 06/22/2021    AST 31 06/22/2021    ALT 48 06/22/2021    ALKPHOS 123 (H) 06/22/2021    PROT 7 4 09/03/2015    BILITOT 0 90 09/03/2015    EGFR 93 06/22/2021

## 2021-09-30 DIAGNOSIS — E11.9 TYPE 2 DIABETES MELLITUS WITHOUT COMPLICATION, WITHOUT LONG-TERM CURRENT USE OF INSULIN (HCC): ICD-10-CM

## 2021-09-30 NOTE — TELEPHONE ENCOUNTER
Patient states she received an email from [address] [place] [address] to state that the Januvia cannot be filled and to contact PCP office  Informed patient that we did not receive notice on why they will not fill prescription  Patient called [facility] back and was told they had an error on her account and had her marked   A new prescription is needed to be sent for them to place in on the file and send to patient

## 2021-10-12 ENCOUNTER — VBI (OUTPATIENT)
Dept: ADMINISTRATIVE | Facility: OTHER | Age: 66
End: 2021-10-12

## 2021-11-03 ENCOUNTER — TELEPHONE (OUTPATIENT)
Dept: FAMILY MEDICINE CLINIC | Facility: CLINIC | Age: 66
End: 2021-11-03

## 2021-11-04 DIAGNOSIS — R92.8 OTHER ABNORMAL AND INCONCLUSIVE FINDINGS ON DIAGNOSTIC IMAGING OF BREAST: ICD-10-CM

## 2021-11-04 DIAGNOSIS — N63.10 BREAST MASS, RIGHT: Primary | ICD-10-CM

## 2021-11-26 ENCOUNTER — TELEMEDICINE (OUTPATIENT)
Dept: FAMILY MEDICINE CLINIC | Facility: CLINIC | Age: 66
End: 2021-11-26
Payer: MEDICARE

## 2021-11-26 DIAGNOSIS — B34.9 VIRAL INFECTION, UNSPECIFIED: Primary | ICD-10-CM

## 2021-11-26 PROCEDURE — U0003 INFECTIOUS AGENT DETECTION BY NUCLEIC ACID (DNA OR RNA); SEVERE ACUTE RESPIRATORY SYNDROME CORONAVIRUS 2 (SARS-COV-2) (CORONAVIRUS DISEASE [COVID-19]), AMPLIFIED PROBE TECHNIQUE, MAKING USE OF HIGH THROUGHPUT TECHNOLOGIES AS DESCRIBED BY CMS-2020-01-R: HCPCS | Performed by: FAMILY MEDICINE

## 2021-11-26 PROCEDURE — 99213 OFFICE O/P EST LOW 20 MIN: CPT | Performed by: FAMILY MEDICINE

## 2021-11-26 PROCEDURE — U0005 INFEC AGEN DETEC AMPLI PROBE: HCPCS | Performed by: FAMILY MEDICINE

## 2022-01-11 DIAGNOSIS — J30.1 ALLERGIC RHINITIS DUE TO POLLEN, UNSPECIFIED SEASONALITY: ICD-10-CM

## 2022-01-11 RX ORDER — FLUTICASONE PROPIONATE 50 MCG
2 SPRAY, SUSPENSION (ML) NASAL DAILY
Qty: 16 G | Refills: 1 | Status: SHIPPED | OUTPATIENT
Start: 2022-01-11 | End: 2022-03-08 | Stop reason: SDUPTHER

## 2022-01-27 ENCOUNTER — VBI (OUTPATIENT)
Dept: ADMINISTRATIVE | Facility: OTHER | Age: 67
End: 2022-01-27

## 2022-02-02 DIAGNOSIS — I10 ESSENTIAL HYPERTENSION: ICD-10-CM

## 2022-02-02 RX ORDER — LOSARTAN POTASSIUM 100 MG/1
TABLET ORAL
Qty: 90 TABLET | Refills: 0 | Status: SHIPPED | OUTPATIENT
Start: 2022-02-02 | End: 2022-05-02

## 2022-02-02 RX ORDER — HYDROCHLOROTHIAZIDE 25 MG/1
TABLET ORAL
Qty: 90 TABLET | Refills: 0 | Status: SHIPPED | OUTPATIENT
Start: 2022-02-02 | End: 2022-05-02

## 2022-02-08 ENCOUNTER — VBI (OUTPATIENT)
Dept: ADMINISTRATIVE | Facility: OTHER | Age: 67
End: 2022-02-08

## 2022-03-08 DIAGNOSIS — J30.1 ALLERGIC RHINITIS DUE TO POLLEN, UNSPECIFIED SEASONALITY: ICD-10-CM

## 2022-03-08 RX ORDER — FLUTICASONE PROPIONATE 50 MCG
2 SPRAY, SUSPENSION (ML) NASAL DAILY
Qty: 16 G | Refills: 1 | Status: SHIPPED | OUTPATIENT
Start: 2022-03-08 | End: 2022-03-09 | Stop reason: SDUPTHER

## 2022-03-09 RX ORDER — FLUTICASONE PROPIONATE 50 MCG
2 SPRAY, SUSPENSION (ML) NASAL DAILY
Qty: 48 G | Refills: 1 | Status: SHIPPED | OUTPATIENT
Start: 2022-03-09

## 2022-03-09 NOTE — TELEPHONE ENCOUNTER
Patient contacted office stating that the Flonase ordered is only a 30 day supply  Would like a 90 day supply as it is the same price  Medication resent

## 2022-03-21 ENCOUNTER — APPOINTMENT (OUTPATIENT)
Dept: LAB | Facility: CLINIC | Age: 67
End: 2022-03-21
Payer: MEDICARE

## 2022-03-21 DIAGNOSIS — E11.9 TYPE 2 DIABETES MELLITUS WITHOUT COMPLICATION, WITHOUT LONG-TERM CURRENT USE OF INSULIN (HCC): ICD-10-CM

## 2022-03-21 DIAGNOSIS — I10 ESSENTIAL HYPERTENSION: ICD-10-CM

## 2022-03-21 LAB
ALBUMIN SERPL BCP-MCNC: 3.7 G/DL (ref 3.5–5)
ALP SERPL-CCNC: 135 U/L (ref 46–116)
ALT SERPL W P-5'-P-CCNC: 42 U/L (ref 12–78)
ANION GAP SERPL CALCULATED.3IONS-SCNC: 6 MMOL/L (ref 4–13)
AST SERPL W P-5'-P-CCNC: 29 U/L (ref 5–45)
BILIRUB SERPL-MCNC: 0.9 MG/DL (ref 0.2–1)
BUN SERPL-MCNC: 15 MG/DL (ref 5–25)
CALCIUM SERPL-MCNC: 10.1 MG/DL (ref 8.3–10.1)
CHLORIDE SERPL-SCNC: 106 MMOL/L (ref 100–108)
CHOLEST SERPL-MCNC: 150 MG/DL
CO2 SERPL-SCNC: 26 MMOL/L (ref 21–32)
CREAT SERPL-MCNC: 0.8 MG/DL (ref 0.6–1.3)
CREAT UR-MCNC: 162 MG/DL
EST. AVERAGE GLUCOSE BLD GHB EST-MCNC: 143 MG/DL
GFR SERPL CREATININE-BSD FRML MDRD: 76 ML/MIN/1.73SQ M
GLUCOSE P FAST SERPL-MCNC: 133 MG/DL (ref 65–99)
HBA1C MFR BLD: 6.6 %
HDLC SERPL-MCNC: 45 MG/DL
LDLC SERPL CALC-MCNC: 84 MG/DL (ref 0–100)
MICROALBUMIN UR-MCNC: 15.4 MG/L (ref 0–20)
MICROALBUMIN/CREAT 24H UR: 10 MG/G CREATININE (ref 0–30)
NONHDLC SERPL-MCNC: 105 MG/DL
POTASSIUM SERPL-SCNC: 4.1 MMOL/L (ref 3.5–5.3)
PROT SERPL-MCNC: 7.9 G/DL (ref 6.4–8.2)
SODIUM SERPL-SCNC: 138 MMOL/L (ref 136–145)
TRIGL SERPL-MCNC: 106 MG/DL

## 2022-03-21 PROCEDURE — 83036 HEMOGLOBIN GLYCOSYLATED A1C: CPT

## 2022-03-21 PROCEDURE — 80053 COMPREHEN METABOLIC PANEL: CPT

## 2022-03-21 PROCEDURE — 36415 COLL VENOUS BLD VENIPUNCTURE: CPT

## 2022-03-21 PROCEDURE — 82570 ASSAY OF URINE CREATININE: CPT

## 2022-03-21 PROCEDURE — 82043 UR ALBUMIN QUANTITATIVE: CPT

## 2022-03-21 PROCEDURE — 80061 LIPID PANEL: CPT

## 2022-03-30 ENCOUNTER — OFFICE VISIT (OUTPATIENT)
Dept: FAMILY MEDICINE CLINIC | Facility: CLINIC | Age: 67
End: 2022-03-30
Payer: MEDICARE

## 2022-03-30 VITALS
DIASTOLIC BLOOD PRESSURE: 70 MMHG | BODY MASS INDEX: 34.49 KG/M2 | OXYGEN SATURATION: 95 % | WEIGHT: 207 LBS | HEIGHT: 65 IN | TEMPERATURE: 98.4 F | HEART RATE: 63 BPM | SYSTOLIC BLOOD PRESSURE: 104 MMHG

## 2022-03-30 DIAGNOSIS — R74.8 ALKALINE PHOSPHATASE ELEVATION: ICD-10-CM

## 2022-03-30 DIAGNOSIS — Z13.6 ENCOUNTER FOR ABDOMINAL AORTIC ANEURYSM (AAA) SCREENING: ICD-10-CM

## 2022-03-30 DIAGNOSIS — E21.3 HYPERPARATHYROIDISM (HCC): ICD-10-CM

## 2022-03-30 DIAGNOSIS — F33.9 DEPRESSION, RECURRENT (HCC): ICD-10-CM

## 2022-03-30 DIAGNOSIS — Z80.0 FAMILY HISTORY OF COLON CANCER: ICD-10-CM

## 2022-03-30 DIAGNOSIS — R92.8 ABNORMAL MAMMOGRAM OF RIGHT BREAST: ICD-10-CM

## 2022-03-30 DIAGNOSIS — Z00.00 MEDICARE ANNUAL WELLNESS VISIT, SUBSEQUENT: Primary | ICD-10-CM

## 2022-03-30 DIAGNOSIS — E11.9 TYPE 2 DIABETES MELLITUS WITHOUT COMPLICATION, WITHOUT LONG-TERM CURRENT USE OF INSULIN (HCC): ICD-10-CM

## 2022-03-30 PROCEDURE — 99214 OFFICE O/P EST MOD 30 MIN: CPT | Performed by: PHYSICIAN ASSISTANT

## 2022-03-30 PROCEDURE — 1123F ACP DISCUSS/DSCN MKR DOCD: CPT | Performed by: PHYSICIAN ASSISTANT

## 2022-03-30 PROCEDURE — G0438 PPPS, INITIAL VISIT: HCPCS | Performed by: PHYSICIAN ASSISTANT

## 2022-03-30 NOTE — PATIENT INSTRUCTIONS
Medicare Preventive Visit Patient Instructions  Thank you for completing your Welcome to Medicare Visit or Medicare Annual Wellness Visit today  Your next wellness visit will be due in one year (3/31/2023)  The screening/preventive services that you may require over the next 5-10 years are detailed below  Some tests may not apply to you based off risk factors and/or age  Screening tests ordered at today's visit but not completed yet may show as past due  Also, please note that scanned in results may not display below  Preventive Screenings:  Service Recommendations Previous Testing/Comments   Colorectal Cancer Screening  * Colonoscopy    * Fecal Occult Blood Test (FOBT)/Fecal Immunochemical Test (FIT)  * Fecal DNA/Cologuard Test  * Flexible Sigmoidoscopy Age: 54-65 years old   Colonoscopy: every 10 years (may be performed more frequently if at higher risk)  OR  FOBT/FIT: every 1 year  OR  Cologuard: every 3 years  OR  Sigmoidoscopy: every 5 years  Screening may be recommended earlier than age 48 if at higher risk for colorectal cancer  Also, an individualized decision between you and your healthcare provider will decide whether screening between the ages of 74-80 would be appropriate  Colonoscopy: 06/20/2017  FOBT/FIT: Not on file  Cologuard: Not on file  Sigmoidoscopy: Not on file    Screening Current     Breast Cancer Screening Age: 36 years old  Frequency: every 1-2 years  Not required if history of left and right mastectomy Mammogram: 12/01/2021    Screening Current   Cervical Cancer Screening Between the ages of 21-29, pap smear recommended once every 3 years  Between the ages of 33-67, can perform pap smear with HPV co-testing every 5 years     Recommendations may differ for women with a history of total hysterectomy, cervical cancer, or abnormal pap smears in past  Pap Smear: 04/21/2021    Screening Not Indicated   Hepatitis C Screening Once for adults born between 1945 and 1965  More frequently in patients at high risk for Hepatitis C Hep C Antibody: 06/26/2017    Screening Current   Diabetes Screening 1-2 times per year if you're at risk for diabetes or have pre-diabetes Fasting glucose: 133 mg/dL   A1C: 6 6 %    Screening Not Indicated  History Diabetes   Cholesterol Screening Once every 5 years if you don't have a lipid disorder  May order more often based on risk factors  Lipid panel: 03/21/2022    Screening Current     Other Preventive Screenings Covered by Medicare:  1  Abdominal Aortic Aneurysm (AAA) Screening: covered once if your at risk  You're considered to be at risk if you have a family history of AAA  2  Lung Cancer Screening: covers low dose CT scan once per year if you meet all of the following conditions: (1) Age 50-69; (2) No signs or symptoms of lung cancer; (3) Current smoker or have quit smoking within the last 15 years; (4) You have a tobacco smoking history of at least 30 pack years (packs per day multiplied by number of years you smoked); (5) You get a written order from a healthcare provider  3  Glaucoma Screening: covered annually if you're considered high risk: (1) You have diabetes OR (2) Family history of glaucoma OR (3)  aged 48 and older OR (3)  American aged 72 and older  3  Osteoporosis Screening: covered every 2 years if you meet one of the following conditions: (1) You're estrogen deficient and at risk for osteoporosis based off medical history and other findings; (2) Have a vertebral abnormality; (3) On glucocorticoid therapy for more than 3 months; (4) Have primary hyperparathyroidism; (5) On osteoporosis medications and need to assess response to drug therapy  · Last bone density test (DXA Scan): 04/01/2021   5  HIV Screening: covered annually if you're between the age of 15-65  Also covered annually if you are younger than 13 and older than 72 with risk factors for HIV infection   For pregnant patients, it is covered up to 3 times per pregnancy  Immunizations:  Immunization Recommendations   Influenza Vaccine Annual influenza vaccination during flu season is recommended for all persons aged >= 6 months who do not have contraindications   Pneumococcal Vaccine (Prevnar and Pneumovax)  * Prevnar = PCV13  * Pneumovax = PPSV23   Adults 25-60 years old: 1-3 doses may be recommended based on certain risk factors  Adults 72 years old: Prevnar (PCV13) vaccine recommended followed by Pneumovax (PPSV23) vaccine  If already received PPSV23 since turning 65, then PCV13 recommended at least one year after PPSV23 dose  Hepatitis B Vaccine 3 dose series if at intermediate or high risk (ex: diabetes, end stage renal disease, liver disease)   Tetanus (Td) Vaccine - COST NOT COVERED BY MEDICARE PART B Following completion of primary series, a booster dose should be given every 10 years to maintain immunity against tetanus  Td may also be given as tetanus wound prophylaxis  Tdap Vaccine - COST NOT COVERED BY MEDICARE PART B Recommended at least once for all adults  For pregnant patients, recommended with each pregnancy  Shingles Vaccine (Shingrix) - COST NOT COVERED BY MEDICARE PART B  2 shot series recommended in those aged 48 and above     Health Maintenance Due:      Topic Date Due    Colorectal Cancer Screening  06/20/2022    Breast Cancer Screening: Mammogram  12/01/2022    Cervical Cancer Screening  04/21/2024    Hepatitis C Screening  Completed     Immunizations Due:      Topic Date Due    COVID-19 Vaccine (3 - Booster for Mcqueen Sheerer series) 08/20/2021     Advance Directives   What are advance directives? Advance directives are legal documents that state your wishes and plans for medical care  These plans are made ahead of time in case you lose your ability to make decisions for yourself  Advance directives can apply to any medical decision, such as the treatments you want, and if you want to donate organs     What are the types of advance directives? There are many types of advance directives, and each state has rules about how to use them  You may choose a combination of any of the following:  · Living will: This is a written record of the treatment you want  You can also choose which treatments you do not want, which to limit, and which to stop at a certain time  This includes surgery, medicine, IV fluid, and tube feedings  · Durable power of  for healthcare [facility]): This is a written record that states who you want to make healthcare choices for you when you are unable to make them for yourself  This person, called a proxy, is usually a family member or a friend  You may choose more than 1 proxy  · Do not resuscitate (DNR) order:  A DNR order is used in case your heart stops beating or you stop breathing  It is a request not to have certain forms of treatment, such as CPR  A DNR order may be included in other types of advance directives  · Medical directive: This covers the care that you want if you are in a coma, near death, or unable to make decisions for yourself  You can list the treatments you want for each condition  Treatment may include pain medicine, surgery, blood transfusions, dialysis, IV or tube feedings, and a ventilator (breathing machine)  · Values history: This document has questions about your views, beliefs, and how you feel and think about life  This information can help others choose the care that you would choose  Why are advance directives important? An advance directive helps you control your care  Although spoken wishes may be used, it is better to have your wishes written down  Spoken wishes can be misunderstood, or not followed  Treatments may be given even if you do not want them  An advance directive may make it easier for your family to make difficult choices about your care  Urinary Incontinence   Urinary incontinence (UI)  is when you lose control of your bladder   UI develops because your bladder cannot store or empty urine properly  The 3 most common types of UI are stress incontinence, urge incontinence, or both  Medicines:   · May be given to help strengthen your bladder control  Report any side effects of medication to your healthcare provider  Do pelvic muscle exercises often:  Your pelvic muscles help you stop urinating  Squeeze these muscles tight for 5 seconds, then relax for 5 seconds  Gradually work up to squeezing for 10 seconds  Do 3 sets of 15 repetitions a day, or as directed  This will help strengthen your pelvic muscles and improve bladder control  Train your bladder:  Go to the bathroom at set times, such as every 2 hours, even if you do not feel the urge to go  You can also try to hold your urine when you feel the urge to go  For example, hold your urine for 5 minutes when you feel the urge to go  As that becomes easier, hold your urine for 10 minutes  Self-care:   · Keep a UI record  Write down how often you leak urine and how much you leak  Make a note of what you were doing when you leaked urine  · Drink liquids as directed  You may need to limit the amount of liquid you drink to help control your urine leakage  Do not drink any liquid right before you go to bed  Limit or do not have drinks that contain caffeine or alcohol  · Prevent constipation  Eat a variety of high-fiber foods  Good examples are high-fiber cereals, beans, vegetables, and whole-grain breads  Walking is the best way to trigger your intestines to have a bowel movement  · Exercise regularly and maintain a healthy weight  Weight loss and exercise will decrease pressure on your bladder and help you control your leakage  · Use a catheter as directed  to help empty your bladder  A catheter is a tiny, plastic tube that is put into your bladder to drain your urine  · Go to behavior therapy as directed  Behavior therapy may be used to help you learn to control your urge to urinate      Weight Management Why it is important to manage your weight:  Being overweight increases your risk of health conditions such as heart disease, high blood pressure, type 2 diabetes, and certain types of cancer  It can also increase your risk for osteoarthritis, sleep apnea, and other respiratory problems  Aim for a slow, steady weight loss  Even a small amount of weight loss can lower your risk of health problems  How to lose weight safely:  A safe and healthy way to lose weight is to eat fewer calories and get regular exercise  You can lose up about 1 pound a week by decreasing the number of calories you eat by 500 calories each day  Healthy meal plan for weight management:  A healthy meal plan includes a variety of foods, contains fewer calories, and helps you stay healthy  A healthy meal plan includes the following:  · Eat whole-grain foods more often  A healthy meal plan should contain fiber  Fiber is the part of grains, fruits, and vegetables that is not broken down by your body  Whole-grain foods are healthy and provide extra fiber in your diet  Some examples of whole-grain foods are whole-wheat breads and pastas, oatmeal, brown rice, and bulgur  · Eat a variety of vegetables every day  Include dark, leafy greens such as spinach, kale, bailee greens, and mustard greens  Eat yellow and orange vegetables such as carrots, sweet potatoes, and winter squash  · Eat a variety of fruits every day  Choose fresh or canned fruit (canned in its own juice or light syrup) instead of juice  Fruit juice has very little or no fiber  · Eat low-fat dairy foods  Drink fat-free (skim) milk or 1% milk  Eat fat-free yogurt and low-fat cottage cheese  Try low-fat cheeses such as mozzarella and other reduced-fat cheeses  · Choose meat and other protein foods that are low in fat  Choose beans or other legumes such as split peas or lentils  Choose fish, skinless poultry (chicken or turkey), or lean cuts of red meat (beef or pork)   Before you cook meat or poultry, cut off any visible fat  · Use less fat and oil  Try baking foods instead of frying them  Add less fat, such as margarine, sour cream, regular salad dressing and mayonnaise to foods  Eat fewer high-fat foods  Some examples of high-fat foods include french fries, doughnuts, ice cream, and cakes  · Eat fewer sweets  Limit foods and drinks that are high in sugar  This includes candy, cookies, regular soda, and sweetened drinks  Exercise:  Exercise at least 30 minutes per day on most days of the week  Some examples of exercise include walking, biking, dancing, and swimming  You can also fit in more physical activity by taking the stairs instead of the elevator or parking farther away from stores  Ask your healthcare provider about the best exercise plan for you  Alcohol Use and Your Health    Drinking too much can harm your health  Excessive alcohol use leads to about 88,000 death in the [place] each year, and shortens the life of those who diet by almost 30 years  Further, excessive drinking cost the economy $249 billion in 2010  Most excessive drinkers are not alcohol dependent  Excessive alcohol use has immediate effects that increase the risk of many harmful health conditions  These are most often the result of binge drinking  Over time, excessive alcohol use can lead to the development of chronic diseases and other series health problems  What is considered a "drink"? Excessive alcohol use includes:  · Binge Drinking: For women, 4 or more drinks consumed on one occasion  For men, 5 or more drinks consumed on one occasion  · Heavy Drinking: For women, 8 or more drinks per week   For men, 15 or more drinks per week  · Any alcohol used by pregnant women  · Any alcohol used by those under the age of 21 years    If you choose to drink, do so in moderation:  · Do not drink at all if you are under the age of 24, or if you are or may be pregnant, or have health problems that could be made worse by drinking  · For women, up to 1 drink per day  · For men, up to 2 drinks a day    No one should begin drinking or drink more frequently based on potential health benefits    Short-Term Health Risks:  · Injuries: motor vehicle crashes, falls, drownings, burns  · Violence: homicide, suicide, sexual assault, intimate partner violence  · Alcohol poisoning  · Reproductive health: risky sexual behaviors, unintended prengnacy, sexually transmitted diseases, miscarriage, stillbirth, fetal alcohol syndrome    Long-Term Health Risks:  · Chronic diseases: high blood pressure, heart disease, stroke, liver disease, digestive problems  · Cancers: breast, mouth and throat, liver, colon  · Learning and memory problems: dementia, poor school performance  · Mental health: depression, anxiety, insomnia  · Social problems: lost productivity, family problems, unemployment  · Alcohol dependence    For support and more information:  · Substance Abuse and [facility] , [address] [place]  Web Address: https://[employer]/    · Alcoholics Anonymous        Web Address: http://www mckeon info/    https://www cdc gov/alcohol/fact-sheets/alcohol-use htm     © Copyright [employer] 2018 Information is for End User's use only and may not be sold, redistributed or otherwise used for commercial purposes   All illustrations and images included in CareNotes® are the copyrighted property of A [employer] A M , [employer]  or [address] [place] [address]

## 2022-03-30 NOTE — PROGRESS NOTES
Routine Follow-up    Nam Gold 79 y o  female   Date:  3/30/2022      Assessment and Plan:    Destiny Salazar was seen today for medicare wellness visit and follow-up  Diagnoses and all orders for this visit:    Medicare annual wellness visit, subsequent    BMI 34 0-34 9,adult    Abnormal mammogram of right breast  -     Mammo diagnostic right w cad; Future  -     US breast right limited (diagnostic); Future    Depression, recurrent ([facility] [address] )  Comments:  stable, no changes, continue SSRI    Hyperparathyroidism ([facility] )  Comments:  calcium stable    Orders:  -     Comprehensive metabolic panel; Future  -     PTH, intact; Future    Type 2 diabetes mellitus without complication, without long-term current use of insulin (HCC)  Comments:  well controlled, a1c 6 6  Orders:  -     Hemoglobin A1C; Future  -     Comprehensive metabolic panel; Future    Family history of colon cancer  Comments:  due for 5 year repeat in June  Orders:  -     Ambulatory Referral to General Surgery; Future    Encounter for abdominal aortic aneurysm (AAA) screening  -     US abdominal aorta screening aaa; Future    Alkaline phosphatase elevation  Comments:  Chronic, mild and does not worsen for years; negative GGT; normal LFTs; likely due to hx of bony fx/arthritis       BMI Counseling: Body mass index is 34 45 kg/m²  The BMI is above normal  Nutrition recommendations include limiting drinks that contain sugar and moderation in carbohydrate intake  Exercise recommendations include exercising 3-5 times per week  Rationale for BMI follow-up plan is due to patient being overweight or obese  Tobacco Cessation Counseling: Tobacco cessation counseling was provided  Patient is not a smoker, has 2nd hand smoke exposure hx  HPI:  Chief Complaint   Patient presents with    Medicare Wellness Visit    Follow-up     HPI   Patient is a 80 yo female who presents for AWV and follow up  She is doing well, no concerns  Her BP is stable   Her labs are stable and reviewed today  She has scheduled repeat 6 month follow up with [facility] breast services, needs scripts  ROS: Review of Systems   Constitutional: Negative  Respiratory: Negative  Cardiovascular: Negative  Gastrointestinal: Negative  Genitourinary: Negative  Neurological: Negative  Psychiatric/Behavioral:        Stable         Past Medical History:   Diagnosis Date    Anxiety     Carcinoma ([facility] )     carcinoma of the skin    Chronic pain disorder     Right ankle fracture    Depression     Diabetes mellitus (HCC)     Type 2    Diverticulosis     GERD (gastroesophageal reflux disease)     Heart murmur     mitral valve insufficiency    Hypertension     Tenosynovitis, de Quervain     Vertigo     last assessed 8/7/2012     Patient Active Problem List   Diagnosis    Hypertension    Depression, recurrent ([facility] )    Annual physical exam    Alkaline phosphatase elevation    Chronic pain of right ankle    De Quervain's tenosynovitis, left    Diverticulosis    DMII (diabetes mellitus, type 2) ([facility])    Elevated bilirubin    Esophageal reflux    Fracture of right heel    Left ventricular hypertrophy    Seasonal allergic rhinitis    Secondary mitral valve insufficiency    Uterovaginal prolapse    History of cholecystectomy    Onychomycosis    Hyperparathyroidism ([facility] )       Past Surgical History:   Procedure Laterality Date    CHOLECYSTECTOMY      COLONOSCOPY      COLPOSCOPY W/ BIOPSY / CURETTAGE      cervix  last assessed 8/11/1998    ENDOMETRIAL BIOPSY      onset 4/13/2011    FOOT SURGERY      R foot and ankle fracture, fused with screws    HYSTEROSCOPY W/ POLYPECTOMY      [place] COLONOSCOPY FLX DX W/COLLJ SPEC WHEN PFRMD N/A 6/20/2017    Procedure: COLONOSCOPY;  Surgeon: Shavon Pichardo MD;  Location: [facility]; Service: General    TONSILLECTOMY         Social History     Socioeconomic History    Marital status:       Spouse name: None    Number of children: None    Years of education: None    Highest education level: None   Occupational History    None   Tobacco Use    Smoking status: Passive Smoke Exposure - Never Smoker    Smokeless tobacco: Never Used    Tobacco comment: as per [employer]scripts second hand  smoke exosure    quit june 2016   Vaping Use    Vaping Use: Never used   Substance and Sexual Activity    Alcohol use: No    Drug use: No    Sexual activity: Not Currently     Partners: Male   Other Topics Concern    None   Social History Narrative    Caffeine Use    Uses safety equipment- seatbelts     Social Determinants of Health     Financial Resource Strain: Not on file   Food Insecurity: Not on file   Transportation Needs: Not on file   Physical Activity: Not on file   Stress: Not on file   Social Connections: Not on file   Intimate Partner Violence: Not on file   Housing Stability: Not on file       Family History   Problem Relation Age of Onset    Breast cancer Mother         Breast surgery mastectomy    Dementia Mother     Colon cancer Father     Liver cancer Father        No Known Allergies      Current Outpatient Medications:     Bimatoprost (LUMIGAN OP), Apply 1 drop to eye daily at bedtime, Disp: , Rfl:     cetirizine (ZyrTEC) 10 mg tablet, Take 10 mg by mouth daily, Disp: , Rfl:     cholecalciferol (VITAMIN D3) 400 units tablet, Take 400 Units by mouth daily, Disp: , Rfl:     cyanocobalamin (VITAMIN B-12) 100 mcg tablet, Take by mouth daily, Disp: , Rfl:     diclofenac sodium (VOLTAREN) 1 %, Place on the skin daily, Disp: , Rfl:     famotidine (PEPCID) 20 mg tablet, Take 20 mg by mouth daily as needed , Disp: , Rfl:     fluticasone (FLONASE) 50 mcg/act nasal spray, 2 sprays into each nostril daily, Disp: 48 g, Rfl: 1    hydrochlorothiazide (HYDRODIURIL) 25 mg tablet, Take 1 tablet by mouth once daily, Disp: 90 tablet, Rfl: 0    losartan (COZAAR) 100 MG tablet, Take 1 tablet by mouth once daily, Disp: 90 tablet, Rfl: 0    Multiple Vitamins-Minerals (MULTIVITAMIN ADULT PO), Take by mouth, Disp: , Rfl:     sertraline (ZOLOFT) 50 mg tablet, Take 1 tablet by mouth once daily, Disp: 90 tablet, Rfl: 1    sitaGLIPtin (JANUVIA) 50 mg tablet, Take 1 tablet (50 mg total) by mouth daily, Disp: 90 tablet, Rfl: 1    verapamil (CALAN-SR) 120 mg CR tablet, Take 1 tablet (120 mg total) by mouth daily at bedtime, Disp: 90 tablet, Rfl: 1      Physical Exam:  /70 (BP Location: Left arm, Patient Position: Sitting, Cuff Size: Standard)   Pulse 63   Temp 98 4 °F (36 9 °C) (Tympanic)   Ht 5' 5" (1 651 m)   Wt 93 9 kg (207 lb)   LMP  (LMP Unknown)   SpO2 95%   BMI 34 45 kg/m²     Physical Exam  Constitutional:       General: She is not in acute distress  Appearance: Normal appearance  She is obese  HENT:      Head: Normocephalic and atraumatic  Right Ear: External ear normal       Left Ear: External ear normal    Eyes:      Conjunctiva/sclera: Conjunctivae normal       Pupils: Pupils are equal, round, and reactive to light  Cardiovascular:      Rate and Rhythm: Normal rate and regular rhythm  Heart sounds: No murmur heard  Pulmonary:      Effort: Pulmonary effort is normal  No respiratory distress  Breath sounds: Normal breath sounds  No wheezing  Musculoskeletal:         General: No deformity  Right lower leg: No edema  Left lower leg: No edema  Skin:     General: Skin is warm and dry  Coloration: Skin is not pale  Neurological:      General: No focal deficit present  Mental Status: She is oriented to person, place, and time     Psychiatric:         Mood and Affect: Mood normal          Behavior: Behavior normal            Labs:  Lab Results   Component Value Date    WBC 5 32 11/28/2018    HGB 13 8 11/28/2018    HCT 43 1 11/28/2018    MCV 97 11/28/2018     11/28/2018     Lab Results   Component Value Date     09/03/2015    K 4 1 03/21/2022     03/21/2022    CO2 26 03/21/2022 ANIONGAP 7 09/03/2015    BUN 15 03/21/2022    CREATININE 0 80 03/21/2022    GLUCOSE 110 09/03/2015    GLUF 133 (H) 03/21/2022    CALCIUM 10 1 03/21/2022    CORRECTEDCA 10 0 06/22/2021    AST 29 03/21/2022    ALT 42 03/21/2022    ALKPHOS 135 (H) 03/21/2022    PROT 7 4 09/03/2015    BILITOT 0 90 09/03/2015    EGFR 76 03/21/2022

## 2022-03-30 NOTE — PROGRESS NOTES
Assessment and Plan:     Problem List Items Addressed This Visit        Endocrine    DMII (diabetes mellitus, type 2) ([facility] [address] )    Relevant Orders    Hemoglobin A1C    Comprehensive metabolic panel    Hyperparathyroidism ([place] [address] )    Relevant Orders    Comprehensive metabolic panel    PTH, intact       Other    Depression, recurrent ([facility] )      Other Visit Diagnoses     Medicare annual wellness visit, subsequent    -  Primary    BMI 34 0-34 9,adult        Abnormal mammogram of right breast        Relevant Orders    Mammo diagnostic right w cad    US breast right limited (diagnostic)    Family history of colon cancer        due for 5 year repeat in June    Relevant Orders    Ambulatory Referral to General Surgery    Encounter for abdominal aortic aneurysm (AAA) screening        Relevant Orders    US abdominal aorta screening aaa           Preventive health issues were discussed with patient, and age appropriate screening tests were ordered as noted in patient's After Visit Summary  Personalized health advice and appropriate referrals for health education or preventive services given if needed, as noted in patient's After Visit Summary       History of Present Illness:     Patient presents for Medicare Annual Wellness visit    Patient Care Team:  Molly Reyes PA-C as PCP - General (Family Medicine)  MD Mei Cast MD Ronnell Mandril, MD Travis Sessions, MD as Endoscopist     Problem List:     Patient Active Problem List   Diagnosis    Hypertension    [facility]    Annual physical exam    Alkaline phosphatase elevation    Chronic pain of right ankle    De Quervain's tenosynovitis, left    Diverticulosis    DMII (diabetes mellitus, type 2) ([facility] )    Elevated bilirubin    Esophageal reflux    Fracture of right heel    Left ventricular hypertrophy    Seasonal allergic rhinitis    Secondary mitral valve insufficiency    Uterovaginal prolapse    History of cholecystectomy  Onychomycosis    Hyperparathyroidism [facility])      Past Medical and Surgical History:     Past Medical History:   Diagnosis Date    Anxiety     Carcinoma ([address] )     carcinoma of the skin    Chronic pain disorder     Right ankle fracture    Depression     Diabetes mellitus (HCC)     Type 2    Diverticulosis     GERD (gastroesophageal reflux disease)     Heart murmur     mitral valve insufficiency    Hypertension     Tenosynovitis, de Quervain     Vertigo     last assessed 8/7/2012     Past Surgical History:   Procedure Laterality Date    CHOLECYSTECTOMY      COLONOSCOPY      COLPOSCOPY W/ BIOPSY / CURETTAGE      cervix  last assessed 8/11/1998    ENDOMETRIAL BIOPSY      onset 4/13/2011    FOOT SURGERY      R foot and ankle fracture, fused with screws    HYSTEROSCOPY W/ POLYPECTOMY      [place] COLONOSCOPY FLX DX W/COLLJ SPEC WHEN PFRMD N/A 6/20/2017    Procedure: COLONOSCOPY;  Surgeon: Shavon Pichardo MD;  Location: [facility]; Service: General    TONSILLECTOMY        Family History:     Family History   Problem Relation Age of Onset   Vin Pert Breast cancer Mother         Breast surgery mastectomy    Dementia Mother     Colon cancer Father     Liver cancer Father       Social History:     Social History     Socioeconomic History    Marital status:       Spouse name: None    Number of children: None    Years of education: None    Highest education level: None   Occupational History    None   Tobacco Use    Smoking status: Passive Smoke Exposure - Never Smoker    Smokeless tobacco: Never Used    Tobacco comment: as per [employer] second hand  smoke exosure    quit june 2016   Vaping Use    Vaping Use: Never used   Substance and Sexual Activity    Alcohol use: No    Drug use: No    Sexual activity: Not Currently     Partners: Male   Other Topics Concern    None   Social History Narrative    Caffeine Use    Uses safety equipment- seatbelts     Social Determinants of Health Financial Resource Strain: Not on file   Food Insecurity: Not on file   Transportation Needs: Not on file   Physical Activity: Not on file   Stress: Not on file   Social Connections: Not on file   Intimate Partner Violence: Not on file   Housing Stability: Not on file      Medications and Allergies:     Current Outpatient Medications   Medication Sig Dispense Refill    Bimatoprost (LUMIGAN OP) Apply 1 drop to eye daily at bedtime      cetirizine (ZyrTEC) 10 mg tablet Take 10 mg by mouth daily      cholecalciferol (VITAMIN D3) 400 units tablet Take 400 Units by mouth daily      cyanocobalamin (VITAMIN B-12) 100 mcg tablet Take by mouth daily      diclofenac sodium (VOLTAREN) 1 % Place on the skin daily      famotidine (PEPCID) 20 mg tablet Take 20 mg by mouth daily as needed       fluticasone (FLONASE) 50 mcg/act nasal spray 2 sprays into each nostril daily 48 g 1    hydrochlorothiazide (HYDRODIURIL) 25 mg tablet Take 1 tablet by mouth once daily 90 tablet 0    losartan (COZAAR) 100 MG tablet Take 1 tablet by mouth once daily 90 tablet 0    Multiple Vitamins-Minerals (MULTIVITAMIN ADULT PO) Take by mouth      sertraline (ZOLOFT) 50 mg tablet Take 1 tablet by mouth once daily 90 tablet 1    sitaGLIPtin (JANUVIA) 50 mg tablet Take 1 tablet (50 mg total) by mouth daily 90 tablet 1    verapamil (CALAN-SR) 120 mg CR tablet Take 1 tablet (120 mg total) by mouth daily at bedtime 90 tablet 1     No current facility-administered medications for this visit       No Known Allergies   Immunizations:     Immunization History   Administered Date(s) Administered    COVID-19 MODERNA VACC 0 5 ML IM 02/20/2021, 03/20/2021    H1N1, All Formulations 01/08/2010    INFLUENZA 11/06/2008, 10/18/2018, 12/06/2021    Influenza Quadrivalent Preservative Free 3 years and older IM 10/05/2015, 09/13/2017    Influenza, high dose seasonal 0 7 mL 09/15/2020    Influenza, injectable, quadrivalent, preservative free 0 5 mL 10/17/2019    Influenza, seasonal, injectable 10/05/2010, 11/13/2012, 11/08/2013, 10/29/2014, 10/18/2016    Pneumococcal Conjugate 13-Valent 06/06/2019    Pneumococcal Polysaccharide PPV23 02/25/2016    Tdap 09/03/2015    Zoster Vaccine Recombinant 09/15/2020, 01/21/2021      Health Maintenance:         Topic Date Due    Colorectal Cancer Screening  06/20/2022    Breast Cancer Screening: Mammogram  12/01/2022    Cervical Cancer Screening  04/21/2024    Hepatitis C Screening  Completed         Topic Date Due    COVID-19 Vaccine (3 - Booster for Moderna series) 08/20/2021      Medicare Health Risk Assessment:     /70 (BP Location: Left arm, Patient Position: Sitting, Cuff Size: Standard)   Pulse 63   Temp 98 4 °F (36 9 °C) (Tympanic)   Ht 5' 5" (1 651 m)   Wt 93 9 kg (207 lb)   LMP  (LMP Unknown)   SpO2 95%   BMI 34 45 kg/m²      Michelle Barraza is here for her Subsequent Wellness visit  Health Risk Assessment:   Patient rates overall health as good  Patient feels that their physical health rating is same  Patient is satisfied with their life  Eyesight was rated as same  Hearing was rated as same  Patient feels that their emotional and mental health rating is same  Patients states they are sometimes angry  Patient states they are often unusually tired/fatigued  Pain experienced in the last 7 days has been none  Patient states that she has experienced no weight loss or gain in last 6 months  Eye exams [facility] Dr Michele Silveira     Fall Risk Screening: In the past year, patient has experienced: no history of falling in past year      Urinary Incontinence Screening:   Patient has leaked urine accidently in the last six months  Home Safety:  Patient has trouble with stairs inside or outside of their home  Patient has working smoke alarms and has working carbon monoxide detector  Home safety hazards include: none       Nutrition:   Current diet is Regular, Low Carb, No Added Salt and Limited junk food      Medications:   Patient is not currently taking any over-the-counter supplements  Patient is able to manage medications  Activities of Daily Living (ADLs)/Instrumental Activities of Daily Living (IADLs):   Walk and transfer into and out of bed and chair?: Yes  Dress and groom yourself?: Yes    Bathe or shower yourself?: Yes    Feed yourself? Yes  Do your laundry/housekeeping?: Yes  Manage your money, pay your bills and track your expenses?: Yes  Make your own meals?: Yes    Do your own shopping?: Yes    Previous Hospitalizations:   Any hospitalizations or ED visits within the last 12 months?: No      Advance Care Planning:   Living will: No    Durable POA for healthcare: No    Advanced directive: No    Advanced directive counseling given: Yes      Comments: Given form to complete and return     Cognitive Screening:   Provider or family/friend/caregiver concerned regarding cognition?: No    PREVENTIVE SCREENINGS      Cardiovascular Screening:    General: Screening Current      Diabetes Screening:     General: Screening Not Indicated and History Diabetes      Colorectal Cancer Screening:     General: Screening Current      Breast Cancer Screening:     General: Screening Current      Cervical Cancer Screening:    General: Screening Current      Osteoporosis Screening:    General: Screening Current      Abdominal Aortic Aneurysm (AAA) Screening:        General: Risks and Benefits Discussed    Due for: Screening AAA Ultrasound      Lung Cancer Screening:     General: Screening Not Indicated      Hepatitis C Screening:    General: Screening Current    Screening, Brief Intervention, and Referral to Treatment (SBIRT)    Screening  Typical number of drinks in a day: 0  Typical number of drinks in a week: 2  Interpretation: Low risk drinking behavior      AUDIT-C Screenin) How often did you have a drink containing alcohol in the past year? 2 to 3 times a week  2) How many drinks did you have on a typical day when you were drinking in the past year? 0  3) How often did you have 6 or more drinks on one occasion in the past year? less than monthly    AUDIT-C Score: 4  Interpretation: Score 3-12 (female): POSITIVE screen for alcohol misuse    AUDIT Screenin) How often during the last year have you found that you were not able to stop drinking once you had started? 0 - never  5) How often during the last year have you failed to do what was normally expected from you because of drinking? 0 - never  6) How often during the last year have you needed a first drink in the morning to get yourself going after a heavy drinking session?  0 - never  7) How often during the last year have you had a feeling of guilt or remorse after drinking? 0 - never  8) How often during the last year have you been unable to remember what happened the night before because you had been drinking? 0 - never  9) Have you or someone else been injured as a result of your drinking? 0 - no  10) Has a relative or friend or a doctor or another health worker been concerned about your drinking or suggested you cut down? 0 - no    AUDIT Score: 4  Interpretation: Low risk alcohol consumption    Single Item Drug Screening:  How often have you used an illegal drug (including marijuana) or a prescription medication for non-medical reasons in the past year? never    Single Item Drug Screen Score: 0  Interpretation: Negative screen for possible drug use disorder      Gonzalo Erickson PA-C

## 2022-03-31 DIAGNOSIS — E11.9 TYPE 2 DIABETES MELLITUS WITHOUT COMPLICATION, WITHOUT LONG-TERM CURRENT USE OF INSULIN (HCC): ICD-10-CM

## 2022-03-31 RX ORDER — SITAGLIPTIN 50 MG/1
TABLET, FILM COATED ORAL
Qty: 90 TABLET | Refills: 1 | Status: SHIPPED | OUTPATIENT
Start: 2022-03-31

## 2022-05-02 DIAGNOSIS — I10 ESSENTIAL HYPERTENSION: ICD-10-CM

## 2022-05-02 RX ORDER — LOSARTAN POTASSIUM 100 MG/1
TABLET ORAL
Qty: 90 TABLET | Refills: 0 | Status: SHIPPED | OUTPATIENT
Start: 2022-05-02 | End: 2022-08-03

## 2022-05-02 RX ORDER — HYDROCHLOROTHIAZIDE 25 MG/1
TABLET ORAL
Qty: 90 TABLET | Refills: 0 | Status: SHIPPED | OUTPATIENT
Start: 2022-05-02 | End: 2022-08-03

## 2022-05-12 ENCOUNTER — HOSPITAL ENCOUNTER (EMERGENCY)
Facility: HOSPITAL | Age: 67
Discharge: HOME | End: 2022-05-13
Attending: EMERGENCY MEDICINE
Payer: MEDICARE

## 2022-05-12 ENCOUNTER — APPOINTMENT (EMERGENCY)
Dept: RADIOLOGY | Facility: HOSPITAL | Age: 67
End: 2022-05-12
Payer: MEDICARE

## 2022-05-12 VITALS
RESPIRATION RATE: 18 BRPM | OXYGEN SATURATION: 92 % | SYSTOLIC BLOOD PRESSURE: 123 MMHG | TEMPERATURE: 97.6 F | HEART RATE: 77 BPM | DIASTOLIC BLOOD PRESSURE: 77 MMHG

## 2022-05-12 DIAGNOSIS — R10.10 PAIN OF UPPER ABDOMEN: Primary | ICD-10-CM

## 2022-05-12 DIAGNOSIS — R74.01 TRANSAMINITIS: ICD-10-CM

## 2022-05-12 DIAGNOSIS — K44.9 HIATAL HERNIA: ICD-10-CM

## 2022-05-12 DIAGNOSIS — K76.0 HEPATIC STEATOSIS: ICD-10-CM

## 2022-05-12 DIAGNOSIS — R19.7 DIARRHEA, UNSPECIFIED TYPE: ICD-10-CM

## 2022-05-12 DIAGNOSIS — R11.0 NAUSEA: ICD-10-CM

## 2022-05-12 LAB
ALBUMIN SERPL BCP-MCNC: 3.5 G/DL (ref 3.5–5)
ALP SERPL-CCNC: 152 U/L (ref 46–116)
ALT SERPL W P-5'-P-CCNC: 112 U/L (ref 12–78)
ANION GAP SERPL CALCULATED.3IONS-SCNC: 7 MMOL/L (ref 4–13)
AST SERPL W P-5'-P-CCNC: 132 U/L (ref 5–45)
BASOPHILS # BLD AUTO: 0.02 THOUSANDS/ΜL (ref 0–0.1)
BASOPHILS NFR BLD AUTO: 0 % (ref 0–1)
BILIRUB SERPL-MCNC: 1.36 MG/DL (ref 0.2–1)
BUN SERPL-MCNC: 17 MG/DL (ref 5–25)
CALCIUM SERPL-MCNC: 10.2 MG/DL (ref 8.3–10.1)
CARDIAC TROPONIN I PNL SERPL HS: 5 NG/L
CHLORIDE SERPL-SCNC: 105 MMOL/L (ref 100–108)
CO2 SERPL-SCNC: 26 MMOL/L (ref 21–32)
CREAT SERPL-MCNC: 0.86 MG/DL (ref 0.6–1.3)
EOSINOPHIL # BLD AUTO: 0.02 THOUSAND/ΜL (ref 0–0.61)
EOSINOPHIL NFR BLD AUTO: 0 % (ref 0–6)
ERYTHROCYTE [DISTWIDTH] IN BLOOD BY AUTOMATED COUNT: 13.7 % (ref 11.6–15.1)
GFR SERPL CREATININE-BSD FRML MDRD: 70 ML/MIN/1.73SQ M
GLUCOSE SERPL-MCNC: 168 MG/DL (ref 65–140)
HCT VFR BLD AUTO: 42.4 % (ref 34.8–46.1)
HGB BLD-MCNC: 13.6 G/DL (ref 11.5–15.4)
IMM GRANULOCYTES # BLD AUTO: 0.03 THOUSAND/UL (ref 0–0.2)
IMM GRANULOCYTES NFR BLD AUTO: 0 % (ref 0–2)
LIPASE SERPL-CCNC: 227 U/L (ref 73–393)
LYMPHOCYTES # BLD AUTO: 1.38 THOUSANDS/ΜL (ref 0.6–4.47)
LYMPHOCYTES NFR BLD AUTO: 13 % (ref 14–44)
MCH RBC QN AUTO: 30.5 PG (ref 26.8–34.3)
MCHC RBC AUTO-ENTMCNC: 32.1 G/DL (ref 31.4–37.4)
MCV RBC AUTO: 95 FL (ref 82–98)
MONOCYTES # BLD AUTO: 0.62 THOUSAND/ΜL (ref 0.17–1.22)
MONOCYTES NFR BLD AUTO: 6 % (ref 4–12)
NEUTROPHILS # BLD AUTO: 8.27 THOUSANDS/ΜL (ref 1.85–7.62)
NEUTS SEG NFR BLD AUTO: 81 % (ref 43–75)
NRBC BLD AUTO-RTO: 0 /100 WBCS
PLATELET # BLD AUTO: 213 THOUSANDS/UL (ref 149–390)
PMV BLD AUTO: 9.2 FL (ref 8.9–12.7)
POTASSIUM SERPL-SCNC: 3.5 MMOL/L (ref 3.5–5.3)
PROT SERPL-MCNC: 7.8 G/DL (ref 6.4–8.2)
RBC # BLD AUTO: 4.46 MILLION/UL (ref 3.81–5.12)
SODIUM SERPL-SCNC: 138 MMOL/L (ref 136–145)
WBC # BLD AUTO: 10.34 THOUSAND/UL (ref 4.31–10.16)

## 2022-05-12 PROCEDURE — G1004 CDSM NDSC: HCPCS

## 2022-05-12 PROCEDURE — 99284 EMERGENCY DEPT VISIT MOD MDM: CPT

## 2022-05-12 PROCEDURE — 84484 ASSAY OF TROPONIN QUANT: CPT | Performed by: EMERGENCY MEDICINE

## 2022-05-12 PROCEDURE — 74176 CT ABD & PELVIS W/O CONTRAST: CPT

## 2022-05-12 PROCEDURE — 83690 ASSAY OF LIPASE: CPT | Performed by: EMERGENCY MEDICINE

## 2022-05-12 PROCEDURE — 85025 COMPLETE CBC W/AUTO DIFF WBC: CPT | Performed by: EMERGENCY MEDICINE

## 2022-05-12 PROCEDURE — 99285 EMERGENCY DEPT VISIT HI MDM: CPT | Performed by: EMERGENCY MEDICINE

## 2022-05-12 PROCEDURE — 80053 COMPREHEN METABOLIC PANEL: CPT | Performed by: EMERGENCY MEDICINE

## 2022-05-12 PROCEDURE — 93005 ELECTROCARDIOGRAM TRACING: CPT

## 2022-05-12 PROCEDURE — 36415 COLL VENOUS BLD VENIPUNCTURE: CPT | Performed by: EMERGENCY MEDICINE

## 2022-05-13 LAB
2HR DELTA HS TROPONIN: -1 NG/L
ATRIAL RATE: 75 BPM
CARDIAC TROPONIN I PNL SERPL HS: 4 NG/L
P AXIS: 70 DEGREES
PR INTERVAL: 200 MS
QRS AXIS: 61 DEGREES
QRSD INTERVAL: 136 MS
QT INTERVAL: 394 MS
QTC INTERVAL: 439 MS
T WAVE AXIS: 24 DEGREES
VENTRICULAR RATE: 75 BPM

## 2022-05-13 PROCEDURE — 84484 ASSAY OF TROPONIN QUANT: CPT | Performed by: EMERGENCY MEDICINE

## 2022-05-13 PROCEDURE — 93010 ELECTROCARDIOGRAM REPORT: CPT | Performed by: INTERNAL MEDICINE

## 2022-05-13 PROCEDURE — 36415 COLL VENOUS BLD VENIPUNCTURE: CPT | Performed by: EMERGENCY MEDICINE

## 2022-05-13 RX ORDER — ONDANSETRON 4 MG/1
4 TABLET, ORALLY DISINTEGRATING ORAL EVERY 6 HOURS PRN
Qty: 12 TABLET | Refills: 0 | Status: SHIPPED | OUTPATIENT
Start: 2022-05-13 | End: 2022-05-13

## 2022-05-13 NOTE — ED PROVIDER NOTES
History  Chief Complaint   Patient presents with    Abdominal Pain     Pt was at [facility] where she had a salad with shrimp, about an hour later started experiencing abdominal pain, nausea, diarrhea, no vomiting     77-year-old female with history of diabetes, hypertension, status post cholecystectomy who presents for evaluation of abdominal pain, nausea, diarrhea  Patient reports that she was at the [facility] this evening and feeling in her usual state of health  She ate a which salad with growth shrimp and approximately 1 hour later around 8:30 p m  Developed sudden onset of upper abdominal pain  She reports that the pain was cramping in quality and was across the entirety of her upper abdomen  She had associated nausea and diaphoresis but did not vomit  She was unable to walk because her symptoms were so severe  She was assisted to the bathroom by security and had an episode of loose stools that were nonbloody  Shortly after this, her abdominal pain seemed to improve but she still continues to have mild upper abdominal discomfort  Her nausea has resolved at this time  She denies fevers, chest pain, cough  She did try taking Tums for her symptoms prior to arrival           Prior to Admission Medications   Prescriptions Last Dose Informant Patient Reported? Taking?    Bimatoprost (LUMIGAN OP)   Yes No   Sig: Apply 1 drop to eye daily at bedtime   Januvia 50 MG tablet   No No   Sig: TAKE 1 TABLET DAILY   Multiple Vitamins-Minerals (MULTIVITAMIN ADULT PO)   Yes No   Sig: Take by mouth   cetirizine (ZyrTEC) 10 mg tablet   Yes No   Sig: Take 10 mg by mouth daily   cholecalciferol (VITAMIN D3) 400 units tablet   Yes No   Sig: Take 400 Units by mouth daily   cyanocobalamin (VITAMIN B-12) 100 mcg tablet   Yes No   Sig: Take by mouth daily   diclofenac sodium (VOLTAREN) 1 %   Yes No   Sig: Place on the skin daily   famotidine (PEPCID) 20 mg tablet   Yes No   Sig: Take 20 mg by mouth daily as needed    fluticasone (FLONASE) 50 mcg/act nasal spray   No No   Si sprays into each nostril daily   hydrochlorothiazide (HYDRODIURIL) 25 mg tablet   No No   Sig: Take 1 tablet by mouth once daily   losartan (COZAAR) 100 MG tablet   No No   Sig: Take 1 tablet by mouth once daily   sertraline (ZOLOFT) 50 mg tablet   No No   Sig: Take 1 tablet by mouth once daily   verapamil (CALAN-SR) 120 mg CR tablet   No No   Sig: Take 1 tablet (120 mg total) by mouth daily at bedtime      Facility-Administered Medications: None       Past Medical History:   Diagnosis Date    Anxiety     Carcinoma ([facility] [address] )     carcinoma of the skin    Chronic pain disorder     Right ankle fracture    Depression     Diabetes mellitus (HCC)     Type 2    Diverticulosis     GERD (gastroesophageal reflux disease)     Heart murmur     mitral valve insufficiency    Hypertension     Tenosynovitis, de Quervain     Vertigo     last assessed 2012       Past Surgical History:   Procedure Laterality Date    CHOLECYSTECTOMY      COLONOSCOPY      COLPOSCOPY W/ BIOPSY / CURETTAGE      cervix  last assessed 1998    ENDOMETRIAL BIOPSY      onset 2011    FOOT SURGERY      R foot and ankle fracture, fused with screws    HYSTEROSCOPY W/ POLYPECTOMY      TX COLONOSCOPY FLX DX W/COLLJ SPEC WHEN PFRMD N/A 2017    Procedure: COLONOSCOPY;  Surgeon: Leny Valentine MD;  Location: [facility]; Service: General    TONSILLECTOMY         Family History   Problem Relation Age of Onset   Vickie Fuentes Breast cancer Mother         Breast surgery mastectomy    Dementia Mother     Colon cancer Father     Liver cancer Father      I have reviewed and agree with the history as documented      E-Cigarette/Vaping    E-Cigarette Use Never User      E-Cigarette/Vaping Substances    Nicotine No     THC No     CBD No     Flavoring No     Other No     Unknown No      Social History     Tobacco Use    Smoking status: Passive Smoke Exposure - Never Smoker    Smokeless tobacco: Never Used    Tobacco comment: as per [employer] second hand  smoke exosure    quit june 2016   Vaping Use    Vaping Use: Never used   Substance Use Topics    Alcohol use: No    Drug use: No        Review of Systems   Constitutional: Positive for diaphoresis  Negative for chills and fever  Eyes: Negative for visual disturbance  Respiratory: Negative for chest tightness and shortness of breath  Cardiovascular: Negative for chest pain  Gastrointestinal: Positive for abdominal pain, diarrhea and nausea  Negative for abdominal distention, constipation and vomiting  Genitourinary: Negative for dysuria, flank pain, frequency and urgency  Musculoskeletal: Negative for back pain and gait problem  Skin: Negative for pallor and rash  Neurological: Negative for syncope, weakness and light-headedness  All other systems reviewed and are negative  Physical Exam  ED Triage Vitals [05/12/22 2121]   Temperature Pulse Respirations Blood Pressure SpO2   97 6 °F (36 4 °C) 77 18 123/77 92 %      Temp Source Heart Rate Source Patient Position - Orthostatic VS BP Location FiO2 (%)   Oral Monitor Lying Right arm --      Pain Score       5             Orthostatic Vital Signs  Vitals:    05/12/22 2121   BP: 123/77   Pulse: 77   Patient Position - Orthostatic VS: Lying       Physical Exam  Vitals and nursing note reviewed  Constitutional:       General: She is not in acute distress  Appearance: She is well-developed  She is not ill-appearing or diaphoretic  HENT:      Head: Normocephalic and atraumatic  Nose: Nose normal       Mouth/Throat:      Mouth: Mucous membranes are moist       Pharynx: No oropharyngeal exudate or posterior oropharyngeal erythema  Eyes:      Extraocular Movements: Extraocular movements intact  Cardiovascular:      Rate and Rhythm: Normal rate and regular rhythm  Heart sounds: No murmur heard  No friction rub  No gallop     Pulmonary:      Effort: Pulmonary effort is normal       Breath sounds: Normal breath sounds  No wheezing, rhonchi or rales  Abdominal:      General: There is no distension  Palpations: Abdomen is soft  Tenderness: There is abdominal tenderness in the right upper quadrant, epigastric area and left upper quadrant  There is no guarding or rebound  Musculoskeletal:         General: No swelling  Normal range of motion  Cervical back: Normal range of motion and neck supple  Skin:     General: Skin is warm and dry  Coloration: Skin is not pale  Findings: No rash  Neurological:      General: No focal deficit present  Mental Status: She is alert and oriented to person, place, and time     Psychiatric:         Behavior: Behavior normal          ED Medications  Medications - No data to display    Diagnostic Studies  Results Reviewed     Procedure Component Value Units Date/Time    HS Troponin I 2hr [[id number]]  (Normal) Collected: 05/13/22 0118    Lab Status: Final result Specimen: Blood from Arm, Right Updated: 05/13/22 0200     hs TnI 2hr 4 ng/L      Delta 2hr hsTnI -1 ng/L     HS Troponin 0hr (reflex protocol) [[id number]]  (Normal) Collected: 05/12/22 2303    Lab Status: Final result Specimen: Blood from Arm, Right Updated: 05/12/22 2351     hs TnI 0hr 5 ng/L     Comprehensive metabolic panel [[id number]]  (Abnormal) Collected: 05/12/22 2303    Lab Status: Final result Specimen: Blood from Arm, Right Updated: 05/12/22 2343     Sodium 138 mmol/L      Potassium 3 5 mmol/L      Chloride 105 mmol/L      CO2 26 mmol/L      ANION GAP 7 mmol/L      BUN 17 mg/dL      Creatinine 0 86 mg/dL      Glucose 168 mg/dL      Calcium 10 2 mg/dL       U/L       U/L      Alkaline Phosphatase 152 U/L      Total Protein 7 8 g/dL      Albumin 3 5 g/dL      Total Bilirubin 1 36 mg/dL      eGFR 70 ml/min/1 73sq m     Narrative:      [place] guidelines for Chronic Kidney Disease (CKD):     Stage 1 with normal or high GFR (GFR > 90 mL/min/1 73 square meters)    Stage 2 Mild CKD (GFR = 60-89 mL/min/1 73 square meters)    Stage 3A Moderate CKD (GFR = 45-59 mL/min/1 73 square meters)    Stage 3B Moderate CKD (GFR = 30-44 mL/min/1 73 square meters)    Stage 4 Severe CKD (GFR = 15-29 mL/min/1 73 square meters)    Stage 5 End Stage CKD (GFR <15 mL/min/1 73 square meters)  Note: GFR calculation is accurate only with a steady state creatinine    Lipase [[id number]]  (Normal) Collected: 05/12/22 2303    Lab Status: Final result Specimen: Blood from Arm, Right Updated: 05/12/22 2343     Lipase 227 u/L     CBC and differential [[id number]]  (Abnormal) Collected: 05/12/22 2303    Lab Status: Final result Specimen: Blood from Arm, Right Updated: 05/12/22 2317     WBC 10 34 Thousand/uL      RBC 4 46 Million/uL      Hemoglobin 13 6 g/dL      Hematocrit 42 4 %      MCV 95 fL      MCH 30 5 pg      MCHC 32 1 g/dL      RDW 13 7 %      MPV 9 2 fL      Platelets 315 Thousands/uL      nRBC 0 /100 WBCs      Neutrophils Relative 81 %      Immat GRANS % 0 %      Lymphocytes Relative 13 %      Monocytes Relative 6 %      Eosinophils Relative 0 %      Basophils Relative 0 %      Neutrophils Absolute 8 27 Thousands/µL      Immature Grans Absolute 0 03 Thousand/uL      Lymphocytes Absolute 1 38 Thousands/µL      Monocytes Absolute 0 62 Thousand/µL      Eosinophils Absolute 0 02 Thousand/µL      Basophils Absolute 0 02 Thousands/µL                  CT abdomen pelvis wo contrast   Final Result by James Mendoza MD (05/12 3900)      Moderate-sized hiatal hernia  Hepatic steatosis  Colonic diverticulosis              Workstation performed: [id number]               Procedures  Procedures      ED Course  ED Course as of 05/14/22 0727   Thu May 12, 2022   2319 CBC and differential(!)   2324 Procedure Note: EKG  Date/Time: 05/12/22 9:32 PM   Interpreted by: Giovanna Diaz  Indications / Diagnosis: upper abdominal pain  ECG reviewed by me, the ED Provider: yes   The EKG demonstrates:  Rhythm: rate 75, normal sinus  Intervals: normal intervals  Axis: normal axis  QRS/Blocks: RBBB  ST Changes: No acute ST Changes, no STD/MARIA GUADALUPE       2359 hs TnI 0hr: 5   Fri May 13, 2022   0026 Patient re-evaluated  Pain has completely resolved, no longer tender in the upper abdomen on exam  I updated patient on results including mildly elevated LFTs and incidental findings of hepatic steatosis and hiatal hernia on CT  Advised her to follow up with her PCP regarding these findings  Aware that we are awaiting delta troponin at this time prior to discharge  SBIRT 20yo+    Flowsheet Row Most Recent Value   SBIRT (25 yo +)    In order to provide better care to our patients, we are screening all of our patients for alcohol and drug use  Would it be okay to ask you these screening questions? Yes Filed at: 05/12/2022 2123   Initial Alcohol Screen: US AUDIT-C     1  How often do you have a drink containing alcohol? 0 Filed at: 05/12/2022 2123   2  How many drinks containing alcohol do you have on a typical day you are drinking? 0 Filed at: 05/12/2022 2123   3b  FEMALE Any Age, or MALE 65+: How often do you have 4 or more drinks on one occassion? 0 Filed at: 05/12/2022 2123   Audit-C Score 0 Filed at: 05/12/2022 2123   DELLA: How many times in the past year have you    Used an illegal drug or used a prescription medication for non-medical reasons? Never Filed at: 05/12/2022 2123                MDM  Number of Diagnoses or Management Options  Diarrhea, unspecified type: new and requires workup  Hepatic steatosis: new and requires workup  Hiatal hernia: new and requires workup  Nausea: new and requires workup  Pain of upper abdomen: new and requires workup  Transaminitis: new and requires workup  Diagnosis management comments: 59-year-old female who presents for evaluation of abdominal pain, nausea, diarrhea  Benign exam, normal vital signs    Will obtain abdominal labs, cardiac workup, CT abdomen pelvis  Imaging with incidental findings of hiatal hernia and hepatic steatosis but otherwise no acute pathology  Labs showing mild transaminitis but otherwise unremarkable  Patient with resolution of her symptoms during observation in the ED  Signed out to Dr Aliyah Kennedy pending delta troponin  On chart review, patient discharged  Amount and/or Complexity of Data Reviewed  Clinical lab tests: ordered and reviewed  Tests in the radiology section of CPT®: ordered and reviewed  Review and summarize past medical records: yes    Risk of Complications, Morbidity, and/or Mortality  Presenting problems: high  Diagnostic procedures: low  Management options: minimal    Patient Progress  Patient progress: resolved      Disposition  Final diagnoses:   Pain of upper abdomen   Nausea   Diarrhea, unspecified type   Transaminitis   Hiatal hernia   Hepatic steatosis     Time reflects when diagnosis was documented in both MDM as applicable and the Disposition within this note     Time User Action Codes Description Comment    5/13/2022 12:42 AM Robert George Add [R10 10] Pain of upper abdomen     5/13/2022 12:42 AM Robert George Add [R11 0] Nausea     5/13/2022 12:43 AM Robert George Add [R19 7] Diarrhea, unspecified type     5/13/2022 12:43 AM Robert George Add [R74 01] Transaminitis     5/13/2022 12:43 AM Robert George Add [K44 9] Hiatal hernia     5/13/2022 12:43 AM Robert George Add [K76 0] Hepatic steatosis       ED Disposition     ED Disposition   Discharge    Condition   Stable    Date/Time   Fri May 13, 2022  2:02 AM    Comment   Ryan Wolff discharge to home/self care                 Follow-up Information     Follow up With Specialties Details Why Contact Info    Bhavna Leslie PA-C Family Medicine, Physician Assistant In 2 days  [address]  [phone]            Discharge Medication List as of 5/13/2022  2:02 AM START taking these medications    Details   ondansetron (Zofran ODT) 4 mg disintegrating tablet Take 1 tablet (4 mg total) by mouth every 6 (six) hours as needed for nausea or vomiting for up to 3 days, Starting Fri 5/13/2022, Until Mon 5/16/2022 at 2359, Normal         CONTINUE these medications which have NOT CHANGED    Details   Bimatoprost (LUMIGAN OP) Apply 1 drop to eye daily at bedtime, Historical Med      cetirizine (ZyrTEC) 10 mg tablet Take 10 mg by mouth daily, Historical Med      cholecalciferol (VITAMIN D3) 400 units tablet Take 400 Units by mouth daily, Historical Med      cyanocobalamin (VITAMIN B-12) 100 mcg tablet Take by mouth daily, Historical Med      diclofenac sodium (VOLTAREN) 1 % Place on the skin daily, Historical Med      famotidine (PEPCID) 20 mg tablet Take 20 mg by mouth daily as needed , Historical Med      fluticasone (FLONASE) 50 mcg/act nasal spray 2 sprays into each nostril daily, Starting Wed 3/9/2022, Normal      hydrochlorothiazide (HYDRODIURIL) 25 mg tablet Take 1 tablet by mouth once daily, Normal      Januvia 50 MG tablet TAKE 1 TABLET DAILY, Normal      losartan (COZAAR) 100 MG tablet Take 1 tablet by mouth once daily, Normal      Multiple Vitamins-Minerals (MULTIVITAMIN ADULT PO) Take by mouth, Historical Med      sertraline (ZOLOFT) 50 mg tablet Take 1 tablet by mouth once daily, Normal      verapamil (CALAN-SR) 120 mg CR tablet Take 1 tablet (120 mg total) by mouth daily at bedtime, Starting Mon 9/27/2021, Normal           No discharge procedures on file  PDMP Review     None           ED Provider  Attending physically available and evaluated Gilda Klein  SANDRINE managed the patient along with the ED Attending      Electronically Signed by         Bruce Otero MD  05/14/22 7165

## 2022-05-13 NOTE — ED ATTENDING ATTESTATION
5/12/2022  IJoseline MD, saw and evaluated the patient  I have discussed the patient with the resident/non-physician practitioner and agree with the resident's/non-physician practitioner's findings, Plan of Care, and MDM as documented in the resident's/non-physician practitioner's note, except where noted  All available labs and Radiology studies were reviewed  I was present for key portions of any procedure(s) performed by the resident/non-physician practitioner and I was immediately available to provide assistance  At this point I agree with the current assessment done in the Emergency Department  I have conducted an independent evaluation of this patient a history and physical is as follows:    ED Course    60-year-old female presents with upper abdominal pain and diarrhea after eating dinner at [facility]  Patient states she ate salad with shrimp  Shoulder after completing her meal she felt ill nauseous with upper abdominal cramping and diarrhea  Diarrhea was nonbloody she denies any vomiting denies any chest pain past medical history remarkable for diabetes mellitus, cholecystitis status post cholecystectomy complicated by retained stone require retrieval     Vital signs reviewed  Patient is well-appearing nontoxic no acute distress heart is regular rate rhythm without murmurs  Lungs clear to auscultation bilaterally  Abdomen is soft with epigastric and right and left upper quadrant tenderness to palpation is no guarding or rebound present  Normal bowel sounds  No CVA tenderness  Extremities no edema    Impression:  Abdominal pain nausea and diarrhea  Plan check screening lab CTAP cardiac evaluation antiemetics as needed IV fluids pain control reassess anticipate discharge if negative ED workup      Critical Care Time  Procedures

## 2022-05-13 NOTE — DISCHARGE INSTRUCTIONS
Be sure to follow up with your primary care physician regarding your elevated liver testing  Please return to the emergency department if you develop worsening symptoms, return of your pain, vomiting, fever, or anything else concerning to you

## 2022-05-18 ENCOUNTER — APPOINTMENT (OUTPATIENT)
Dept: RADIOLOGY | Facility: CLINIC | Age: 67
End: 2022-05-18
Payer: MEDICARE

## 2022-05-18 ENCOUNTER — APPOINTMENT (OUTPATIENT)
Dept: LAB | Facility: CLINIC | Age: 67
End: 2022-05-18
Payer: MEDICARE

## 2022-05-18 ENCOUNTER — TELEMEDICINE (OUTPATIENT)
Dept: FAMILY MEDICINE CLINIC | Facility: CLINIC | Age: 67
End: 2022-05-18
Payer: MEDICARE

## 2022-05-18 DIAGNOSIS — R17 ELEVATED BILIRUBIN: ICD-10-CM

## 2022-05-18 DIAGNOSIS — R10.84 GENERALIZED ABDOMINAL PAIN: ICD-10-CM

## 2022-05-18 DIAGNOSIS — R05.9 COUGH: ICD-10-CM

## 2022-05-18 DIAGNOSIS — R74.01 TRANSAMINITIS: ICD-10-CM

## 2022-05-18 DIAGNOSIS — R19.7 DIARRHEA OF PRESUMED INFECTIOUS ORIGIN: ICD-10-CM

## 2022-05-18 DIAGNOSIS — R74.01 TRANSAMINITIS: Primary | ICD-10-CM

## 2022-05-18 DIAGNOSIS — D72.825 BANDEMIA: ICD-10-CM

## 2022-05-18 LAB
ALBUMIN SERPL BCP-MCNC: 3.2 G/DL (ref 3.5–5)
ALP SERPL-CCNC: 145 U/L (ref 46–116)
ALT SERPL W P-5'-P-CCNC: 59 U/L (ref 12–78)
ANION GAP SERPL CALCULATED.3IONS-SCNC: 1 MMOL/L (ref 4–13)
ANISOCYTOSIS BLD QL SMEAR: PRESENT
AST SERPL W P-5'-P-CCNC: 23 U/L (ref 5–45)
BASOPHILS # BLD MANUAL: 0.05 THOUSAND/UL (ref 0–0.1)
BASOPHILS NFR MAR MANUAL: 1 % (ref 0–1)
BILIRUB SERPL-MCNC: 0.66 MG/DL (ref 0.2–1)
BUN SERPL-MCNC: 14 MG/DL (ref 5–25)
CALCIUM ALBUM COR SERPL-MCNC: 10.5 MG/DL (ref 8.3–10.1)
CALCIUM SERPL-MCNC: 9.9 MG/DL (ref 8.3–10.1)
CHLORIDE SERPL-SCNC: 104 MMOL/L (ref 100–108)
CO2 SERPL-SCNC: 31 MMOL/L (ref 21–32)
CREAT SERPL-MCNC: 0.74 MG/DL (ref 0.6–1.3)
CRP SERPL QL: 47.5 MG/L
EOSINOPHIL # BLD MANUAL: 0 THOUSAND/UL (ref 0–0.4)
EOSINOPHIL NFR BLD MANUAL: 0 % (ref 0–6)
ERYTHROCYTE [DISTWIDTH] IN BLOOD BY AUTOMATED COUNT: 13.6 % (ref 11.6–15.1)
GFR SERPL CREATININE-BSD FRML MDRD: 84 ML/MIN/1.73SQ M
GLUCOSE SERPL-MCNC: 101 MG/DL (ref 65–140)
HAV IGM SER QL: NORMAL
HBV CORE IGM SER QL: NORMAL
HBV SURFACE AG SER QL: NORMAL
HCT VFR BLD AUTO: 38.3 % (ref 34.8–46.1)
HCV AB SER QL: NORMAL
HGB BLD-MCNC: 12.4 G/DL (ref 11.5–15.4)
LYMPHOCYTES # BLD AUTO: 1.09 THOUSAND/UL (ref 0.6–4.47)
LYMPHOCYTES # BLD AUTO: 22 % (ref 14–44)
MCH RBC QN AUTO: 31.1 PG (ref 26.8–34.3)
MCHC RBC AUTO-ENTMCNC: 32.4 G/DL (ref 31.4–37.4)
MCV RBC AUTO: 96 FL (ref 82–98)
MONOCYTES # BLD AUTO: 0.2 THOUSAND/UL (ref 0–1.22)
MONOCYTES NFR BLD: 4 % (ref 4–12)
NEUTROPHILS # BLD MANUAL: 3.56 THOUSAND/UL (ref 1.85–7.62)
NEUTS BAND NFR BLD MANUAL: 1 % (ref 0–8)
NEUTS SEG NFR BLD AUTO: 71 % (ref 43–75)
PLATELET # BLD AUTO: 204 THOUSANDS/UL (ref 149–390)
PLATELET BLD QL SMEAR: ADEQUATE
PMV BLD AUTO: 9.5 FL (ref 8.9–12.7)
POLYCHROMASIA BLD QL SMEAR: PRESENT
POTASSIUM SERPL-SCNC: 3.5 MMOL/L (ref 3.5–5.3)
PROT SERPL-MCNC: 7.5 G/DL (ref 6.4–8.2)
RBC # BLD AUTO: 3.99 MILLION/UL (ref 3.81–5.12)
RBC MORPH BLD: PRESENT
SODIUM SERPL-SCNC: 136 MMOL/L (ref 136–145)
VARIANT LYMPHS # BLD AUTO: 1 %
WBC # BLD AUTO: 4.95 THOUSAND/UL (ref 4.31–10.16)

## 2022-05-18 PROCEDURE — 80053 COMPREHEN METABOLIC PANEL: CPT

## 2022-05-18 PROCEDURE — 71046 X-RAY EXAM CHEST 2 VIEWS: CPT

## 2022-05-18 PROCEDURE — 85027 COMPLETE CBC AUTOMATED: CPT

## 2022-05-18 PROCEDURE — 99214 OFFICE O/P EST MOD 30 MIN: CPT | Performed by: FAMILY MEDICINE

## 2022-05-18 PROCEDURE — 80074 ACUTE HEPATITIS PANEL: CPT

## 2022-05-18 PROCEDURE — 86140 C-REACTIVE PROTEIN: CPT

## 2022-05-18 PROCEDURE — 36415 COLL VENOUS BLD VENIPUNCTURE: CPT

## 2022-05-18 PROCEDURE — 85007 BL SMEAR W/DIFF WBC COUNT: CPT

## 2022-05-18 PROCEDURE — 87505 NFCT AGENT DETECTION GI: CPT

## 2022-05-18 RX ORDER — AZITHROMYCIN 250 MG/1
TABLET, FILM COATED ORAL
Qty: 6 TABLET | Refills: 0 | Status: SHIPPED | OUTPATIENT
Start: 2022-05-18 | End: 2022-05-22

## 2022-05-18 NOTE — PROGRESS NOTES
COVID-19 Outpatient Progress Note    Assessment/Plan:  Lennox Romney was seen today for covid-19  Diagnoses and all orders for this visit:    Transaminitis  Comments:  etiol yet TBD - ER w/u un-revealing including CT abd; possibility of salmonella or other bacterial food-borne illness- will start abx and repeat labs/check acute viral hepatitis panel  Pt is not an alcohol drinker; she was advised by me to avoid any tylenol for now  Orders:  -     Comprehensive metabolic panel; Future  -     C-reactive protein; Future  -     Stool Enteric Bacterial Panel by PCR; Future  -     Hepatitis panel, acute; Future    Generalized abdominal pain  Comments:  improved, but persists, as above recheck labs and check acute viral hepatitis panel  Orders:  -     CBC and differential; Future  -     Comprehensive metabolic panel; Future  -     XR chest pa & lateral; Future  -     C-reactive protein; Future  -     Stool Enteric Bacterial Panel by PCR; Future  -     White Blood Cells, Stool by Gram Stain; Future  -     Hepatitis panel, acute; Future  -     azithromycin (ZITHROMAX) 250 mg tablet; Take 2 tablets today then 1 tablet daily x 4 days    Bandemia  Comments:  on ER labs per chart review by me  Orders:  -     CBC and differential; Future  -     XR chest pa & lateral; Future  -     C-reactive protein; Future  -     Stool Enteric Bacterial Panel by PCR; Future  -     White Blood Cells, Stool by Gram Stain; Future  -     azithromycin (ZITHROMAX) 250 mg tablet; Take 2 tablets today then 1 tablet daily x 4 days    Elevated bilirubin  -     Comprehensive metabolic panel; Future    Cough  Comments:  home COVID test was negative per pt  Orders:  -     XR chest pa & lateral; Future  -     azithromycin (ZITHROMAX) 250 mg tablet; Take 2 tablets today then 1 tablet daily x 4 days    Diarrhea of presumed infectious origin   -     Stool Enteric Bacterial Panel by PCR; Future  -     azithromycin (ZITHROMAX) 250 mg tablet;  Take 2 tablets today then 1 tablet daily x 4 days        Problem List Items Addressed This Visit        Other    Elevated bilirubin    Relevant Orders    Comprehensive metabolic panel (Completed)      Other Visit Diagnoses     Transaminitis    -  Primary    etiol yet TBD - ER w/u un-revealing including CT abd; possibility of salmonella or other bacterial food-borne illness- will start abx and repeat labs/check hep     Relevant Orders    Comprehensive metabolic panel (Completed)    C-reactive protein (Completed)    Stool Enteric Bacterial Panel by PCR (Completed)    Hepatitis panel, acute (Completed)    Generalized abdominal pain        improved, but persists, as above recheck labs and check acute viral hepatitis panel    Relevant Medications    azithromycin (ZITHROMAX) 250 mg tablet    Other Relevant Orders    CBC and differential (Completed)    Comprehensive metabolic panel (Completed)    XR chest pa & lateral (Completed)    C-reactive protein (Completed)    Stool Enteric Bacterial Panel by PCR (Completed)    White Blood Cells, Stool by Gram Stain (Completed)    Hepatitis panel, acute (Completed)    Bandemia        on ER labs per chart review by me    Relevant Medications    azithromycin (ZITHROMAX) 250 mg tablet    Other Relevant Orders    CBC and differential (Completed)    XR chest pa & lateral (Completed)    C-reactive protein (Completed)    Stool Enteric Bacterial Panel by PCR (Completed)    White Blood Cells, Stool by Gram Stain (Completed)    Cough        home COVID test was negative per pt    Relevant Medications    azithromycin (ZITHROMAX) 250 mg tablet    Other Relevant Orders    XR chest pa & lateral (Completed)    Diarrhea of presumed infectious origin         Relevant Medications    azithromycin (ZITHROMAX) 250 mg tablet    Other Relevant Orders    Stool Enteric Bacterial Panel by PCR (Completed)         Disposition:     After clarifying the patient's history, my suspicion for COVID-19 infection is very low       I have spent 32 minutes directly with the patient  Encounter provider Sanjay Maldonado DO    Provider located at [address] [facility]  [address] [facility] [address]    Recent Visits  Date Type Provider Dept   05/18/22 Telemedicine Sanjay Maldonado, [address] recent visits within past 7 days and meeting all other requirements  Future Appointments  No visits were found meeting these conditions  Showing future appointments within next 150 days and meeting all other requirements     This virtual check-in was done via [employer] and patient was informed that this is a secure, HIPAA-compliant platform  She agrees to proceed  Patient agrees to participate in a virtual check in via telephone or video visit instead of presenting to the office to address urgent/immediate medical needs  Patient is aware this is a billable service  After connecting through [facility], the patient was identified by name and date of birth  Kirill Cali was informed that this was a telemedicine visit and that the exam was being conducted confidentially over secure lines  My office door was closed  No one else was in the room  Kirill Cali acknowledged consent and understanding of privacy and security of the telemedicine visit  I informed the patient that I have reviewed her record in Epic and presented the opportunity for her to ask any questions regarding the visit today  The patient agreed to participate  Verification of patient location:  Patient is located in the following state in which I hold an active license: [place]    Subjective:   Kirill Cali is a 79 y o  female who is concerned about COVID-19  Patient's symptoms include chills, nasal congestion, cough and abdominal pain           COVID-19 vaccination status: Fully vaccinated (primary series)    Exposure:   Contact with a person who is under investigation (PUI) for or who is positive for COVID-19 within the last 14 days?: No    Hospitalized recently for fever and/or lower respiratory symptoms?: No      Currently a healthcare worker that is involved in direct patient care?: No      Works in a special setting where the risk of COVID-19 transmission may be high? (this may include long-term care, correctional and [facility] facilities; homeless shelters; assisted-living facilities and group homes ): No      Resident in a special setting where the risk of COVID-19 transmission may be high? (this may include long-term care, correctional and [facility] facilities; homeless shelters; assisted-living facilities and group homes ): No      Same day sick appt- C/o cough, chills, sweats, congestion since Sunday or Monday   Pt usually follows with other provider here  She also c/o "Last Thursday was in the ER because had severe abdominal pain after eating shrimp, they wanted me to contact my primary care physician to get scripts to redo the bloodwork because my liver enzymes were really off the wall"  Pt also states, "Did the home COVID test Saturday and did it again today and was negative both times"  Admits still has abd pain "still hurts a little bit and don't feel like eating- lost about 12 pounds because don't feel like eating"   "had severe diarrhea on Thursday before I went to the ER- that's gone"  Admits cholecystectomy 2008- "and had a gallstone stuck in bile duct in 2012- Dr Connie Troncoso removed it going down my throat" per pt; has not seen GI since her colonoscopy in 2017 per chart review by me today  Pt follows with other provider here and her regular f/u visit is tomorrow- she had been advised at ER visit 05/12 to see PCP within 3 days    5/12/2022 - 5/13/2022 (5 hours)  [address] [place] [address] Emergency Department  CT ABDOMEN AND PELVIS WITHOUT IV CONTRAST  INDICATION:   Abdominal pain, acute, nonlocalized  Upper abdominal pain, nausea, diaphoresis  COMPARISON:  None    IMPRESSION:  Moderate-sized hiatal hernia  Hepatic steatosis  Colonic diverticulosis  ED Course  ED Course as of 05/14/22 0727  Thu May 12, 2022  2319 CBC and differential(!)  2324 Procedure Note: EKG  Date/Time: 05/12/22 9:32 PM   Interpreted by: Zach David  Indications / Diagnosis: upper abdominal pain  ECG reviewed by me, the ED Provider: yes   The EKG demonstrates:  Rhythm: rate 75, normal sinus  Intervals: normal intervals  Axis: normal axis  QRS/Blocks: RBBB  ST Changes: No acute ST Changes, no STD/MARIA GUADALUPE   2359 hs TnI 0hr: 5  Fri May 13, 2022  0026 Patient re-evaluated  Pain has completely resolved, no longer tender in the upper abdomen on exam  I updated patient on results including mildly elevated LFTs and incidental findings of hepatic steatosis and hiatal hernia on CT  Advised her to follow up with her PCP regarding these findings  Aware that we are awaiting delta troponin at this time prior to discharge  Clinical Impressions  Pain of upper abdomen  Diarrhea, unspecified type  Transaminitis  Hiatal hernia  Hepatic steatosis          Lab Results   Component Value Date    SARSCOV2 Negative 11/26/2021     Past Medical History:   Diagnosis Date    Anxiety     Carcinoma ([address] )     carcinoma of the skin    Chronic pain disorder     Right ankle fracture    Depression     Diabetes mellitus (HCC)     Type 2    Diverticulosis     GERD (gastroesophageal reflux disease)     Heart murmur     mitral valve insufficiency    Hypertension     Tenosynovitis, de Quervain     Vertigo     last assessed 8/7/2012     Past Surgical History:   Procedure Laterality Date    CHOLECYSTECTOMY      COLONOSCOPY      COLPOSCOPY W/ BIOPSY / CURETTAGE      cervix   last assessed 8/11/1998    ENDOMETRIAL BIOPSY      onset 4/13/2011    FOOT SURGERY      R foot and ankle fracture, fused with screws    HYSTEROSCOPY W/ POLYPECTOMY      LA COLONOSCOPY FLX DX W/COLLJ SPEC WHEN PFRMD N/A 6/20/2017    Procedure: COLONOSCOPY;  Surgeon: Eloise Magdaleno MD; Location: [facility]; Service: General    TONSILLECTOMY       Current Outpatient Medications   Medication Sig Dispense Refill    azithromycin (ZITHROMAX) 250 mg tablet Take 2 tablets today then 1 tablet daily x 4 days 6 tablet 0    Bimatoprost (LUMIGAN OP) Apply 1 drop to eye daily at bedtime      cetirizine (ZyrTEC) 10 mg tablet Take 10 mg by mouth daily      cholecalciferol (VITAMIN D3) 400 units tablet Take 400 Units by mouth daily      cyanocobalamin (VITAMIN B-12) 100 mcg tablet Take by mouth daily      diclofenac sodium (VOLTAREN) 1 % Place on the skin daily      famotidine (PEPCID) 20 mg tablet Take 20 mg by mouth daily as needed       fluticasone (FLONASE) 50 mcg/act nasal spray 2 sprays into each nostril daily 48 g 1    hydrochlorothiazide (HYDRODIURIL) 25 mg tablet Take 1 tablet by mouth once daily 90 tablet 0    Januvia 50 MG tablet TAKE 1 TABLET DAILY 90 tablet 1    losartan (COZAAR) 100 MG tablet Take 1 tablet by mouth once daily 90 tablet 0    Multiple Vitamins-Minerals (MULTIVITAMIN ADULT PO) Take by mouth      sertraline (ZOLOFT) 50 mg tablet Take 1 tablet by mouth once daily 90 tablet 1    verapamil (CALAN-SR) 120 mg CR tablet Take 1 tablet (120 mg total) by mouth daily at bedtime 90 tablet 1     No current facility-administered medications for this visit  No Known Allergies    Review of Systems   Constitutional: Positive for chills  HENT: Positive for congestion  Respiratory: Positive for cough  Gastrointestinal: Positive for abdominal pain  Objective: There were no vitals filed for this visit  Physical Exam  Constitutional:       General: She is not in acute distress  Appearance: She is not ill-appearing, toxic-appearing or diaphoretic  HENT:      Head: Normocephalic and atraumatic        Nose: Nose normal       Mouth/Throat:      Mouth: Mucous membranes are moist    Eyes:      Conjunctiva/sclera: Conjunctivae normal    Pulmonary:      Effort: Pulmonary effort is normal    Skin:     Coloration: Skin is not pale  Comments: No jaundice appreciable by video    Neurological:      Mental Status: She is alert and oriented to person, place, and time  Psychiatric:         Mood and Affect: Mood normal          VIRTUAL VISIT DISCLAIMER    Martin Calderonmarlon verbally agrees to participate in [employer]  Pt is aware that [employer] could be limited without vital signs or the ability to perform a full hands-on physical Edger Debar understands she or the provider may request at any time to terminate the video visit and request the patient to seek care or treatment in person

## 2022-05-20 ENCOUNTER — APPOINTMENT (OUTPATIENT)
Dept: LAB | Facility: CLINIC | Age: 67
End: 2022-05-20
Payer: MEDICARE

## 2022-05-20 PROCEDURE — 87205 SMEAR GRAM STAIN: CPT

## 2022-05-21 LAB
CAMPYLOBACTER DNA SPEC NAA+PROBE: NORMAL
SALMONELLA DNA SPEC QL NAA+PROBE: NORMAL
SHIGA TOXIN STX GENE SPEC NAA+PROBE: NORMAL
SHIGELLA DNA SPEC QL NAA+PROBE: NORMAL
WBC STL QL MICRO: NORMAL

## 2022-05-23 ENCOUNTER — OFFICE VISIT (OUTPATIENT)
Dept: FAMILY MEDICINE CLINIC | Facility: CLINIC | Age: 67
End: 2022-05-23
Payer: MEDICARE

## 2022-05-23 VITALS
TEMPERATURE: 98.6 F | WEIGHT: 201 LBS | OXYGEN SATURATION: 97 % | HEART RATE: 60 BPM | SYSTOLIC BLOOD PRESSURE: 126 MMHG | BODY MASS INDEX: 33.49 KG/M2 | DIASTOLIC BLOOD PRESSURE: 74 MMHG | HEIGHT: 65 IN

## 2022-05-23 DIAGNOSIS — F32.A DEPRESSION, UNSPECIFIED DEPRESSION TYPE: ICD-10-CM

## 2022-05-23 DIAGNOSIS — J00 COMMON COLD: ICD-10-CM

## 2022-05-23 DIAGNOSIS — R79.82 ELEVATED C-REACTIVE PROTEIN (CRP): ICD-10-CM

## 2022-05-23 DIAGNOSIS — R74.8 ALKALINE PHOSPHATASE ELEVATION: ICD-10-CM

## 2022-05-23 DIAGNOSIS — E83.52 HYPERCALCEMIA: ICD-10-CM

## 2022-05-23 DIAGNOSIS — Z12.11 SCREENING FOR COLON CANCER: ICD-10-CM

## 2022-05-23 DIAGNOSIS — K44.9 HIATAL HERNIA: ICD-10-CM

## 2022-05-23 DIAGNOSIS — R10.13 EPIGASTRIC PAIN: Primary | ICD-10-CM

## 2022-05-23 PROCEDURE — 99214 OFFICE O/P EST MOD 30 MIN: CPT | Performed by: PHYSICIAN ASSISTANT

## 2022-05-23 NOTE — PROGRESS NOTES
Routine Follow-up    Mark Bolds 79 y o  female   Date:  5/23/2022      Assessment and Plan:    Tati Bran was seen today for follow-up, cough, cold like symptoms and fatigue  Diagnoses and all orders for this visit:    Epigastric pain  Comments:  improved/resolved;LFTs and WBC normalized; ? related to food borne illness; completing zpak; continue pepcid prn; CT reviewed from ER     Depression, unspecified depression type  -     sertraline (ZOLOFT) 50 mg tablet; Take 1 tablet (50 mg total) by mouth in the morning  Screening for colon cancer  Comments:  overdue for follow up with Dr Vianney Herrera  Orders:  -     Ambulatory Referral to General Surgery; Future    Hypercalcemia  -     Calcium, ionized; Future  -     PTH, intact; Future  -     Vitamin D 25 hydroxy; Future    Alkaline phosphatase elevation  Comments:  mild, nontrending   Orders:  -     Gamma GT; Future    Elevated C-reactive protein (CRP)  Comments:  continue to monitor, understands this a nonspecific marker; stool studies negative, no bloody stools  Orders:  -     C-reactive protein; Future    Hiatal hernia  Comments:  continue pepcid and avoid food triggers   Orders:  -     Ambulatory Referral to General Surgery; Future    Common cold  Comments:  supportive care, lungs clear          HPI:  Chief Complaint   Patient presents with    Follow-up     E/R follow up, 5/12/2022 [facility]   Patient still bouts of abdominal pain with sweating   Cough    Cold Like Symptoms     Congestion, sinus pressure, headache    Fatigue     HPI   Patient is a 78 yo female who presents for ER follow up  Patient was seen in ER on 5/12 for crampy upper abd pain, nausea, vomiting, diarrhea with associated diaphoresis  This started after eating a [place] salad [place] the [place], felt related to food poisoning  Patient had a CT showing hiatal hernia, fatty liver and diverticulosis  EKG unremarkable  Her labs showed elevated LFTs  She was seen via telemedicine by other provider last week  She was given zpak due to concern for food borne illness  She had clear chest xray  Her hepatitis panel was negative and stool negative for bacteria/WBCs  Upon repeat labs her LFTs and WBC normalized  Since last week, her abd pain has improved  She has not had any bouts in 2 days  She takes pepcid intermittently depending on what she is eating  No vomiting or diarrhea  During all of this, she also developed cold symptoms  No fevers  ROS: Review of Systems   Constitutional: Positive for fatigue  Negative for fever  HENT: Positive for congestion  Negative for ear pain and sore throat  Respiratory: Positive for cough  Negative for chest tightness, shortness of breath and wheezing  Cardiovascular: Negative  Gastrointestinal: Negative for blood in stool, diarrhea, nausea and vomiting  Abdominal pain: improved  Genitourinary: Negative  Neurological: Negative          Past Medical History:   Diagnosis Date    Anxiety     Carcinoma ([facility] )     carcinoma of the skin    Chronic pain disorder     Right ankle fracture    Depression     Diabetes mellitus (HCC)     Type 2    Diverticulosis     GERD (gastroesophageal reflux disease)     Heart murmur     mitral valve insufficiency    Hypertension     Tenosynovitis, de Quervain     Vertigo     last assessed 8/7/2012     Patient Active Problem List   Diagnosis    Hypertension    Depression, recurrent ([facility] )    Annual physical exam    Alkaline phosphatase elevation    Chronic pain of right ankle    De Quervain's tenosynovitis, left    Diverticulosis    DMII [facility]diabetes mellitus, type 2) ([facility])    Elevated bilirubin    Esophageal reflux    Fracture of right heel    Left ventricular hypertrophy    Seasonal allergic rhinitis    Secondary mitral valve insufficiency    Uterovaginal prolapse    History of cholecystectomy    Onychomycosis    Hyperparathyroidism ([facility] )       Past Surgical History:   Procedure Laterality Date    CHOLECYSTECTOMY  COLONOSCOPY      COLPOSCOPY W/ BIOPSY / CURETTAGE      cervix  last assessed 8/11/1998    ENDOMETRIAL BIOPSY      onset 4/13/2011    FOOT SURGERY      R foot and ankle fracture, fused with screws    HYSTEROSCOPY W/ POLYPECTOMY      DC COLONOSCOPY FLX DX W/COLLJ SPEC WHEN PFRMD N/A 6/20/2017    Procedure: COLONOSCOPY;  Surgeon: Rohan Scales MD;  Location: [facility]; Service: General    TONSILLECTOMY         Social History     Socioeconomic History    Marital status:       Spouse name: None    Number of children: None    Years of education: None    Highest education level: None   Occupational History    None   Tobacco Use    Smoking status: Passive Smoke Exposure - Never Smoker    Smokeless tobacco: Never Used    Tobacco comment: as per [employer] second hand  smoke exosure    quit june 2016   Vaping Use    Vaping Use: Never used   Substance and Sexual Activity    Alcohol use: No    Drug use: No    Sexual activity: Not Currently     Partners: Male   Other Topics Concern    None   Social History Narrative    Caffeine Use    Uses safety equipment- seatbelts     Social Determinants of Health     Financial Resource Strain: Not on file   Food Insecurity: Not on file   Transportation Needs: Not on file   Physical Activity: Not on file   Stress: Not on file   Social Connections: Not on file   Intimate Partner Violence: Not on file   Housing Stability: Not on file       Family History   Problem Relation Age of Onset    Breast cancer Mother         Breast surgery mastectomy    Dementia Mother     Colon cancer Father     Liver cancer Father        No Known Allergies      Current Outpatient Medications:     Bimatoprost (LUMIGAN OP), Apply 1 drop to eye daily at bedtime, Disp: , Rfl:     cetirizine (ZyrTEC) 10 mg tablet, Take 10 mg by mouth daily, Disp: , Rfl:     cholecalciferol (VITAMIN D3) 400 units tablet, Take 400 Units by mouth daily, Disp: , Rfl:     cyanocobalamin (VITAMIN B-12) 100 mcg tablet, Take by mouth daily, Disp: , Rfl:     diclofenac sodium (VOLTAREN) 1 %, Place on the skin daily, Disp: , Rfl:     famotidine (PEPCID) 20 mg tablet, Take 20 mg by mouth daily as needed , Disp: , Rfl:     fluticasone (FLONASE) 50 mcg/act nasal spray, 2 sprays into each nostril daily, Disp: 48 g, Rfl: 1    hydrochlorothiazide (HYDRODIURIL) 25 mg tablet, Take 1 tablet by mouth once daily, Disp: 90 tablet, Rfl: 0    Januvia 50 MG tablet, TAKE 1 TABLET DAILY, Disp: 90 tablet, Rfl: 1    losartan (COZAAR) 100 MG tablet, Take 1 tablet by mouth once daily, Disp: 90 tablet, Rfl: 0    Multiple Vitamins-Minerals (MULTIVITAMIN ADULT PO), Take by mouth, Disp: , Rfl:     sertraline (ZOLOFT) 50 mg tablet, Take 1 tablet (50 mg total) by mouth in the morning , Disp: 90 tablet, Rfl: 1    verapamil (CALAN-SR) 120 mg CR tablet, Take 1 tablet (120 mg total) by mouth daily at bedtime, Disp: 90 tablet, Rfl: 1      Physical Exam:  /74 (BP Location: Left arm, Patient Position: Sitting, Cuff Size: Standard)   Pulse 60   Temp 98 6 °F (37 °C) (Tympanic)   Ht 5' 5" (1 651 m)   Wt 91 2 kg (201 lb)   LMP  (LMP Unknown)   SpO2 97%   BMI 33 45 kg/m²     Physical Exam  Constitutional:       General: She is not in acute distress  Appearance: Normal appearance  HENT:      Head: Normocephalic and atraumatic  Right Ear: Tympanic membrane, ear canal and external ear normal       Left Ear: Tympanic membrane, ear canal and external ear normal       Nose: Congestion present  Mouth/Throat:      Mouth: Mucous membranes are moist    Eyes:      Conjunctiva/sclera: Conjunctivae normal       Pupils: Pupils are equal, round, and reactive to light  Cardiovascular:      Rate and Rhythm: Normal rate and regular rhythm  Heart sounds: No murmur heard  Pulmonary:      Effort: Pulmonary effort is normal  No respiratory distress  Breath sounds: Normal breath sounds  No wheezing     Abdominal: General: Bowel sounds are normal  There is no distension  Palpations: Abdomen is soft  Tenderness: There is no abdominal tenderness  Musculoskeletal:         General: No deformity  Skin:     General: Skin is warm and dry  Coloration: Skin is not jaundiced or pale  Neurological:      General: No focal deficit present  Mental Status: She is alert and oriented to person, place, and time     Psychiatric:         Mood and Affect: Mood normal          Behavior: Behavior normal            Labs:  Lab Results   Component Value Date    WBC 4 95 05/18/2022    HGB 12 4 05/18/2022    HCT 38 3 05/18/2022    MCV 96 05/18/2022     05/18/2022     Lab Results   Component Value Date     09/03/2015    K 3 5 05/18/2022     05/18/2022    CO2 31 05/18/2022    ANIONGAP 7 09/03/2015    BUN 14 05/18/2022    CREATININE 0 74 05/18/2022    GLUCOSE 110 09/03/2015    GLUF 133 (H) 03/21/2022    CALCIUM 9 9 05/18/2022    CORRECTEDCA 10 5 (H) 05/18/2022    AST 23 05/18/2022    ALT 59 05/18/2022    ALKPHOS 145 (H) 05/18/2022    PROT 7 4 09/03/2015    BILITOT 0 90 09/03/2015    EGFR 84 05/18/2022

## 2022-05-23 NOTE — PROGRESS NOTES
Patient's shoes and socks removed  Right Foot/Ankle   Right Foot Inspection  Skin Exam: skin normal and skin intact  No dry skin, no warmth, no callus, no erythema, no maceration, no abnormal color, no pre-ulcer, no ulcer and no callus  Sensory   Monofilament testing: intact    Vascular  The right DP pulse is 2+  The right PT pulse is 2+  Left Foot/Ankle  Left Foot Inspection  Skin Exam: skin normal and skin intact  No dry skin, no warmth, no erythema, no maceration, normal color, no pre-ulcer, no ulcer and no callus  Sensory   Monofilament testing: intact    Vascular  The left DP pulse is 2+  The left PT pulse is 2+       Assign Risk Category  No deformity present  No loss of protective sensation  No weak pulses  Risk: 0

## 2022-06-03 ENCOUNTER — TELEPHONE (OUTPATIENT)
Dept: FAMILY MEDICINE CLINIC | Facility: CLINIC | Age: 67
End: 2022-06-03

## 2022-06-03 NOTE — TELEPHONE ENCOUNTER
Lyle Zayas from [facility] left msg that Pt has an appt on 6/6  Requesting a R Diagnostic Mammo script and R Limited Ultrasound  I do not see these orders in chart         Fax to [phone]

## 2022-07-11 ENCOUNTER — OFFICE VISIT (OUTPATIENT)
Dept: URGENT CARE | Facility: CLINIC | Age: 67
End: 2022-07-11
Payer: MEDICARE

## 2022-07-11 VITALS
HEART RATE: 68 BPM | BODY MASS INDEX: 33.45 KG/M2 | RESPIRATION RATE: 18 BRPM | OXYGEN SATURATION: 98 % | WEIGHT: 201 LBS | TEMPERATURE: 98.5 F

## 2022-07-11 DIAGNOSIS — J04.0 ACUTE LARYNGITIS: ICD-10-CM

## 2022-07-11 DIAGNOSIS — J01.40 ACUTE NON-RECURRENT PANSINUSITIS: Primary | ICD-10-CM

## 2022-07-11 PROCEDURE — 99213 OFFICE O/P EST LOW 20 MIN: CPT

## 2022-07-11 PROCEDURE — G0463 HOSPITAL OUTPT CLINIC VISIT: HCPCS

## 2022-07-11 RX ORDER — AMOXICILLIN AND CLAVULANATE POTASSIUM 875; 125 MG/1; MG/1
1 TABLET, FILM COATED ORAL EVERY 12 HOURS SCHEDULED
Qty: 20 TABLET | Refills: 0 | Status: SHIPPED | OUTPATIENT
Start: 2022-07-11 | End: 2022-07-21

## 2022-07-11 RX ORDER — PREDNISONE 10 MG/1
TABLET ORAL
COMMUNITY
Start: 2022-06-10 | End: 2022-07-25

## 2022-07-11 NOTE — PATIENT INSTRUCTIONS
Sinusitis   - take 1 tablet of amox/clavulonate 2x a day for the next 10 days  - You have been given an antibiotic, which a common side effect is diarrhea  Eat yogurt, fresh fruits and veggies, increase daily fiber intake, take probiotics, or try kombucha    - Take Vitamin D3 200IU daily, Vitamin C, and zinc  Rest and drink electrolyte rich fluids  Tylenol and ibuprofen as needed for fevers and aches following package dosing instructions  Chicken noodle soup  - Sore throat: Throat lozenges and/or salt water gurgles  - Congestion relief with mucinex 600mg 1 tablet every 12 hours  You can use afrin or flonase for nasal congestion as directed on their packaging  Warm or cool air mist humidifiers    - Nighttime cough relief with Mucinex DM or NyQuil   - Go to the ED if you have trouble breathing or shortness of breath at rest, chest pain or pressure that lasts longer than 5 minutes, become confused or hard to wake, your lips or face are blue, you have a fever of 104°F (40°C) or higher   - Follow up with Family doctor as needed, however your symptoms may persists for 2-3 weeks from onset  Laryngitis  - Voice rest  - Follow up with family doctor in the next 3 weeks if symptoms continue to persist    WHAT YOU NEED TO KNOW:   Sinusitis is inflammation or infection of your sinuses  Sinusitis is most often caused by a virus  Acute sinusitis may last up to 12 weeks  Chronic sinusitis lasts longer than 12 weeks  DISCHARGE INSTRUCTIONS:   Go to the emergency department if:   -You have trouble breathing or wheezing that is getting worse   -You have a stiff neck, a fever, or a bad headache    -You cannot open your eye    -Your eyeball bulges out or you cannot move your eye    -You are more sleepy than normal, or you notice changes in your ability to think, move, or talk  -You have swelling of your forehead or scalp      Call your doctor if:   -You have vision changes, such as double vision   -Your eye and eyelid are red, swollen, and painful    -Your symptoms do not improve or go away after 10 days   -You have nausea and are vomiting   -Your nose is bleeding   -You have questions or concerns about your condition or care  Medicines: Your symptoms may go away on their own  Your healthcare provider may recommend watchful waiting for up to 10 days before starting antibiotics  You may need any of the following:  -Acetaminophen  decreases pain and fever  Read the labels of all other medicines you are using to see if they also contain acetaminophen, or ask your doctor or pharmacist  Acetaminophen can cause liver damage if not taken correctly  Do not use more than 4 grams (4,000 milligrams) total of acetaminophen in one day  -NSAIDs , such as ibuprofen or naproxen, help decrease swelling, pain, and fever  This medicine is available with or without a doctor's order  NSAIDs can cause stomach bleeding or kidney problems in certain people  If you take blood thinner medicine, always ask your healthcare provider if NSAIDs are safe for you  Always read the medicine label and follow directions   -Nasal steroid sprays: Flonase- may help decrease inflammation in your nose and sinuses  -Decongestants: Mucinex - help reduce swelling and drain mucus in the nose and sinuses  They may help you breathe easier    -Antihistamines: Claritin, Allegra, Zyrtec, Benadryl - help dry mucus in the nose and relieve sneezing    -Take your medicine as directed  Contact your healthcare provider if you think your medicine is not helping or if you have side effects  Tell him or her if you are allergic to any medicine  Keep a list of the medicines, vitamins, and herbs you take  Include the amounts, and when and why you take them  Bring the list or the pill bottles to follow-up visits  Carry your medicine list with you in case of an emergency  Self-care:   -Rinse your sinuses as directed    Use a sinus rinse device to rinse your nasal passages with a saline (salt water) solution or distilled water  Do not use tap water  This will help thin the mucus in your nose and rinse away pollen and dirt  It will also help reduce swelling so you can breathe normally   -Use a humidifier  to increase air moisture in your home  This may make it easier for you to breathe and help decrease your cough  -Sleep with your head elevated  Place an extra pillow under your head before you go to sleep to help your sinuses drain    -Drink liquids as directed  Ask your healthcare provider how much liquid to drink each day and which liquids are best for you  Liquids will thin the mucus in your nose and help it drain  Avoid drinks that contain alcohol or caffeine    -Do not smoke, and avoid secondhand smoke  Nicotine and other chemicals in cigarettes and cigars can make your symptoms worse  Ask your healthcare provider for information if you currently smoke and need help to quit  E-cigarettes or smokeless tobacco still contain nicotine  Talk to your healthcare provider before you use these products  Prevent the spread of germs:   -Wash your hands often with soap and water for 20 seconds

## 2022-07-18 ENCOUNTER — APPOINTMENT (OUTPATIENT)
Dept: LAB | Facility: CLINIC | Age: 67
End: 2022-07-18
Payer: MEDICARE

## 2022-07-18 DIAGNOSIS — E83.52 HYPERCALCEMIA: ICD-10-CM

## 2022-07-18 DIAGNOSIS — R74.8 ALKALINE PHOSPHATASE ELEVATION: ICD-10-CM

## 2022-07-18 DIAGNOSIS — R79.82 ELEVATED C-REACTIVE PROTEIN (CRP): ICD-10-CM

## 2022-07-18 LAB
25(OH)D3 SERPL-MCNC: 49.7 NG/ML (ref 30–100)
CA-I BLD-SCNC: 1.35 MMOL/L (ref 1.12–1.32)
CRP SERPL QL: <3 MG/L
GGT SERPL-CCNC: 36 U/L (ref 5–85)
PTH-INTACT SERPL-MCNC: 85.9 PG/ML (ref 18.4–80.1)

## 2022-07-18 PROCEDURE — 82977 ASSAY OF GGT: CPT

## 2022-07-18 PROCEDURE — 82306 VITAMIN D 25 HYDROXY: CPT

## 2022-07-18 PROCEDURE — 36415 COLL VENOUS BLD VENIPUNCTURE: CPT

## 2022-07-18 PROCEDURE — 83970 ASSAY OF PARATHORMONE: CPT

## 2022-07-18 PROCEDURE — 82330 ASSAY OF CALCIUM: CPT

## 2022-07-18 PROCEDURE — 86140 C-REACTIVE PROTEIN: CPT

## 2022-07-25 ENCOUNTER — OFFICE VISIT (OUTPATIENT)
Dept: FAMILY MEDICINE CLINIC | Facility: CLINIC | Age: 67
End: 2022-07-25
Payer: MEDICARE

## 2022-07-25 VITALS
HEIGHT: 65 IN | WEIGHT: 203 LBS | DIASTOLIC BLOOD PRESSURE: 80 MMHG | SYSTOLIC BLOOD PRESSURE: 134 MMHG | OXYGEN SATURATION: 98 % | TEMPERATURE: 98.2 F | BODY MASS INDEX: 33.82 KG/M2 | HEART RATE: 69 BPM

## 2022-07-25 DIAGNOSIS — J30.1 ALLERGIC RHINITIS DUE TO POLLEN, UNSPECIFIED SEASONALITY: ICD-10-CM

## 2022-07-25 DIAGNOSIS — J01.41 ACUTE RECURRENT PANSINUSITIS: Primary | ICD-10-CM

## 2022-07-25 PROCEDURE — 99213 OFFICE O/P EST LOW 20 MIN: CPT | Performed by: PHYSICIAN ASSISTANT

## 2022-07-25 RX ORDER — DOXYCYCLINE HYCLATE 100 MG/1
100 CAPSULE ORAL EVERY 12 HOURS SCHEDULED
Qty: 14 CAPSULE | Refills: 0 | Status: SHIPPED | OUTPATIENT
Start: 2022-07-25 | End: 2022-08-01

## 2022-07-25 RX ORDER — AZELASTINE 1 MG/ML
1 SPRAY, METERED NASAL 2 TIMES DAILY
Qty: 30 ML | Refills: 0 | Status: SHIPPED | OUTPATIENT
Start: 2022-07-25

## 2022-07-25 NOTE — PROGRESS NOTES
Assessment/Plan:      Diagnoses and all orders for this visit:    Acute recurrent pansinusitis  -     doxycycline hyclate (VIBRAMYCIN) 100 mg capsule; Take 1 capsule (100 mg total) by mouth every 12 (twelve) hours for 7 days  -     azelastine (ASTELIN) 0 1 % nasal spray; 1 spray into each nostril 2 (two) times a day Use in each nostril as directed  - she is made aware to take abx with food and that she is at increased risk of sunburn due to photosensitivity, avoid sun exposure, use sunscreen     Allergic rhinitis due to pollen, unspecified seasonality  -     azelastine (ASTELIN) 0 1 % nasal spray; 1 spray into each nostril 2 (two) times a day Use in each nostril as directed  - she will switch flonase to astelin and monitor over next few days, if no improvement will start antibiotic   - she is on daily antihistamine, switching to claritin did not help so if back on zyrtec           Subjective:     Patient ID: Nick Hunter is a 79 y o  female  Chief Complaint   Patient presents with    Follow-up     At home COVID test negative     Nasal Congestion     Sinus pressure  Did see urgent care on 7/11  Was given amoxicillin-clavulanate   Cough    Laryngitis     Will come and go but was more on July 10       HPI  Patient is a 78 yo female with PMH of allergic rhinitis who presents with persistent congestion x several weeks  She has frontal and maxillary sinus pain and pressure  She felt awful yesterday and this morning  She has been on her flonase and zyrtec  She recently tried switching to claritin to change it up and did not notice a change  She was given a course of augmentin about 2 weeks ago from urgent care  She feels she was able to clear up some yellow drainage while on it but symptoms have persisted  Her voice gets hoarse off and on  She was taking mucinex at times  No difficulty breathing  She will cough when gets tickle in her throat  No fevers   She has noticed congestion worsens when outside at outdoor [employer] she has been to over the summer  Review of Systems   Constitutional: Positive for fatigue  Negative for fever  HENT: Positive for congestion, postnasal drip, rhinorrhea, sinus pressure, sinus pain and voice change  Negative for ear discharge, ear pain and trouble swallowing  Respiratory: Positive for cough (when gets tickle in throat, has cleared up yellow drainage)  Negative for chest tightness, shortness of breath and wheezing  Cardiovascular: Negative  Neurological: Positive for headaches (frontal)           Current Outpatient Medications:     azelastine (ASTELIN) 0 1 % nasal spray, 1 spray into each nostril 2 (two) times a day Use in each nostril as directed, Disp: 30 mL, Rfl: 0    Bimatoprost (LUMIGAN OP), Apply 1 drop to eye daily at bedtime, Disp: , Rfl:     cetirizine (ZyrTEC) 10 mg tablet, Take 10 mg by mouth daily, Disp: , Rfl:     cholecalciferol (VITAMIN D3) 400 units tablet, Take 400 Units by mouth daily, Disp: , Rfl:     cyanocobalamin (VITAMIN B-12) 100 mcg tablet, Take by mouth daily, Disp: , Rfl:     diclofenac sodium (VOLTAREN) 1 %, Place on the skin daily, Disp: , Rfl:     doxycycline hyclate (VIBRAMYCIN) 100 mg capsule, Take 1 capsule (100 mg total) by mouth every 12 (twelve) hours for 7 days, Disp: 14 capsule, Rfl: 0    famotidine (PEPCID) 20 mg tablet, Take 20 mg by mouth daily as needed , Disp: , Rfl:     fluticasone (FLONASE) 50 mcg/act nasal spray, 2 sprays into each nostril daily, Disp: 48 g, Rfl: 1    hydrochlorothiazide (HYDRODIURIL) 25 mg tablet, Take 1 tablet by mouth once daily, Disp: 90 tablet, Rfl: 0    Januvia 50 MG tablet, TAKE 1 TABLET DAILY, Disp: 90 tablet, Rfl: 1    losartan (COZAAR) 100 MG tablet, Take 1 tablet by mouth once daily, Disp: 90 tablet, Rfl: 0    Multiple Vitamins-Minerals (MULTIVITAMIN ADULT PO), Take by mouth, Disp: , Rfl:     sertraline (ZOLOFT) 50 mg tablet, Take 1 tablet (50 mg total) by mouth in the morning , Disp: 90 tablet, Rfl: 1    verapamil (CALAN-SR) 120 mg CR tablet, TAKE 1 TABLET BY MOUTH ONCE DAILY AT BEDTIME, Disp: 90 tablet, Rfl: 1    Objective:  Vitals:    07/25/22 0959   BP: 134/80   Pulse: 69   Temp: 98 2 °F (36 8 °C)   SpO2: 98%      Physical Exam  Constitutional:       General: She is not in acute distress  HENT:      Head: Normocephalic and atraumatic  Right Ear: Tympanic membrane, ear canal and external ear normal       Left Ear: Tympanic membrane, ear canal and external ear normal       Nose: Mucosal edema and congestion present  Right Turbinates: Swollen  Left Turbinates: Swollen  Right Sinus: Maxillary sinus tenderness and frontal sinus tenderness present  Left Sinus: Maxillary sinus tenderness and frontal sinus tenderness present  Mouth/Throat:      Mouth: Mucous membranes are moist       Pharynx: Oropharynx is clear  No oropharyngeal exudate or posterior oropharyngeal erythema  Eyes:      Conjunctiva/sclera: Conjunctivae normal    Cardiovascular:      Rate and Rhythm: Normal rate and regular rhythm  Heart sounds: No murmur heard  Pulmonary:      Effort: Pulmonary effort is normal  No respiratory distress  Breath sounds: Normal breath sounds  No wheezing  Musculoskeletal:         General: No deformity  Skin:     General: Skin is warm and dry  Coloration: Skin is not pale  Neurological:      General: No focal deficit present  Mental Status: She is alert and oriented to person, place, and time  Mental status is at baseline     Psychiatric:         Mood and Affect: Mood normal          Behavior: Behavior normal

## 2022-08-02 DIAGNOSIS — I10 ESSENTIAL HYPERTENSION: ICD-10-CM

## 2022-08-03 RX ORDER — HYDROCHLOROTHIAZIDE 25 MG/1
TABLET ORAL
Qty: 90 TABLET | Refills: 1 | Status: SHIPPED | OUTPATIENT
Start: 2022-08-03

## 2022-08-03 RX ORDER — LOSARTAN POTASSIUM 100 MG/1
TABLET ORAL
Qty: 90 TABLET | Refills: 1 | Status: SHIPPED | OUTPATIENT
Start: 2022-08-03

## 2022-11-14 ENCOUNTER — APPOINTMENT (OUTPATIENT)
Dept: LAB | Facility: CLINIC | Age: 67
End: 2022-11-14

## 2022-11-14 DIAGNOSIS — E11.9 TYPE 2 DIABETES MELLITUS WITHOUT COMPLICATION, WITHOUT LONG-TERM CURRENT USE OF INSULIN (HCC): ICD-10-CM

## 2022-11-14 DIAGNOSIS — E21.3 HYPERPARATHYROIDISM (HCC): ICD-10-CM

## 2022-11-14 LAB
ALBUMIN SERPL BCP-MCNC: 3.4 G/DL (ref 3.5–5)
ALP SERPL-CCNC: 120 U/L (ref 46–116)
ALT SERPL W P-5'-P-CCNC: 41 U/L (ref 12–78)
ANION GAP SERPL CALCULATED.3IONS-SCNC: 6 MMOL/L (ref 4–13)
AST SERPL W P-5'-P-CCNC: 25 U/L (ref 5–45)
BILIRUB SERPL-MCNC: 1.09 MG/DL (ref 0.2–1)
BUN SERPL-MCNC: 20 MG/DL (ref 5–25)
CALCIUM ALBUM COR SERPL-MCNC: 10.7 MG/DL (ref 8.3–10.1)
CALCIUM SERPL-MCNC: 10.2 MG/DL (ref 8.3–10.1)
CHLORIDE SERPL-SCNC: 107 MMOL/L (ref 96–108)
CO2 SERPL-SCNC: 23 MMOL/L (ref 21–32)
CREAT SERPL-MCNC: 0.75 MG/DL (ref 0.6–1.3)
GFR SERPL CREATININE-BSD FRML MDRD: 82 ML/MIN/1.73SQ M
GLUCOSE P FAST SERPL-MCNC: 147 MG/DL (ref 65–99)
POTASSIUM SERPL-SCNC: 4 MMOL/L (ref 3.5–5.3)
PROT SERPL-MCNC: 7.9 G/DL (ref 6.4–8.4)
SODIUM SERPL-SCNC: 136 MMOL/L (ref 135–147)

## 2022-11-15 LAB
EST. AVERAGE GLUCOSE BLD GHB EST-MCNC: 134 MG/DL
HBA1C MFR BLD: 6.3 %

## 2022-11-27 DIAGNOSIS — F32.A DEPRESSION, UNSPECIFIED DEPRESSION TYPE: ICD-10-CM

## 2022-12-26 DIAGNOSIS — J01.41 ACUTE RECURRENT PANSINUSITIS: ICD-10-CM

## 2022-12-26 DIAGNOSIS — J30.1 ALLERGIC RHINITIS DUE TO POLLEN, UNSPECIFIED SEASONALITY: ICD-10-CM

## 2022-12-26 DIAGNOSIS — I10 ESSENTIAL HYPERTENSION: ICD-10-CM

## 2022-12-27 RX ORDER — AZELASTINE HYDROCHLORIDE 137 UG/1
SPRAY, METERED NASAL
Qty: 30 ML | Refills: 0 | Status: SHIPPED | OUTPATIENT
Start: 2022-12-27

## 2023-01-30 ENCOUNTER — TELEPHONE (OUTPATIENT)
Dept: FAMILY MEDICINE CLINIC | Facility: CLINIC | Age: 68
End: 2023-01-30

## 2023-01-30 NOTE — TELEPHONE ENCOUNTER
Called patient in regards to setting up a follow-up appointment with one of our other providers  Patients needs to follow up on BP in order to continue to receive med refills

## 2023-01-31 ENCOUNTER — TELEPHONE (OUTPATIENT)
Dept: FAMILY MEDICINE CLINIC | Facility: CLINIC | Age: 68
End: 2023-01-31

## 2023-01-31 NOTE — TELEPHONE ENCOUNTER
Left message for pt to call the office to schedule appt for follow up to address her blood pressure and the medication has been filled for 30 days until seen in the office

## 2023-01-31 NOTE — TELEPHONE ENCOUNTER
----- Message from Live Harper MD sent at 1/30/2023 10:11 AM EST -----  Please call patient and have her come in for follow-up  I just refilled her blood pressure medications for 30 days but she has not been seen in over 8 months and her blood pressure has not been addressed for even longer  Thank you

## 2023-01-31 NOTE — TELEPHONE ENCOUNTER
----- Message from Fareed Mesa MD sent at 1/30/2023 10:11 AM EST -----  Please call patient and have her come in for follow-up  I just refilled her blood pressure medications for 30 days but she has not been seen in over 8 months and her blood pressure has not been addressed for even longer  Thank you

## 2023-02-08 ENCOUNTER — TELEPHONE (OUTPATIENT)
Dept: FAMILY MEDICINE CLINIC | Facility: CLINIC | Age: 68
End: 2023-02-08

## 2023-02-14 ENCOUNTER — VBI (OUTPATIENT)
Dept: ADMINISTRATIVE | Facility: OTHER | Age: 68
End: 2023-02-14

## 2023-02-20 ENCOUNTER — RA CDI HCC (OUTPATIENT)
Dept: OTHER | Facility: HOSPITAL | Age: 68
End: 2023-02-20

## 2023-02-20 NOTE — PROGRESS NOTES
[address]  coding opportunities       Chart reviewed, no opportunity found: CHART REVIEWED, NO OPPORTUNITY FOUND        Patients Insurance     Medicare Insurance: Medicare

## 2023-02-24 ENCOUNTER — TELEPHONE (OUTPATIENT)
Dept: ADMINISTRATIVE | Facility: OTHER | Age: 68
End: 2023-02-24

## 2023-02-24 NOTE — TELEPHONE ENCOUNTER
----- Message from Kelby Chamberlain sent at 2/24/2023  9:04 AM EST -----  Regarding: mammogram  02/24/23 9:04 AM    Hello, our patient Shira Flood has had Mammogram completed/performed  Please assist in updating the patient chart by pulling the Care Everywhere (CE) document  The date of service is 11/7/2022       Thank you,  Kelby [facility]

## 2023-02-27 ENCOUNTER — OFFICE VISIT (OUTPATIENT)
Dept: FAMILY MEDICINE CLINIC | Facility: CLINIC | Age: 68
End: 2023-02-27

## 2023-02-27 ENCOUNTER — TELEPHONE (OUTPATIENT)
Dept: ADMINISTRATIVE | Facility: OTHER | Age: 68
End: 2023-02-27

## 2023-02-27 VITALS
TEMPERATURE: 97.8 F | SYSTOLIC BLOOD PRESSURE: 122 MMHG | DIASTOLIC BLOOD PRESSURE: 63 MMHG | WEIGHT: 207.6 LBS | HEART RATE: 67 BPM | BODY MASS INDEX: 34.59 KG/M2 | OXYGEN SATURATION: 96 % | HEIGHT: 65 IN

## 2023-02-27 DIAGNOSIS — I10 ESSENTIAL HYPERTENSION: Primary | ICD-10-CM

## 2023-02-27 DIAGNOSIS — E11.9 TYPE 2 DIABETES MELLITUS WITHOUT COMPLICATION, WITHOUT LONG-TERM CURRENT USE OF INSULIN (HCC): ICD-10-CM

## 2023-02-27 DIAGNOSIS — I10 PRIMARY HYPERTENSION: ICD-10-CM

## 2023-02-27 DIAGNOSIS — Z12.11 SPECIAL SCREENING FOR MALIGNANT NEOPLASMS, COLON: ICD-10-CM

## 2023-02-27 DIAGNOSIS — F33.9 DEPRESSION, RECURRENT (HCC): ICD-10-CM

## 2023-02-27 DIAGNOSIS — E21.3 HYPERPARATHYROIDISM (HCC): ICD-10-CM

## 2023-02-27 PROBLEM — Z00.00 ANNUAL PHYSICAL EXAM: Status: RESOLVED | Noted: 2018-03-08 | Resolved: 2023-02-27

## 2023-02-27 RX ORDER — HYDROCHLOROTHIAZIDE 25 MG/1
25 TABLET ORAL DAILY
Qty: 90 TABLET | Refills: 1 | Status: SHIPPED | OUTPATIENT
Start: 2023-02-27 | End: 2023-05-28

## 2023-02-27 RX ORDER — LOSARTAN POTASSIUM 100 MG/1
100 TABLET ORAL DAILY
Qty: 90 TABLET | Refills: 1 | Status: SHIPPED | OUTPATIENT
Start: 2023-02-27 | End: 2023-05-28

## 2023-02-27 NOTE — ASSESSMENT & PLAN NOTE
Lab Results   Component Value Date    HGBA1C 6 3 (H) 11/14/2022     Controlled  Following with Endo  Continue Januvia

## 2023-02-27 NOTE — TELEPHONE ENCOUNTER
----- Message from Leandra Vaz sent at 2/27/2023  9:07 AM EST -----  Regarding: Mammogram  02/27/23 9:08 AM    Hello, our patient Jodie Leone has had Mammogram completed/performed  Please assist in updating the patient chart by pulling the Care Everywhere (CE) document  The date of service is 11/07/2022       Thank you,  Leandra [facility] AT [facility]

## 2023-02-27 NOTE — PROGRESS NOTES
Name: Antonio Berger      : 1955      MRN: [id number]  Encounter Provider: Xavier Eli MD  Encounter Date: 2023   Encounter department: FAMILY PRACTICE AT [address] [place] [address]  Essential hypertension  -     verapamil (CALAN-SR) 120 mg CR tablet; Take 1 tablet (120 mg total) by mouth daily at bedtime  -     losartan (COZAAR) 100 MG tablet; Take 1 tablet (100 mg total) by mouth daily  -     hydrochlorothiazide (HYDRODIURIL) 25 mg tablet; Take 1 tablet (25 mg total) by mouth daily    2  Depression, recurrent ([facility] [address] )  Assessment & Plan:  Controlled  Continue Zoloft  Refilled today  Orders:  -     sertraline (ZOLOFT) 50 mg tablet; Take 1 tablet (50 mg total) by mouth every morning    3  Primary hypertension  Assessment & Plan:  Controlled  Continue current treatment plan  Refills today  4  Hyperparathyroidism ([facility] )  Assessment & Plan:  Mild hypercalcemia on labs  Following with Endo  5  Type 2 diabetes mellitus without complication, without long-term current use of insulin [facility])  Assessment & Plan:    Lab Results   Component Value Date    HGBA1C 6 3 (H) 2022     Controlled  Following with Endo  Continue Januvia  6  Special screening for malignant neoplasms, colon  Comments:  Last colonoscopy 2017  Repeat in 5 years due to family history  Referral placed today  Orders:  -     Ambulatory referral for colonoscopy; Future             Subjective      Presents to the office for follow-up on chronic conditions  Has history of diabetes, hypertension, hyperparathyroidism and depression  Doing well no acute concerns or complaints from for chronic conditions  All conditions stable on current treatment plan  No issues with medications  To get lab work in November  A1c stable  She follows with endocrinology for diabetes and hyperparathyroidism  Review of Systems   Constitutional: Negative for chills and fever     HENT: Negative for ear pain and sore throat  Eyes: Negative for pain and visual disturbance  Respiratory: Negative for cough and shortness of breath  Cardiovascular: Negative for chest pain and palpitations  Gastrointestinal: Negative for abdominal pain and vomiting  Genitourinary: Negative for dysuria and hematuria  Musculoskeletal: Negative for arthralgias and back pain  Skin: Negative for color change and rash  Neurological: Negative for seizures and syncope  All other systems reviewed and are negative        Current Outpatient Medications on File Prior to Visit   Medication Sig   • Azelastine HCl 137 MCG/SPRAY SOLN USE 1 SPRAY(S) IN EACH NOSTRIL TWICE DAILY AS DIRECTED   • Bimatoprost (LUMIGAN OP) Apply 1 drop to eye daily at bedtime   • cetirizine (ZyrTEC) 10 mg tablet Take 10 mg by mouth daily   • cholecalciferol (VITAMIN D3) 400 units tablet Take 400 Units by mouth daily   • cyanocobalamin (VITAMIN B-12) 100 mcg tablet Take by mouth daily   • famotidine (PEPCID) 20 mg tablet Take 20 mg by mouth daily as needed    • fluticasone (FLONASE) 50 mcg/act nasal spray 2 sprays into each nostril daily   • Januvia 50 MG tablet TAKE 1 TABLET DAILY   • Multiple Vitamins-Minerals (MULTIVITAMIN ADULT PO) Take by mouth   • [DISCONTINUED] hydrochlorothiazide (HYDRODIURIL) 25 mg tablet Take 1 tablet by mouth once daily   • [DISCONTINUED] losartan (COZAAR) 100 MG tablet Take 1 tablet by mouth once daily   • [DISCONTINUED] sertraline (ZOLOFT) 50 mg tablet TAKE 1 TABLET BY MOUTH IN THE MORNING   • [DISCONTINUED] verapamil (CALAN-SR) 120 mg CR tablet TAKE 1 TABLET BY MOUTH ONCE DAILY AT BEDTIME   • diclofenac sodium (VOLTAREN) 1 % Place on the skin daily (Patient not taking: Reported on 2/27/2023)       Objective     /63 (BP Location: Left arm, Patient Position: Sitting, Cuff Size: Standard)   Pulse 67   Temp 97 8 °F (36 6 °C) (Tympanic)   Ht 5' 5" (1 651 m)   Wt 94 2 kg (207 lb 9 6 oz)   LMP  (LMP Unknown)   SpO2 96%   BMI 34 55 kg/m² Physical Exam  Vitals and nursing note reviewed  Constitutional:       General: She is not in acute distress  Appearance: Normal appearance  She is not ill-appearing, toxic-appearing or diaphoretic  Eyes:      General:         Right eye: No discharge  Left eye: No discharge  Extraocular Movements: Extraocular movements intact  Conjunctiva/sclera: Conjunctivae normal    Cardiovascular:      Rate and Rhythm: Normal rate  Pulmonary:      Effort: Pulmonary effort is normal    Musculoskeletal:      Cervical back: Normal range of motion and neck supple  Neurological:      Mental Status: She is alert and oriented to person, place, and time  Psychiatric:         Mood and Affect: Mood normal          Behavior: Behavior normal          Thought Content:  Thought content normal          Judgment: Judgment normal        Ryan Zepeda MD

## 2023-02-28 NOTE — TELEPHONE ENCOUNTER
Upon review of the In Basket request we were able to note that no further action is required  The patient chart is up to date  Any additional questions or concerns should be emailed to the Practice Liaisons via the appropriate education email address, please do not reply via In Basket      Thank you  Ha Cramer

## 2023-02-28 NOTE — TELEPHONE ENCOUNTER
Upon review of the In Basket request we were able to locate, review, and update the patient chart as requested for Mammogram     Any additional questions or concerns should be emailed to the Practice Liaisons via the appropriate education email address, please do not reply via In Basket      Thank you  Adelia Jaime

## 2023-03-20 LAB
LEFT EYE DIABETIC RETINOPATHY: NORMAL
RIGHT EYE DIABETIC RETINOPATHY: NORMAL

## 2023-03-30 ENCOUNTER — TELEPHONE (OUTPATIENT)
Dept: GASTROENTEROLOGY | Facility: CLINIC | Age: 68
End: 2023-03-30

## 2023-03-30 ENCOUNTER — PREP FOR PROCEDURE (OUTPATIENT)
Dept: GASTROENTEROLOGY | Facility: CLINIC | Age: 68
End: 2023-03-30

## 2023-03-30 DIAGNOSIS — Z12.11 SCREENING FOR COLON CANCER: Primary | ICD-10-CM

## 2023-03-30 NOTE — TELEPHONE ENCOUNTER
Scheduled date of colonoscopy (as of today): 5/1/23  Physician performing colonoscopy: Ann Perez   Location of colonoscopy: [facility]

## 2023-03-31 DIAGNOSIS — J30.1 ALLERGIC RHINITIS DUE TO POLLEN, UNSPECIFIED SEASONALITY: ICD-10-CM

## 2023-03-31 RX ORDER — FLUTICASONE PROPIONATE 50 MCG
2 SPRAY, SUSPENSION (ML) NASAL DAILY
Qty: 48 G | Refills: 1 | Status: SHIPPED | OUTPATIENT
Start: 2023-03-31

## 2023-04-03 ENCOUNTER — TELEPHONE (OUTPATIENT)
Dept: GASTROENTEROLOGY | Facility: CLINIC | Age: 68
End: 2023-04-03

## 2023-04-03 NOTE — TELEPHONE ENCOUNTER
Scheduled date of colonoscopy (as of today): 5/8/23  Physician performing colonoscopy: Rosalinda Trevino  Location of colonoscopy: [facility]

## 2023-04-28 ENCOUNTER — NURSE TRIAGE (OUTPATIENT)
Dept: OTHER | Facility: OTHER | Age: 68
End: 2023-04-28

## 2023-04-28 NOTE — TELEPHONE ENCOUNTER
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

## 2023-04-28 NOTE — TELEPHONE ENCOUNTER
"  Reason for Disposition  • Bowel prep for colonoscopy, questions about    Answer Assessment - Initial Assessment Questions  1  DATE/TIME: \"When did you have your colonoscopy? \"       5/8    2  MAIN CONCERN: [place] is your main concern right now? \" \"What questions do you have? \"      Needs bowel prep instructions resent    Protocols used: COLONOSCOPY SYMPTOMS AND QUESTIONS-ADULT-AH    "

## 2023-05-08 ENCOUNTER — HOSPITAL ENCOUNTER (OUTPATIENT)
Dept: GASTROENTEROLOGY | Facility: HOSPITAL | Age: 68
Setting detail: OUTPATIENT SURGERY
Discharge: HOME | End: 2023-05-08

## 2023-05-08 ENCOUNTER — ANESTHESIA EVENT (OUTPATIENT)
Dept: GASTROENTEROLOGY | Facility: HOSPITAL | Age: 68
End: 2023-05-08

## 2023-05-08 ENCOUNTER — ANESTHESIA (OUTPATIENT)
Dept: GASTROENTEROLOGY | Facility: HOSPITAL | Age: 68
End: 2023-05-08

## 2023-05-08 VITALS
WEIGHT: 207 LBS | SYSTOLIC BLOOD PRESSURE: 140 MMHG | OXYGEN SATURATION: 97 % | RESPIRATION RATE: 18 BRPM | HEART RATE: 64 BPM | DIASTOLIC BLOOD PRESSURE: 67 MMHG | TEMPERATURE: 97.3 F | HEIGHT: 65 IN | BODY MASS INDEX: 34.49 KG/M2

## 2023-05-08 DIAGNOSIS — Z12.11 SCREENING FOR COLON CANCER: ICD-10-CM

## 2023-05-08 LAB — GLUCOSE SERPL-MCNC: 117 MG/DL (ref 65–140)

## 2023-05-08 RX ORDER — SODIUM CHLORIDE, SODIUM LACTATE, POTASSIUM CHLORIDE, CALCIUM CHLORIDE 600; 310; 30; 20 MG/100ML; MG/100ML; MG/100ML; MG/100ML
50 INJECTION, SOLUTION INTRAVENOUS CONTINUOUS
Status: DISCONTINUED | OUTPATIENT
Start: 2023-05-08 | End: 2023-05-12 | Stop reason: HOSPADM

## 2023-05-08 RX ORDER — GLYCOPYRROLATE 0.2 MG/ML
INJECTION INTRAMUSCULAR; INTRAVENOUS AS NEEDED
Status: DISCONTINUED | OUTPATIENT
Start: 2023-05-08 | End: 2023-05-08

## 2023-05-08 RX ORDER — PROPOFOL 10 MG/ML
INJECTION, EMULSION INTRAVENOUS AS NEEDED
Status: DISCONTINUED | OUTPATIENT
Start: 2023-05-08 | End: 2023-05-08

## 2023-05-08 RX ORDER — PROPOFOL 10 MG/ML
INJECTION, EMULSION INTRAVENOUS CONTINUOUS PRN
Status: DISCONTINUED | OUTPATIENT
Start: 2023-05-08 | End: 2023-05-08

## 2023-05-08 RX ORDER — SODIUM CHLORIDE, SODIUM LACTATE, POTASSIUM CHLORIDE, CALCIUM CHLORIDE 600; 310; 30; 20 MG/100ML; MG/100ML; MG/100ML; MG/100ML
20 INJECTION, SOLUTION INTRAVENOUS CONTINUOUS
Status: CANCELLED | OUTPATIENT
Start: 2023-05-08

## 2023-05-08 RX ORDER — SODIUM CHLORIDE, SODIUM LACTATE, POTASSIUM CHLORIDE, CALCIUM CHLORIDE 600; 310; 30; 20 MG/100ML; MG/100ML; MG/100ML; MG/100ML
INJECTION, SOLUTION INTRAVENOUS CONTINUOUS PRN
Status: DISCONTINUED | OUTPATIENT
Start: 2023-05-08 | End: 2023-05-08

## 2023-05-08 RX ADMIN — GLYCOPYRROLATE 0.2 MG: 0.2 INJECTION INTRAMUSCULAR; INTRAVENOUS at 10:47

## 2023-05-08 RX ADMIN — SODIUM CHLORIDE, SODIUM LACTATE, POTASSIUM CHLORIDE, AND CALCIUM CHLORIDE: .6; .31; .03; .02 INJECTION, SOLUTION INTRAVENOUS at 10:43

## 2023-05-08 RX ADMIN — SODIUM CHLORIDE, SODIUM LACTATE, POTASSIUM CHLORIDE, AND CALCIUM CHLORIDE 50 ML/HR: .6; .31; .03; .02 INJECTION, SOLUTION INTRAVENOUS at 09:45

## 2023-05-08 RX ADMIN — PROPOFOL 120 MCG/KG/MIN: 10 INJECTION, EMULSION INTRAVENOUS at 10:46

## 2023-05-08 RX ADMIN — PROPOFOL 100 MG: 10 INJECTION, EMULSION INTRAVENOUS at 10:46

## 2023-05-08 NOTE — ANESTHESIA PREPROCEDURE EVALUATION
Procedure:  COLONOSCOPY    Relevant Problems   CARDIO   (+) Hypertension   (+) Secondary mitral valve insufficiency      ENDO   (+) DMII (diabetes mellitus, type 2) (HCC)   (+) Hyperparathyroidism (HCC)      GI/HEPATIC   (+) Esophageal reflux      NEURO/PSYCH   (+) Chronic pain of right ankle   (+) Depression, recurrent (HCC)        Physical Exam    Airway    Mallampati score: II  TM Distance: >3 FB  Neck ROM: full     Dental   No notable dental hx     Cardiovascular      Pulmonary      Other Findings        Anesthesia Plan  ASA Score- 2     Anesthesia Type- IV sedation with anesthesia with ASA Monitors  Additional Monitors:   Airway Plan:           Plan Factors-Exercise tolerance (METS): >4 METS  Chart reviewed  Patient summary reviewed  Patient is not a current smoker  Obstructive sleep apnea risk education given perioperatively  Induction- intravenous  Postoperative Plan-     Informed Consent- Anesthetic plan and risks discussed with patient  I personally reviewed this patient with the CRNA  Discussed and agreed on the Anesthesia Plan with the CRNA  T[place]

## 2023-05-08 NOTE — H&P
History and Physical - [facility]  Jack Antunez 76 y o  female MRN: [id number]                  HPI: Jack Antunez is a 76y o  year old female who presents for colon cancer screening  REVIEW OF SYSTEMS: Per the HPI, and otherwise unremarkable  Historical Information   Past Medical History:   Diagnosis Date   • Anxiety    • Carcinoma ([address] )     carcinoma of the skin   • Chronic pain disorder     Right ankle fracture   • Depression    • Diabetes mellitus (HCC)     Type 2   • Diverticulosis    • GERD (gastroesophageal reflux disease)    • Heart murmur     mitral valve insufficiency   • Hypertension    • Tenosynovitis, de Quervain    • Vertigo     last assessed 8/7/2012     Past Surgical History:   Procedure Laterality Date   • CHOLECYSTECTOMY     • COLONOSCOPY     • COLPOSCOPY W/ BIOPSY / CURETTAGE      cervix  last assessed 8/11/1998   • ENDOMETRIAL BIOPSY      onset 4/13/2011   • FOOT SURGERY      R foot and ankle fracture, fused with screws   • HYSTEROSCOPY W/ POLYPECTOMY     • MT COLONOSCOPY FLX DX W/COLLJ SPEC WHEN PFRMD N/A 6/20/2017    Procedure: COLONOSCOPY;  Surgeon: Joel Macdonald MD;  Location: [facility];   Service: General   • TONSILLECTOMY       Social History   Social History     Substance and Sexual Activity   Alcohol Use Yes   • Alcohol/week: 1 0 standard drink   • Types: 1 Cans of beer per week    Comment: once a week     Social History     Substance and Sexual Activity   Drug Use No     Social History     Tobacco Use   Smoking Status Never   • Passive exposure: Yes   Smokeless Tobacco Never   Tobacco Comments    as per [employer] second hand  smoke exosure    quit june 2016     Family History   Problem Relation Age of Onset   • Breast cancer Mother         Breast surgery mastectomy   • Dementia Mother    • Colon cancer Father    • Liver cancer Father        Meds/Allergies     (Not in a hospital admission)      No Known Allergies    Objective     Blood pressure "151/70, pulse 72, temperature (!) 97 1 °F (36 2 °C), temperature source Temporal, resp  rate 18, height 5' 5\" (1 651 m), weight 93 9 kg (207 lb), SpO2 96 %  PHYSICAL EXAMINATION:    General Appearance:   Alert, cooperative, no distress   HEENT:  Normocephalic, atraumatic, anicteric  Neck supple, symmetrical, trachea midline  Lungs:   Equal chest rise and unlabored breathing, normal effort, no coughing  Cardiovascular:   No visualized JVD  Abdomen:   No abdominal distension  Skin:   No jaundice, rashes, or lesions  Musculoskeletal:   Normal range of motion visualized  Psych:  Normal affect and normal insight  Neuro:  Alert and appropriate  ASSESSMENT/PLAN:  This is a 76y o  year old female here for colonoscopy, and she is stable and optimized for her procedure        "

## 2023-05-08 NOTE — ANESTHESIA POSTPROCEDURE EVALUATION
Post-Op Assessment Note    CV Status:  Stable  Pain Score: 0    Pain management: adequate     Mental Status:  Alert and awake   Hydration Status:  Euvolemic   PONV Controlled:  Controlled   Airway Patency:  Patent      Post Op Vitals Reviewed: Yes      Staff: CRNA         No notable events documented      BP   136/70   Temp  97 2   Pulse  70   Resp   12   SpO2   99

## 2023-05-15 ENCOUNTER — TELEPHONE (OUTPATIENT)
Dept: FAMILY MEDICINE CLINIC | Facility: CLINIC | Age: 68
End: 2023-05-15

## 2023-05-15 NOTE — TELEPHONE ENCOUNTER
Pt called requesting scripts for lab work to do prior to her June 5th appointment  Pt said she normally gets her A1C, Lipid Panel and CMP at 6 month visits

## 2023-05-16 DIAGNOSIS — E11.9 TYPE 2 DIABETES MELLITUS WITHOUT COMPLICATION, WITHOUT LONG-TERM CURRENT USE OF INSULIN (HCC): Primary | ICD-10-CM

## 2023-05-22 ENCOUNTER — APPOINTMENT (OUTPATIENT)
Dept: LAB | Facility: CLINIC | Age: 68
End: 2023-05-22

## 2023-05-22 DIAGNOSIS — E11.9 TYPE 2 DIABETES MELLITUS WITHOUT COMPLICATION, WITHOUT LONG-TERM CURRENT USE OF INSULIN (HCC): ICD-10-CM

## 2023-05-22 LAB
ALBUMIN SERPL BCP-MCNC: 3.3 G/DL (ref 3.5–5)
ALP SERPL-CCNC: 122 U/L (ref 46–116)
ALT SERPL W P-5'-P-CCNC: 36 U/L (ref 12–78)
ANION GAP SERPL CALCULATED.3IONS-SCNC: -1 MMOL/L (ref 4–13)
AST SERPL W P-5'-P-CCNC: 20 U/L (ref 5–45)
BILIRUB SERPL-MCNC: 0.58 MG/DL (ref 0.2–1)
BUN SERPL-MCNC: 15 MG/DL (ref 5–25)
CALCIUM ALBUM COR SERPL-MCNC: 10.2 MG/DL (ref 8.3–10.1)
CALCIUM SERPL-MCNC: 9.6 MG/DL (ref 8.3–10.1)
CHLORIDE SERPL-SCNC: 108 MMOL/L (ref 96–108)
CHOLEST SERPL-MCNC: 154 MG/DL
CO2 SERPL-SCNC: 29 MMOL/L (ref 21–32)
CREAT SERPL-MCNC: 0.75 MG/DL (ref 0.6–1.3)
EST. AVERAGE GLUCOSE BLD GHB EST-MCNC: 146 MG/DL
GFR SERPL CREATININE-BSD FRML MDRD: 82 ML/MIN/1.73SQ M
GLUCOSE P FAST SERPL-MCNC: 138 MG/DL (ref 65–99)
HBA1C MFR BLD: 6.7 %
HDLC SERPL-MCNC: 50 MG/DL
LDLC SERPL CALC-MCNC: 90 MG/DL (ref 0–100)
NONHDLC SERPL-MCNC: 104 MG/DL
POTASSIUM SERPL-SCNC: 4 MMOL/L (ref 3.5–5.3)
PROT SERPL-MCNC: 7.5 G/DL (ref 6.4–8.4)
SODIUM SERPL-SCNC: 136 MMOL/L (ref 135–147)
TRIGL SERPL-MCNC: 72 MG/DL

## 2023-05-30 ENCOUNTER — RA CDI HCC (OUTPATIENT)
Dept: OTHER | Facility: HOSPITAL | Age: 68
End: 2023-05-30

## 2023-06-05 ENCOUNTER — OFFICE VISIT (OUTPATIENT)
Dept: FAMILY MEDICINE CLINIC | Facility: CLINIC | Age: 68
End: 2023-06-05
Payer: MEDICARE

## 2023-06-05 VITALS
DIASTOLIC BLOOD PRESSURE: 80 MMHG | OXYGEN SATURATION: 99 % | SYSTOLIC BLOOD PRESSURE: 110 MMHG | BODY MASS INDEX: 38.86 KG/M2 | TEMPERATURE: 98.9 F | HEIGHT: 61 IN | HEART RATE: 62 BPM | WEIGHT: 205.8 LBS

## 2023-06-05 DIAGNOSIS — I10 PRIMARY HYPERTENSION: ICD-10-CM

## 2023-06-05 DIAGNOSIS — E66.01 OBESITY, MORBID (HCC): ICD-10-CM

## 2023-06-05 DIAGNOSIS — F33.9 DEPRESSION, RECURRENT (HCC): ICD-10-CM

## 2023-06-05 DIAGNOSIS — E11.9 TYPE 2 DIABETES MELLITUS WITHOUT COMPLICATION, WITHOUT LONG-TERM CURRENT USE OF INSULIN (HCC): ICD-10-CM

## 2023-06-05 DIAGNOSIS — Z00.00 MEDICARE ANNUAL WELLNESS VISIT, SUBSEQUENT: Primary | ICD-10-CM

## 2023-06-05 PROCEDURE — G0439 PPPS, SUBSEQ VISIT: HCPCS | Performed by: FAMILY MEDICINE

## 2023-06-05 PROCEDURE — 99214 OFFICE O/P EST MOD 30 MIN: CPT | Performed by: FAMILY MEDICINE

## 2023-06-05 NOTE — PROGRESS NOTES
Patient's shoes and socks removed  Right Foot/Ankle   Right Foot Inspection  Skin Exam: skin normal and skin intact  No dry skin, no warmth, no callus, no erythema, no maceration, no abnormal color, no pre-ulcer, no ulcer and no callus  Sensory   Monofilament testing: diminished    Vascular  The right DP pulse is 2+  The right PT pulse is 2+  Left Foot/Ankle  Left Foot Inspection  Skin Exam: skin normal and skin intact  No dry skin, no warmth, no erythema, no maceration, normal color, no pre-ulcer, no ulcer and no callus  Sensory   Monofilament testing: intact    Vascular  The left DP pulse is 2+  The left PT pulse is 2+       Assign Risk Category  No deformity present  Loss of protective sensation  No weak pulses  Risk: 1          Answers for HPI/ROS submitted by the patient on 6/4/2023  How often during the last year have you found that you were not able to stop drinking once you had started?: 0 - never  How often during the last year have you failed to do what was normally expected from you because of drinking?: 0 - never  How often during the last year have you needed a first drink in the morning to get yourself going after a heavy drinking session?: 0 - never  How often during the last year have you had a feeling of guilt or remorse after drinking?: 0 - never  How often during the last year have you been unable to remember what happened the night before because you had been drinking?: 0 - never  Have you or someone else been injured as a result of your drinking?: 0 - no  Has a relative or friend or a doctor or another health worker been concerned about your drinking or suggested you cut down?: 0 - no  How would you rate your overall health?: good  Compared to last year, how is your physical health?: same  In general, how satisfied are you with your life?: satisfied  Compared to last year, how is your eyesight?: same  Compared to last year, how is your hearing?: same  Compared to last year, how is "your emotional/mental health?: same  How often is anger a problem for you?: never, rarely  How often do you feel unusually tired/fatigued?: often  In the past 7 days, how much pain have you experienced?: some  If you answered \"some\" or \"a lot\", please rate the severity of your pain on a scale of 1 to 10 (1 being the least severe pain and 10 being the most intense pain)  : 3/10  In the past 6 months, have you lost or gained 10 pounds without trying?: No  One or more falls in the last year: No  In the past 6 months, have you accidentally leaked urine?: Yes  Do you have trouble with the stairs inside or outside your home?: Yes  Does your home have working smoke alarms?: Yes  Does your home have a carbon monoxide monitor?: Yes  Which safety hazards (if any) have you experienced in your home? Please select all that apply : none  How would you describe your current diet?  Please select all that apply : Regular  In addition to prescription medications, are you taking any over-the-counter supplements?: Yes  If yes, what supplements are you taking?: B12, D3, Hair, skin & nails  Can you manage your medications?: Yes  Are you currently taking any opioid medications?: No  Can you walk and transfer into and out of your bed and chair?: Yes  Can you dress and groom yourself?: Yes  Can you bathe or shower yourself?: Yes  Can you feed yourself?: Yes  Can you do your laundry/ housekeeping?: Yes  Can you manage your money, pay your bills, and track your expenses?: Yes  Can you make your own meals?: Yes  Can you do your own shopping?: Yes  Within the last 12 months, have you had any hospitalizations or Emergency Department visits?: No  Do you have a living will?: Yes  Do you have a Durable POA (Power of ) for healthcare decisions?: No  Do you have an Advanced Directive for end of life decisions?: No  How often have you used an illegal drug (including marijuana) or a prescription medication for non-medical reasons in the past " year?: never  What is the typical number of drinks you consume in a day?: 0  What is the typical number of drinks you consume in a week?: 2  How often did you have a drink containing alcohol in the past year?: 2 to 4 times a month  How many drinks did you have on a typical day  when you were drinking in the past year?: 0  How often did you have 6 or more drinks on one occasion in the past year?: less than monthly     Assessment and Plan:     Problem List Items Addressed This Visit        Endocrine    DMII (diabetes mellitus, type 2) ([place] [address] )    Relevant Orders    Albumin / creatinine urine ratio       Cardiovascular and Mediastinum    Hypertension       Other    Depression, recurrent ([place] [address] )    Obesity, morbid ([place] [address] )   Other Visit Diagnoses     Medicare annual wellness visit, subsequent    -  Primary        Diabetes controlled  Recent A1c 6 7  Continue to follow-up with endocrinology and current diabetic treatment with  Januvia  Blood pressure well controlled today  /80  Continue verapamil, losartan and hydrochlorothiazide  Depression also stable  Continue Zoloft 50 mg daily  She may consider decreasing to 25 mg daily by cutting pills in half  If she decides to do that and stay on 25 mg dose recommended to send me a message on Z2 so I can refill next prescription with 25 mg tablets  Follow-up in 5 months  BMI Counseling: Body mass index is 39 53 kg/m²  The BMI is above normal  Nutrition recommendations include decreasing portion sizes, encouraging healthy choices of fruits and vegetables, decreasing fast food intake, consuming healthier snacks, limiting drinks that contain sugar, moderation in carbohydrate intake, increasing intake of lean protein, reducing intake of saturated and trans fat and reducing intake of cholesterol  Exercise recommendations include exercising 3-5 times per week  No pharmacotherapy was ordered  Rationale for BMI follow-up plan is due to patient being overweight or obese  Urinary Incontinence Plan of Care: counseling topics discussed: use restroom every 2 hours, limit alcohol, caffeine, spicy foods, and acidic foods, keeping a bladder diary and limiting fluid intake 3-4 hours before bed  Preventive health issues were discussed with patient, and age appropriate screening tests were ordered as noted in patient's After Visit Summary  Personalized health advice and appropriate referrals for health education or preventive services given if needed, as noted in patient's After Visit Summary  History of Present Illness:     Patient presents for a Medicare Wellness Visit    Presents office for Medicare wellness visit and chronic follow-up  Has history of hypertension and diabetes  Following with endocrinology for diabetes  Had recent A1c which was 6 7  Needs copy to submit to insurance  No changes to her diabetic regiment  Tolerating medication well and no hypoglycemic episodes  Blood pressures well controlled  Tolerating blood pressure medications well no side effects  Denies any low blood pressures  On Zoloft 50 mg for depression  Doing well with current dose  She has been on it for many years  She says 2 of her major stressors are no longer in her life and she may consider going down to 25 mg on Zoloft  Patient Care Team:  Dawna Gonzalez MD as PCP - General (Family Medicine)  MD Jay Storey MD Pastor Che, MD Dedrick Laity, MD as Endoscopist     Review of Systems:     Review of Systems   All other systems reviewed and are negative         Problem List:     Patient Active Problem List   Diagnosis   • Hypertension   • Depression, recurrent ([facility])   • Chronic pain of right ankle   • De Quervain's tenosynovitis, left   • Diverticulosis   • DMII (diabetes mellitus, type 2) (formerly [facility])   • Elevated bilirubin   • Esophageal reflux   • Fracture of right heel   • Left ventricular hypertrophy   • Seasonal allergic rhinitis   • Secondary mitral valve insufficiency   • Uterovaginal prolapse   • History of cholecystectomy   • Onychomycosis   • [facility]athyroidism ([facility] )   • Obesity, morbid ([facility] )      Past Medical and Surgical History:     Past Medical History:   Diagnosis Date   • Anxiety    • Carcinoma ([facility] )     carcinoma of the skin   • Chronic pain disorder     Right ankle fracture   • Depression    • Diabetes mellitus ([place] [address] )     Type 2   • Diverticulosis    • GERD (gastroesophageal reflux disease)    • Heart murmur     mitral valve insufficiency   • Hypertension    • Tenosynovitis, de Quervain    • Vertigo     last assessed 8/7/2012     Past Surgical History:   Procedure Laterality Date   • CHOLECYSTECTOMY     • COLONOSCOPY     • COLPOSCOPY W/ BIOPSY / CURETTAGE      cervix  last assessed 8/11/1998   • ENDOMETRIAL BIOPSY      onset 4/13/2011   • FOOT SURGERY      R foot and ankle fracture, fused with screws   • HYSTEROSCOPY W/ POLYPECTOMY     • MN COLONOSCOPY FLX DX W/COLLJ SPEC WHEN PFRMD N/A 6/20/2017    Procedure: COLONOSCOPY;  Surgeon: Emily Meyers MD;  Location: [facility]; Service: General   • TONSILLECTOMY        Family History:     Family History   Problem Relation Age of Onset   • Breast cancer Mother         Breast surgery mastectomy   • Dementia Mother    • Colon cancer Father    • Liver cancer Father       Social History:     Social History     Socioeconomic History   • Marital status:      Spouse name: None   • Number of children: None   • Years of education: None   • Highest education level: None   Occupational History   • None   Tobacco Use   • Smoking status: Never     Passive exposure: Yes   • Smokeless tobacco: Never   • Tobacco comments:     as per [employer] second hand  smoke exosure    quit june 2016   Vaping Use   • Vaping Use: Never used   Substance and Sexual Activity   • Alcohol use:  Yes     Alcohol/week: 1 0 standard drink of alcohol     Types: 1 Cans of beer per week     Comment: once a week   • Drug use: No   • Sexual activity: Not Currently     Partners: Male   Other Topics Concern   • None   Social History Narrative    Caffeine Use    Uses safety equipment- seatbelts     Social Determinants of Health     Financial Resource Strain: Low Risk  (6/4/2023)    Overall Financial Resource Strain (CARDIA)    • Difficulty of Paying Living Expenses: Not hard at all   Food Insecurity: Not on file   Transportation Needs: No Transportation Needs (6/4/2023)    PRAPARE - Transportation    • Lack of Transportation (Medical): No    • Lack of Transportation (Non-Medical):  No   Physical Activity: Not on file   Stress: Not on file   Social Connections: Not on file   Intimate Partner Violence: Not on file   Housing Stability: Not on file      Medications and Allergies:     Current Outpatient Medications   Medication Sig Dispense Refill   • Azelastine HCl 137 MCG/SPRAY SOLN USE 1 SPRAY(S) IN EACH NOSTRIL TWICE DAILY AS DIRECTED 30 mL 0   • Bimatoprost (LUMIGAN OP) Apply 1 drop to eye daily at bedtime     • cetirizine (ZyrTEC) 10 mg tablet Take 10 mg by mouth daily     • cholecalciferol (VITAMIN D3) 400 units tablet Take 400 Units by mouth daily     • cyanocobalamin (VITAMIN B-12) 100 mcg tablet Take by mouth daily     • famotidine (PEPCID) 20 mg tablet Take 20 mg by mouth daily as needed      • fluticasone (FLONASE) 50 mcg/act nasal spray 2 sprays into each nostril daily 48 g 1   • hydrochlorothiazide (HYDRODIURIL) 25 mg tablet Take 1 tablet (25 mg total) by mouth daily 90 tablet 1   • Januvia 50 MG tablet TAKE 1 TABLET DAILY 90 tablet 1   • losartan (COZAAR) 100 MG tablet Take 1 tablet (100 mg total) by mouth daily 90 tablet 1   • Multiple Vitamins-Minerals (MULTIVITAMIN ADULT PO) Take by mouth     • sertraline (ZOLOFT) 50 mg tablet Take 1 tablet (50 mg total) by mouth every morning 90 tablet 1   • verapamil (CALAN-SR) 120 mg CR tablet Take 1 tablet (120 mg total) by mouth daily at bedtime 90 tablet 1     No current facility-administered medications for this visit  No Known Allergies   Immunizations:     Immunization History   Administered Date(s) Administered   • COVID-19 MODERNA VACC 0 5 ML IM 02/20/2021, 03/20/2021   • H1N1, All Formulations 01/08/2010   • INFLUENZA 11/06/2008, 10/18/2018, 12/06/2021, 11/03/2022   • Influenza Quadrivalent Preservative Free 3 years and older IM 10/05/2015, 09/13/2017   • Influenza, high dose seasonal 0 7 mL 09/15/2020   • Influenza, injectable, quadrivalent, preservative free 0 5 mL 10/17/2019   • Influenza, seasonal, injectable 10/05/2010, 11/13/2012, 11/08/2013, 10/29/2014, 10/18/2016   • Pneumococcal Conjugate 13-Valent 06/06/2019   • Pneumococcal Polysaccharide PPV23 02/25/2016   • Tdap 09/03/2015   • Zoster Vaccine Recombinant 09/15/2020, 01/21/2021      Health Maintenance:         Topic Date Due   • Breast Cancer Screening: Mammogram  11/07/2023   • Cervical Cancer Screening  04/21/2024   • Colorectal Cancer Screening  05/06/2028   • Hepatitis C Screening  Completed         Topic Date Due   • Pneumococcal Vaccine: 65+ Years (3 - PPSV23 if available, else PCV20) 02/25/2021   • COVID-19 Vaccine (3 - Moderna series) 05/15/2021      Medicare Screening Tests and Risk Assessments: Cherie Corrales is here for her Subsequent Wellness visit  Last Medicare Wellness visit information reviewed, patient interviewed, no change since last AWV  Health Risk Assessment:   Patient rates overall health as good  Patient feels that their physical health rating is same  Patient is satisfied with their life  Eyesight was rated as same  Hearing was rated as same  Patient feels that their emotional and mental health rating is same  Patients states they are never, rarely angry  Patient states they are often unusually tired/fatigued  Pain experienced in the last 7 days has been some  Patient's pain rating has been 3/10  Patient states that she has experienced no weight loss or gain in last 6 months       Fall Risk Screening: In the past year, patient has experienced: no history of falling in past year      Urinary Incontinence Screening:   Patient has leaked urine accidently in the last six months  Home Safety:  Patient has trouble with stairs inside or outside of their home  Patient has working smoke alarms and has working carbon monoxide detector  Home safety hazards include: none  Nutrition:   Current diet is Regular  Medications:   Patient is currently taking over-the-counter supplements  OTC medications include: B12, D3, Hair, skin & nails  Patient is able to manage medications  Activities of Daily Living (ADLs)/Instrumental Activities of Daily Living (IADLs):   Walk and transfer into and out of bed and chair?: Yes  Dress and groom yourself?: Yes    Bathe or shower yourself?: Yes    Feed yourself?  Yes  Do your laundry/housekeeping?: Yes  Manage your money, pay your bills and track your expenses?: Yes  Make your own meals?: Yes    Do your own shopping?: Yes    Previous Hospitalizations:   Any hospitalizations or ED visits within the last 12 months?: No      Advance Care Planning:   Living will: Yes    Durable POA for healthcare: No    Advanced directive: No    Advanced directive counseling given: Yes    End of Life Decisions reviewed with patient: Yes      Cognitive Screening:   Provider or family/friend/caregiver concerned regarding cognition?: No    PREVENTIVE SCREENINGS      Cardiovascular Screening:    General: Screening Current      Diabetes Screening:     General: Screening Not Indicated and History Diabetes      Colorectal Cancer Screening:     General: Screening Current      Breast Cancer Screening:     General: Screening Current      Cervical Cancer Screening:    General: Screening Not Indicated      Osteoporosis Screening:    General: Risks and Benefits Discussed and Screening Not Indicated      Abdominal Aortic Aneurysm (AAA) Screening:        General: Screening Not Indicated      Lung Cancer Screening:     General: Screening Not Indicated      Hepatitis C Screening:    General: Screening Current    Hep C Screening Accepted: No     Screening, Brief Intervention, and Referral to Treatment (SBIRT)    Screening  Typical number of drinks in a day: 0  Typical number of drinks in a week: 2  Interpretation: Low risk drinking behavior  AUDIT-C Screenin) How often did you have a drink containing alcohol in the past year? 2 to 4 times a month  2) How many drinks did you have on a typical day when you were drinking in the past year? 0  3) How often did you have 6 or more drinks on one occasion in the past year? less than monthly    AUDIT-C Score: 3  Interpretation: Score 3-12 (female): POSITIVE screen for alcohol misuse    AUDIT Screenin) How often during the last year have you found that you were not able to stop drinking once you had started? 0 - never  5) How often during the last year have you failed to do what was normally expected from you because of drinking? 0 - never  6) How often during the last year have you needed a first drink in the morning to get yourself going after a heavy drinking session?  0 - never  7) How often during the last year have you had a feeling of guilt or remorse after drinking? 0 - never  8) How often during the last year have you been unable to remember what happened the night before because you had been drinking? 0 - never  9) Have you or someone else been injured as a result of your drinking? 0 - no  10) Has a relative or friend or a doctor or another health worker been concerned about your drinking or suggested you cut down? 0 - no    AUDIT Score: 3  Interpretation: Low risk alcohol consumption    Single Item Drug Screening:  How often have you used an illegal drug (including marijuana) or a prescription medication for non-medical reasons in the past year? never    Single Item Drug Screen Score: 0  Interpretation: Negative screen for possible drug use "disorder    Brief Intervention  Alcohol & drug use screenings were reviewed  No concerns regarding substance use disorder identified  Healthy alcohol use/limits discussed  Other Counseling Topics:   Car/seat belt/driving safety, skin self-exam, sunscreen and calcium and vitamin D intake and regular weightbearing exercise  No results found  Physical Exam:     /80 (BP Location: Left arm, Patient Position: Sitting, Cuff Size: Large)   Pulse 62   Temp 98 9 °F (37 2 °C) (Tympanic)   Ht 5' 0 5\" (1 537 m)   Wt 93 4 kg (205 lb 12 8 oz)   LMP  (LMP Unknown)   SpO2 99%   BMI 39 53 kg/m²     Physical Exam  Vitals and nursing note reviewed  Constitutional:       General: She is not in acute distress  Appearance: Normal appearance  She is well-developed  She is not ill-appearing or diaphoretic  HENT:      Head: Normocephalic and atraumatic  Eyes:      General:         Right eye: No discharge  Left eye: No discharge  Extraocular Movements: Extraocular movements intact  Conjunctiva/sclera: Conjunctivae normal    Cardiovascular:      Rate and Rhythm: Normal rate  Pulses: no weak pulses          Dorsalis pedis pulses are 2+ on the right side and 2+ on the left side  Posterior tibial pulses are 2+ on the right side and 2+ on the left side  Pulmonary:      Effort: Pulmonary effort is normal  No respiratory distress  Musculoskeletal:         General: No swelling  Cervical back: Normal range of motion and neck supple  Feet:    Feet:      Right foot:      Skin integrity: No ulcer, skin breakdown, erythema, warmth, callus or dry skin  Left foot:      Skin integrity: No ulcer, skin breakdown, erythema, warmth, callus or dry skin  Skin:     General: Skin is warm and dry  Capillary Refill: Capillary refill takes less than 2 seconds  Neurological:      Mental Status: She is alert     Psychiatric:         Mood and Affect: Mood normal          " Behavior: Behavior normal          Thought Content:  Thought content normal          Judgment: Judgment normal           Ioana Davison MD

## 2023-06-05 NOTE — PATIENT INSTRUCTIONS
Medicare Preventive Visit Patient Instructions  Thank you for completing your Welcome to Medicare Visit or Medicare Annual Wellness Visit today  Your next wellness visit will be due in one year (6/5/2024)  The screening/preventive services that you may require over the next 5-10 years are detailed below  Some tests may not apply to you based off risk factors and/or age  Screening tests ordered at today's visit but not completed yet may show as past due  Also, please note that scanned in results may not display below  Preventive Screenings:  Service Recommendations Previous Testing/Comments   Colorectal Cancer Screening  * Colonoscopy    * Fecal Occult Blood Test (FOBT)/Fecal Immunochemical Test (FIT)  * Fecal DNA/Cologuard Test  * Flexible Sigmoidoscopy Age: 39-70 years old   Colonoscopy: every 10 years (may be performed more frequently if at higher risk)  OR  FOBT/FIT: every 1 year  OR  Cologuard: every 3 years  OR  Sigmoidoscopy: every 5 years  Screening may be recommended earlier than age 39 if at higher risk for colorectal cancer  Also, an individualized decision between you and your healthcare provider will decide whether screening between the ages of 74-80 would be appropriate  Colonoscopy: 05/08/2023  FOBT/FIT: Not on file  Cologuard: Not on file  Sigmoidoscopy: Not on file    Screening Current     Breast Cancer Screening Age: 36 years old  Frequency: every 1-2 years  Not required if history of left and right mastectomy Mammogram: 11/07/2022    Screening Current   Cervical Cancer Screening Between the ages of 21-29, pap smear recommended once every 3 years  Between the ages of 33-67, can perform pap smear with HPV co-testing every 5 years     Recommendations may differ for women with a history of total hysterectomy, cervical cancer, or abnormal pap smears in past  Pap Smear: 04/21/2021    Screening Not Indicated   Hepatitis C Screening Once for adults born between 1945 and 1965  More frequently in patients at high risk for Hepatitis C Hep C Antibody: 05/18/2022    Screening Current   Diabetes Screening 1-2 times per year if you're at risk for diabetes or have pre-diabetes Fasting glucose: 138 mg/dL (5/22/2023)  A1C: 6 7 % (5/22/2023)  Screening Not Indicated  History Diabetes   Cholesterol Screening Once every 5 years if you don't have a lipid disorder  May order more often based on risk factors  Lipid panel: 05/22/2023    Screening Current     Other Preventive Screenings Covered by Medicare:  1  Abdominal Aortic Aneurysm (AAA) Screening: covered once if your at risk  You're considered to be at risk if you have a family history of AAA  2  Lung Cancer Screening: covers low dose CT scan once per year if you meet all of the following conditions: (1) Age 50-69; (2) No signs or symptoms of lung cancer; (3) Current smoker or have quit smoking within the last 15 years; (4) You have a tobacco smoking history of at least 20 pack years (packs per day multiplied by number of years you smoked); (5) You get a written order from a healthcare provider  3  Glaucoma Screening: covered annually if you're considered high risk: (1) You have diabetes OR (2) Family history of glaucoma OR (3)  aged 48 and older OR (3)  American aged 72 and older  3  Osteoporosis Screening: covered every 2 years if you meet one of the following conditions: (1) You're estrogen deficient and at risk for osteoporosis based off medical history and other findings; (2) Have a vertebral abnormality; (3) On glucocorticoid therapy for more than 3 months; (4) Have primary hyperparathyroidism; (5) On osteoporosis medications and need to assess response to drug therapy  · Last bone density test (DXA Scan): 04/01/2021   5  HIV Screening: covered annually if you're between the age of 15-65  Also covered annually if you are younger than 13 and older than 72 with risk factors for HIV infection   For pregnant patients, it is covered up to 3 times per pregnancy  Immunizations:  Immunization Recommendations   Influenza Vaccine Annual influenza vaccination during flu season is recommended for all persons aged >= 6 months who do not have contraindications   Pneumococcal Vaccine   * Pneumococcal conjugate vaccine = PCV13 (Prevnar 13), PCV15 (Vaxneuvance), PCV20 (Prevnar 20)  * Pneumococcal polysaccharide vaccine = PPSV23 (Pneumovax) Adults 25-60 years old: 1-3 doses may be recommended based on certain risk factors  Adults 72 years old: 1-2 doses may be recommended based off what pneumonia vaccine you previously received   Hepatitis B Vaccine 3 dose series if at intermediate or high risk (ex: diabetes, end stage renal disease, liver disease)   Tetanus (Td) Vaccine - COST NOT COVERED BY MEDICARE PART B Following completion of primary series, a booster dose should be given every 10 years to maintain immunity against tetanus  Td may also be given as tetanus wound prophylaxis  Tdap Vaccine - COST NOT COVERED BY MEDICARE PART B Recommended at least once for all adults  For pregnant patients, recommended with each pregnancy  Shingles Vaccine (Shingrix) - COST NOT COVERED BY MEDICARE PART B  2 shot series recommended in those aged 48 and above     Health Maintenance Due:      Topic Date Due   • Breast Cancer Screening: Mammogram  11/07/2023   • Cervical Cancer Screening  04/21/2024   • Colorectal Cancer Screening  05/06/2028   • Hepatitis C Screening  Completed     Immunizations Due:      Topic Date Due   • Pneumococcal Vaccine: 65+ Years (3 - PPSV23 if available, else PCV20) 02/25/2021   • COVID-19 Vaccine (3 - Leisa Danker series) 05/15/2021     Advance Directives   What are advance directives? Advance directives are legal documents that state your wishes and plans for medical care  These plans are made ahead of time in case you lose your ability to make decisions for yourself   Advance directives can apply to any medical decision, such as the treatments you want, and if you want to donate organs  What are the types of advance directives? There are many types of advance directives, and each state has rules about how to use them  You may choose a combination of any of the following:  · Living will: This is a written record of the treatment you want  You can also choose which treatments you do not want, which to limit, and which to stop at a certain time  This includes surgery, medicine, IV fluid, and tube feedings  · Durable power of  for healthcare [facility]): This is a written record that states who you want to make healthcare choices for you when you are unable to make them for yourself  This person, called a proxy, is usually a family member or a friend  You may choose more than 1 proxy  · Do not resuscitate (DNR) order:  A DNR order is used in case your heart stops beating or you stop breathing  It is a request not to have certain forms of treatment, such as CPR  A DNR order may be included in other types of advance directives  · Medical directive: This covers the care that you want if you are in a coma, near death, or unable to make decisions for yourself  You can list the treatments you want for each condition  Treatment may include pain medicine, surgery, blood transfusions, dialysis, IV or tube feedings, and a ventilator (breathing machine)  · Values history: This document has questions about your views, beliefs, and how you feel and think about life  This information can help others choose the care that you would choose  Why are advance directives important? An advance directive helps you control your care  Although spoken wishes may be used, it is better to have your wishes written down  Spoken wishes can be misunderstood, or not followed  Treatments may be given even if you do not want them  An advance directive may make it easier for your family to make difficult choices about your care     Urinary Incontinence   Urinary incontinence (UI) is when you lose control of your bladder  UI develops because your bladder cannot store or empty urine properly  The 3 most common types of UI are stress incontinence, urge incontinence, or both  Medicines:   · May be given to help strengthen your bladder control  Report any side effects of medication to your healthcare provider  Do pelvic muscle exercises often:  Your pelvic muscles help you stop urinating  Squeeze these muscles tight for 5 seconds, then relax for 5 seconds  Gradually work up to squeezing for 10 seconds  Do 3 sets of 15 repetitions a day, or as directed  This will help strengthen your pelvic muscles and improve bladder control  Train your bladder:  Go to the bathroom at set times, such as every 2 hours, even if you do not feel the urge to go  You can also try to hold your urine when you feel the urge to go  For example, hold your urine for 5 minutes when you feel the urge to go  As that becomes easier, hold your urine for 10 minutes  Self-care:   · Keep a UI record  Write down how often you leak urine and how much you leak  Make a note of what you were doing when you leaked urine  · Drink liquids as directed  You may need to limit the amount of liquid you drink to help control your urine leakage  Do not drink any liquid right before you go to bed  Limit or do not have drinks that contain caffeine or alcohol  · Prevent constipation  Eat a variety of high-fiber foods  Good examples are high-fiber cereals, beans, vegetables, and whole-grain breads  Walking is the best way to trigger your intestines to have a bowel movement  · Exercise regularly and maintain a healthy weight  Weight loss and exercise will decrease pressure on your bladder and help you control your leakage  · Use a catheter as directed  to help empty your bladder  A catheter is a tiny, plastic tube that is put into your bladder to drain your urine  · Go to behavior therapy as directed    Behavior therapy may be used to help you learn to control your urge to urinate  Weight Management   Why it is important to manage your weight:  Being overweight increases your risk of health conditions such as heart disease, high blood pressure, type 2 diabetes, and certain types of cancer  It can also increase your risk for osteoarthritis, sleep apnea, and other respiratory problems  Aim for a slow, steady weight loss  Even a small amount of weight loss can lower your risk of health problems  How to lose weight safely:  A safe and healthy way to lose weight is to eat fewer calories and get regular exercise  You can lose up about 1 pound a week by decreasing the number of calories you eat by 500 calories each day  Healthy meal plan for weight management:  A healthy meal plan includes a variety of foods, contains fewer calories, and helps you stay healthy  A healthy meal plan includes the following:  · Eat whole-grain foods more often  A healthy meal plan should contain fiber  Fiber is the part of grains, fruits, and vegetables that is not broken down by your body  Whole-grain foods are healthy and provide extra fiber in your diet  Some examples of whole-grain foods are whole-wheat breads and pastas, oatmeal, brown rice, and bulgur  · Eat a variety of vegetables every day  Include dark, leafy greens such as spinach, kale, bailee greens, and mustard greens  Eat yellow and orange vegetables such as carrots, sweet potatoes, and winter squash  · Eat a variety of fruits every day  Choose fresh or canned fruit (canned in its own juice or light syrup) instead of juice  Fruit juice has very little or no fiber  · Eat low-fat dairy foods  Drink fat-free (skim) milk or 1% milk  Eat fat-free yogurt and low-fat cottage cheese  Try low-fat cheeses such as mozzarella and other reduced-fat cheeses  · Choose meat and other protein foods that are low in fat  Choose beans or other legumes such as split peas or lentils   Choose fish, skinless poultry "(chicken or turkey), or lean cuts of red meat (beef or pork)  Before you cook meat or poultry, cut off any visible fat  · Use less fat and oil  Try baking foods instead of frying them  Add less fat, such as margarine, sour cream, regular salad dressing and mayonnaise to foods  Eat fewer high-fat foods  Some examples of high-fat foods include french fries, doughnuts, ice cream, and cakes  · Eat fewer sweets  Limit foods and drinks that are high in sugar  This includes candy, cookies, regular soda, and sweetened drinks  Exercise:  Exercise at least 30 minutes per day on most days of the week  Some examples of exercise include walking, biking, dancing, and swimming  You can also fit in more physical activity by taking the stairs instead of the elevator or parking farther away from stores  Ask your healthcare provider about the best exercise plan for you  Alcohol Use and Your Health    Drinking too much can harm your health  Excessive alcohol use leads to about 88,000 death in the [place] each year, and shortens the life of those who diet by almost 30 years  Further, excessive drinking cost the economy $249 billion in 2010  Most excessive drinkers are not alcohol dependent  Excessive alcohol use has immediate effects that increase the risk of many harmful health conditions  These are most often the result of binge drinking  Over time, excessive alcohol use can lead to the development of chronic diseases and other series health problems  What is considered a \"drink\"? Excessive alcohol use includes:  · Binge Drinking: For women, 4 or more drinks consumed on one occasion  For men, 5 or more drinks consumed on one occasion  · Heavy Drinking: For women, 8 or more drinks per week   For men, 15 or more drinks per week  · Any alcohol used by pregnant women  · Any alcohol used by those under the age of 21 years    If you choose to drink, do so in moderation:  · Do not drink at all if you are under " the age of 24, or if you are or may be pregnant, or have health problems that could be made worse by drinking  · For women, up to 1 drink per day  · For men, up to 2 drinks a day    No one should begin drinking or drink more frequently based on potential health benefits    Short-Term Health Risks:  · Injuries: motor vehicle crashes, falls, drownings, burns  · Violence: homicide, suicide, sexual assault, intimate partner violence  · Alcohol poisoning  · Reproductive health: risky sexual behaviors, unintended prengnacy, sexually transmitted diseases, miscarriage, stillbirth, fetal alcohol syndrome    Long-Term Health Risks:  · Chronic diseases: high blood pressure, heart disease, stroke, liver disease, digestive problems  · Cancers: breast, mouth and throat, liver, colon  · Learning and memory problems: dementia, poor school performance  · Mental health: depression, anxiety, insomnia  · Social problems: lost productivity, family problems, unemployment  · Alcohol dependence    For support and more information:  · Substance Abuse and [facility] [address] [place]  Web Address: https://[employer]/    · Alcoholics Anonymous        Web Address: http://[employer]/    https://www cdc gov/alcohol/fact-sheets/alcohol-use htm     © Copyright [employer] 2018 Information is for End User's use only and may not be sold, redistributed or otherwise used for commercial purposes   All illustrations and images included in CareNotes® are the copyrighted property of A D A M , Inc  or [address] [place] [address]

## 2023-06-07 DIAGNOSIS — I10 ESSENTIAL HYPERTENSION: ICD-10-CM

## 2023-06-07 RX ORDER — HYDROCHLOROTHIAZIDE 25 MG/1
25 TABLET ORAL DAILY
Qty: 90 TABLET | Refills: 1 | Status: SHIPPED | OUTPATIENT
Start: 2023-06-07 | End: 2023-09-05

## 2023-07-19 DIAGNOSIS — E11.9 TYPE 2 DIABETES MELLITUS WITHOUT COMPLICATION, WITHOUT LONG-TERM CURRENT USE OF INSULIN (HCC): ICD-10-CM

## 2023-08-28 DIAGNOSIS — F33.9 DEPRESSION, RECURRENT (HCC): ICD-10-CM

## 2023-08-28 DIAGNOSIS — I10 ESSENTIAL HYPERTENSION: ICD-10-CM

## 2023-08-28 RX ORDER — HYDROCHLOROTHIAZIDE 25 MG/1
25 TABLET ORAL DAILY
Qty: 90 TABLET | Refills: 0 | Status: SHIPPED | OUTPATIENT
Start: 2023-08-28

## 2023-09-16 DIAGNOSIS — I10 ESSENTIAL HYPERTENSION: ICD-10-CM

## 2023-09-18 RX ORDER — LOSARTAN POTASSIUM 100 MG/1
100 TABLET ORAL DAILY
Qty: 90 TABLET | Refills: 0 | Status: SHIPPED | OUTPATIENT
Start: 2023-09-18

## 2023-10-26 DIAGNOSIS — E11.9 TYPE 2 DIABETES MELLITUS WITHOUT COMPLICATION, WITHOUT LONG-TERM CURRENT USE OF INSULIN (HCC): ICD-10-CM

## 2023-10-26 RX ORDER — SITAGLIPTIN 50 MG/1
50 TABLET, FILM COATED ORAL DAILY
Qty: 90 TABLET | Refills: 0 | Status: SHIPPED | OUTPATIENT
Start: 2023-10-26

## 2023-12-17 DIAGNOSIS — I10 ESSENTIAL HYPERTENSION: ICD-10-CM

## 2023-12-18 RX ORDER — LOSARTAN POTASSIUM 100 MG/1
100 TABLET ORAL DAILY
Qty: 90 TABLET | Refills: 0 | Status: SHIPPED | OUTPATIENT
Start: 2023-12-18

## 2023-12-18 NOTE — TELEPHONE ENCOUNTER
Patient overdue for diabetes and hypertension follow-up.  Last seen in June 2023.  Please call and schedule an appointment .  It can be after the new year at patient's convenience.  Thank you.

## 2023-12-18 NOTE — TELEPHONE ENCOUNTER
Requested medication(s) are due for refill today: Yes  Patient has already received a courtesy refill: No  Other reason request has been forwarded to provider: failed refill protocol

## 2023-12-18 NOTE — TELEPHONE ENCOUNTER
Send pt a message on [facility] [facility] regarding this information as she was last active today, 12/18.

## 2024-01-09 ENCOUNTER — VBI (OUTPATIENT)
Dept: ADMINISTRATIVE | Facility: OTHER | Age: 69
End: 2024-01-09

## 2024-01-26 ENCOUNTER — TELEPHONE (OUTPATIENT)
Dept: ADMINISTRATIVE | Facility: OTHER | Age: 69
End: 2024-01-26

## 2024-01-26 NOTE — TELEPHONE ENCOUNTER
01/26/24 10:11 AM    Patient contacted (left message) to bring Advance Directive, POLST, or Living Will document to next scheduled pcp visit.    Thank you.  Liv Ch  [facility]

## 2024-01-29 ENCOUNTER — OFFICE VISIT (OUTPATIENT)
Dept: FAMILY MEDICINE CLINIC | Facility: CLINIC | Age: 69
End: 2024-01-29
Payer: MEDICARE

## 2024-01-29 VITALS
TEMPERATURE: 98.5 F | BODY MASS INDEX: 39.08 KG/M2 | DIASTOLIC BLOOD PRESSURE: 90 MMHG | OXYGEN SATURATION: 95 % | SYSTOLIC BLOOD PRESSURE: 140 MMHG | WEIGHT: 207 LBS | HEIGHT: 61 IN | HEART RATE: 65 BPM

## 2024-01-29 DIAGNOSIS — F33.9 DEPRESSION, RECURRENT (HCC): ICD-10-CM

## 2024-01-29 DIAGNOSIS — E66.01 OBESITY, MORBID (HCC): ICD-10-CM

## 2024-01-29 DIAGNOSIS — E21.3 HYPERPARATHYROIDISM (HCC): ICD-10-CM

## 2024-01-29 DIAGNOSIS — E11.9 TYPE 2 DIABETES MELLITUS WITHOUT COMPLICATION, WITHOUT LONG-TERM CURRENT USE OF INSULIN (HCC): Primary | ICD-10-CM

## 2024-01-29 LAB — SL AMB POCT HEMOGLOBIN AIC: 6.9 (ref ?–6.5)

## 2024-01-29 PROCEDURE — 99214 OFFICE O/P EST MOD 30 MIN: CPT | Performed by: FAMILY MEDICINE

## 2024-01-29 PROCEDURE — 83036 HEMOGLOBIN GLYCOSYLATED A1C: CPT | Performed by: FAMILY MEDICINE

## 2024-01-29 RX ORDER — PYRITHIONE ZINC 1 G/ML
LOTION/SHAMPOO TOPICAL
COMMUNITY

## 2024-01-29 NOTE — PROGRESS NOTES
Name: Hung Doan      : 1955      MRN: [id number]  Encounter Provider: Eugene Acevedo MD  Encounter Date: 2024   Encounter department: [facility] [place]    Assessment & Plan     1. Type 2 diabetes mellitus without complication, without long-term current use of insulin (HCC)  -     POCT hemoglobin A1c  -     sitaGLIPtin (Januvia) 50 mg tablet; Take 1 tablet (50 mg total) by mouth daily  -     Comprehensive metabolic panel; Future  -     Hemoglobin A1C; Future  -     Lipid panel; Future  -     Albumin / creatinine urine ratio; Future    2. Depression, recurrent (HCC)    3. Hyperparathyroidism (HCC)    4. Obesity, morbid (HCC)           Subjective      Hypertension  This is a recurrent problem. The current episode started more than 1 year ago. The problem has been gradually improving since onset. The problem is controlled. Associated symptoms include malaise/fatigue. Pertinent negatives include no anxiety, blurred vision, chest pain, headaches, neck pain, orthopnea, palpitations, peripheral edema, PND, shortness of breath or sweats. Risk factors for coronary artery disease include diabetes mellitus and obesity. There are no compliance problems.      Review of Systems   Constitutional:  Positive for malaise/fatigue.   Eyes:  Negative for blurred vision.   Respiratory:  Negative for shortness of breath.    Cardiovascular:  Negative for chest pain, palpitations, orthopnea and PND.   Musculoskeletal:  Negative for neck pain.   Neurological:  Negative for headaches.       Current Outpatient Medications on File Prior to Visit   Medication Sig    Azelastine HCl 137 MCG/SPRAY SOLN USE 1 SPRAY(S) IN EACH NOSTRIL TWICE DAILY AS DIRECTED    Bimatoprost (LUMIGAN OP) Apply 1 drop to eye daily at bedtime    cetirizine (ZyrTEC) 10 mg tablet Take 10 mg by mouth daily    cholecalciferol (VITAMIN D3) 400 units tablet Take 400 Units by mouth daily    cyanocobalamin (VITAMIN B-12) 100 mcg tablet Take by  "mouth daily    famotidine (PEPCID) 20 mg tablet Take 20 mg by mouth daily as needed     fluticasone (FLONASE) 50 mcg/act nasal spray 2 sprays into each nostril daily    hydrochlorothiazide (HYDRODIURIL) 25 mg tablet Take 1 tablet by mouth once daily    losartan (COZAAR) 100 MG tablet Take 1 tablet by mouth once daily    Metamucil Fiber CHEW Chew    Multiple Vitamins-Minerals (MULTIVITAMIN ADULT PO) Take by mouth    psyllium (METAMUCIL) 0.52 g capsule Take 0.52 g by mouth daily    sertraline (ZOLOFT) 50 mg tablet TAKE 1 TABLET BY MOUTH IN THE MORNING    verapamil (CALAN-SR) 120 mg CR tablet TAKE 1 TABLET BY MOUTH ONCE DAILY AT BEDTIME    [DISCONTINUED] Januvia 50 MG tablet Take 1 tablet by mouth once daily    Influenza Vac Split High-Dose (FLUZONE HIGH-DOSE IM)  (Patient not taking: Reported on 1/29/2024)       Objective     /90 (BP Location: Left arm, Patient Position: Sitting, Cuff Size: Standard)   Pulse 65   Temp 98.5 °F (36.9 °C) (Tympanic)   Ht 5' 0.5\" (1.537 m)   Wt 93.9 kg (207 lb)   LMP  (LMP Unknown)   SpO2 95%   BMI 39.76 kg/m²     Physical Exam  Eugene Acevedo MD    "

## 2024-01-29 NOTE — PROGRESS NOTES
Name: Hung Doan      : 1955      MRN: [id number]  Encounter Provider: Eugene Acevedo MD  Encounter Date: 2024   Encounter department: [facility] AT [place]    Assessment & Plan     1. Type 2 diabetes mellitus without complication, without long-term current use of insulin (HCC)  -     POCT hemoglobin A1c  -     sitaGLIPtin (Januvia) 50 mg tablet; Take 1 tablet (50 mg total) by mouth daily  -     Comprehensive metabolic panel; Future  -     Hemoglobin A1C; Future  -     Lipid panel; Future  -     Albumin / creatinine urine ratio; Future    2. Depression, recurrent (HCC)    3. Hyperparathyroidism (HCC)    4. Obesity, morbid (HCC)    Diabetes controlled.  A1c 6.9 today.  Continue Januvia.  Diabetic labs ordered and she will get them done before next visit where we will discuss results.  Hypertension borderline today 140/90.  We will continue current antihypertensive regiment with hydrochlorothiazide losartan and verapamil.  She will start monitoring blood pressures at home.  Mood is stable.  Continue Zoloft 50 mg daily.  Parathyroidism also stable.  Follow-up in 3 months.       Subjective      Patient presents office to follow-up on chronic conditions.  Had history of diabetes and hypertension as well as depression and hyperparathyroidism.  She reports her mood is stable.  She is on Zoloft 50 mg daily and it is helping control her symptoms.  Some days are worse than others especially some days when she remembers a time she spent with her  watching sports but other than that her mood is well-controlled.  Does not measure blood pressures at home.  She does have a machine but says never really needed to.  She is on verapamil, losartan and hydrochlorothiazide.  She admits compliance.  Denies any symptoms of elevated blood pressure.    Hypertension  This is a recurrent problem. The current episode started more than 1 year ago. The problem has been gradually improving since onset. The  problem is controlled. Associated symptoms include malaise/fatigue. Pertinent negatives include no anxiety, blurred vision, chest pain, headaches, neck pain, orthopnea, palpitations, peripheral edema, PND, shortness of breath or sweats. Risk factors for coronary artery disease include diabetes mellitus and obesity. There are no compliance problems.      Review of Systems   Constitutional:  Positive for malaise/fatigue.   Eyes:  Negative for blurred vision.   Respiratory:  Negative for shortness of breath.    Cardiovascular:  Negative for chest pain, palpitations, orthopnea and PND.   Musculoskeletal:  Negative for neck pain.   Neurological:  Negative for headaches.   All other systems reviewed and are negative.      Current Outpatient Medications on File Prior to Visit   Medication Sig    Azelastine HCl 137 MCG/SPRAY SOLN USE 1 SPRAY(S) IN EACH NOSTRIL TWICE DAILY AS DIRECTED    Bimatoprost (LUMIGAN OP) Apply 1 drop to eye daily at bedtime    cetirizine (ZyrTEC) 10 mg tablet Take 10 mg by mouth daily    cholecalciferol (VITAMIN D3) 400 units tablet Take 400 Units by mouth daily    cyanocobalamin (VITAMIN B-12) 100 mcg tablet Take by mouth daily    famotidine (PEPCID) 20 mg tablet Take 20 mg by mouth daily as needed     fluticasone (FLONASE) 50 mcg/act nasal spray 2 sprays into each nostril daily    hydrochlorothiazide (HYDRODIURIL) 25 mg tablet Take 1 tablet by mouth once daily    losartan (COZAAR) 100 MG tablet Take 1 tablet by mouth once daily    Metamucil Fiber CHEW Chew    Multiple Vitamins-Minerals (MULTIVITAMIN ADULT PO) Take by mouth    psyllium (METAMUCIL) 0.52 g capsule Take 0.52 g by mouth daily    sertraline (ZOLOFT) 50 mg tablet TAKE 1 TABLET BY MOUTH IN THE MORNING    verapamil (CALAN-SR) 120 mg CR tablet TAKE 1 TABLET BY MOUTH ONCE DAILY AT BEDTIME    [DISCONTINUED] Januvia 50 MG tablet Take 1 tablet by mouth once daily    Influenza Vac Split High-Dose (FLUZONE HIGH-DOSE IM)  (Patient not taking:  "Reported on 1/29/2024)       Objective     /90 (BP Location: Left arm, Patient Position: Sitting, Cuff Size: Standard)   Pulse 65   Temp 98.5 °F (36.9 °C) (Tympanic)   Ht 5' 0.5\" (1.537 m)   Wt 93.9 kg (207 lb)   LMP  (LMP Unknown)   SpO2 95%   BMI 39.76 kg/m²     Physical Exam  Vitals and nursing note reviewed.   Constitutional:       General: She is not in acute distress.     Appearance: Normal appearance. She is not ill-appearing, toxic-appearing or diaphoretic.   Eyes:      General:         Right eye: No discharge.         Left eye: No discharge.      Extraocular Movements: Extraocular movements intact.      Conjunctiva/sclera: Conjunctivae normal.   Cardiovascular:      Rate and Rhythm: Normal rate and regular rhythm.      Pulses: Normal pulses.      Heart sounds: Normal heart sounds.   Pulmonary:      Effort: Pulmonary effort is normal.      Breath sounds: Normal breath sounds.   Musculoskeletal:      Cervical back: Normal range of motion and neck supple.   Neurological:      General: No focal deficit present.      Mental Status: She is alert and oriented to person, place, and time.   Psychiatric:         Mood and Affect: Mood normal.         Behavior: Behavior normal.         Thought Content: Thought content normal.         Judgment: Judgment normal.       Eugene Acevedo MD    "

## 2024-02-12 ENCOUNTER — VBI (OUTPATIENT)
Dept: ADMINISTRATIVE | Facility: OTHER | Age: 69
End: 2024-02-12

## 2024-02-25 DIAGNOSIS — I10 ESSENTIAL HYPERTENSION: ICD-10-CM

## 2024-02-25 DIAGNOSIS — F33.9 DEPRESSION, RECURRENT (HCC): ICD-10-CM

## 2024-02-25 RX ORDER — HYDROCHLOROTHIAZIDE 25 MG/1
25 TABLET ORAL DAILY
Qty: 90 TABLET | Refills: 0 | Status: SHIPPED | OUTPATIENT
Start: 2024-02-25

## 2024-03-04 LAB
LEFT EYE DIABETIC RETINOPATHY: NORMAL
RIGHT EYE DIABETIC RETINOPATHY: NORMAL

## 2024-03-18 DIAGNOSIS — I10 ESSENTIAL HYPERTENSION: ICD-10-CM

## 2024-03-18 DIAGNOSIS — J30.1 ALLERGIC RHINITIS DUE TO POLLEN, UNSPECIFIED SEASONALITY: ICD-10-CM

## 2024-03-18 RX ORDER — FLUTICASONE PROPIONATE 50 MCG
2 SPRAY, SUSPENSION (ML) NASAL DAILY
Qty: 48 G | Refills: 1 | Status: SHIPPED | OUTPATIENT
Start: 2024-03-18

## 2024-03-18 RX ORDER — FLUTICASONE PROPIONATE 50 MCG
2 SPRAY, SUSPENSION (ML) NASAL DAILY
Qty: 48 G | Refills: 1 | Status: SHIPPED | OUTPATIENT
Start: 2024-03-18 | End: 2024-03-18 | Stop reason: SDUPTHER

## 2024-03-18 NOTE — TELEPHONE ENCOUNTER
Reason for call:   [x] Refill   [] Prior Auth  [] Other:     Office:   [x] PCP/Provider -   [] Specialty/Provider -     Medication:  fluticasone (FLONASE) 50 mcg/act nasal spray    Dose/Frequency: 2 sprays into each nostril daily,     Quantity: 48g    Pharmacy: [facility] [address] [place] [address] [place] - [address] [phone]    Does the patient have enough for 3 days?   [] Yes   [x] No - Send as HP to POD

## 2024-03-19 RX ORDER — LOSARTAN POTASSIUM 100 MG/1
100 TABLET ORAL DAILY
Qty: 90 TABLET | Refills: 1 | Status: SHIPPED | OUTPATIENT
Start: 2024-03-19

## 2024-04-22 DIAGNOSIS — E11.9 TYPE 2 DIABETES MELLITUS WITHOUT COMPLICATION, WITHOUT LONG-TERM CURRENT USE OF INSULIN (HCC): ICD-10-CM

## 2024-04-22 NOTE — TELEPHONE ENCOUNTER
Reason for call:   [x] Refill   [] Prior Auth  [] Other:     Office:   [x] PCP/Provider -   [] Specialty/Provider -     Medication: Januvia     Dose/Frequency: 50 mg tablet taken by mouth once daily     Quantity: 90    Pharmacy: [facility] [place] - [facility] [phone]     Does the patient have enough for 3 days?   [x] Yes   [] No - Send as HP to POD

## 2024-05-26 DIAGNOSIS — F33.9 DEPRESSION, RECURRENT (HCC): ICD-10-CM

## 2024-05-26 DIAGNOSIS — I10 ESSENTIAL HYPERTENSION: ICD-10-CM

## 2024-05-27 RX ORDER — HYDROCHLOROTHIAZIDE 25 MG/1
25 TABLET ORAL DAILY
Qty: 30 TABLET | Refills: 0 | Status: SHIPPED | OUTPATIENT
Start: 2024-05-27

## 2024-05-28 ENCOUNTER — TELEPHONE (OUTPATIENT)
Dept: FAMILY MEDICINE CLINIC | Facility: CLINIC | Age: 69
End: 2024-05-28

## 2024-05-29 ENCOUNTER — LAB (OUTPATIENT)
Dept: LAB | Facility: CLINIC | Age: 69
End: 2024-05-29
Payer: MEDICARE

## 2024-05-29 DIAGNOSIS — E11.9 TYPE 2 DIABETES MELLITUS WITHOUT COMPLICATION, WITHOUT LONG-TERM CURRENT USE OF INSULIN (HCC): ICD-10-CM

## 2024-05-29 LAB
ALBUMIN SERPL BCP-MCNC: 4 G/DL (ref 3.5–5)
ALP SERPL-CCNC: 107 U/L (ref 34–104)
ALT SERPL W P-5'-P-CCNC: 36 U/L (ref 7–52)
ANION GAP SERPL CALCULATED.3IONS-SCNC: 8 MMOL/L (ref 4–13)
AST SERPL W P-5'-P-CCNC: 26 U/L (ref 13–39)
BILIRUB SERPL-MCNC: 1.09 MG/DL (ref 0.2–1)
BUN SERPL-MCNC: 17 MG/DL (ref 5–25)
CALCIUM SERPL-MCNC: 10.2 MG/DL (ref 8.4–10.2)
CHLORIDE SERPL-SCNC: 104 MMOL/L (ref 96–108)
CHOLEST SERPL-MCNC: 167 MG/DL
CO2 SERPL-SCNC: 29 MMOL/L (ref 21–32)
CREAT SERPL-MCNC: 0.66 MG/DL (ref 0.6–1.3)
EST. AVERAGE GLUCOSE BLD GHB EST-MCNC: 151 MG/DL
GFR SERPL CREATININE-BSD FRML MDRD: 90 ML/MIN/1.73SQ M
GLUCOSE P FAST SERPL-MCNC: 149 MG/DL (ref 65–99)
HBA1C MFR BLD: 6.9 %
HDLC SERPL-MCNC: 47 MG/DL
LDLC SERPL CALC-MCNC: 96 MG/DL (ref 0–100)
NONHDLC SERPL-MCNC: 120 MG/DL
POTASSIUM SERPL-SCNC: 4.3 MMOL/L (ref 3.5–5.3)
PROT SERPL-MCNC: 7.1 G/DL (ref 6.4–8.4)
SODIUM SERPL-SCNC: 141 MMOL/L (ref 135–147)
TRIGL SERPL-MCNC: 121 MG/DL

## 2024-05-29 PROCEDURE — 80061 LIPID PANEL: CPT

## 2024-05-29 PROCEDURE — 80053 COMPREHEN METABOLIC PANEL: CPT

## 2024-05-29 PROCEDURE — 36415 COLL VENOUS BLD VENIPUNCTURE: CPT

## 2024-05-29 PROCEDURE — 83036 HEMOGLOBIN GLYCOSYLATED A1C: CPT

## 2024-05-30 ENCOUNTER — APPOINTMENT (OUTPATIENT)
Dept: LAB | Facility: CLINIC | Age: 69
End: 2024-05-30
Payer: MEDICARE

## 2024-05-30 ENCOUNTER — TELEPHONE (OUTPATIENT)
Dept: ADMINISTRATIVE | Facility: OTHER | Age: 69
End: 2024-05-30

## 2024-05-30 PROCEDURE — 82043 UR ALBUMIN QUANTITATIVE: CPT

## 2024-05-30 PROCEDURE — 82570 ASSAY OF URINE CREATININE: CPT

## 2024-05-30 NOTE — TELEPHONE ENCOUNTER
05/30/24 3:33 PM    Patient contacted to bring Advance Directive, POLST, or Living Will document to next scheduled pcp visit.[facility] Department left message.    Thank you.  Liv Ch  [facility]

## 2024-05-31 LAB
CREAT UR-MCNC: 201 MG/DL
MICROALBUMIN UR-MCNC: 21.8 MG/L
MICROALBUMIN/CREAT 24H UR: 11 MG/G CREATININE (ref 0–30)

## 2024-06-03 ENCOUNTER — OFFICE VISIT (OUTPATIENT)
Dept: FAMILY MEDICINE CLINIC | Facility: CLINIC | Age: 69
End: 2024-06-03
Payer: MEDICARE

## 2024-06-03 VITALS
TEMPERATURE: 98 F | DIASTOLIC BLOOD PRESSURE: 60 MMHG | OXYGEN SATURATION: 98 % | SYSTOLIC BLOOD PRESSURE: 110 MMHG | HEART RATE: 64 BPM | BODY MASS INDEX: 40.64 KG/M2 | HEIGHT: 60 IN | WEIGHT: 207 LBS

## 2024-06-03 DIAGNOSIS — I10 PRIMARY HYPERTENSION: Primary | ICD-10-CM

## 2024-06-03 DIAGNOSIS — F33.9 DEPRESSION, RECURRENT (HCC): ICD-10-CM

## 2024-06-03 DIAGNOSIS — E11.9 TYPE 2 DIABETES MELLITUS WITHOUT COMPLICATION, WITHOUT LONG-TERM CURRENT USE OF INSULIN (HCC): ICD-10-CM

## 2024-06-03 DIAGNOSIS — K76.0 FATTY LIVER: ICD-10-CM

## 2024-06-03 PROCEDURE — 99214 OFFICE O/P EST MOD 30 MIN: CPT | Performed by: FAMILY MEDICINE

## 2024-06-03 PROCEDURE — G2211 COMPLEX E/M VISIT ADD ON: HCPCS | Performed by: FAMILY MEDICINE

## 2024-06-03 NOTE — ASSESSMENT & PLAN NOTE
Lab Results   Component Value Date    HGBA1C 6.9 (H) 05/29/2024   Controlled.  Continue Januvia.

## 2024-06-03 NOTE — PROGRESS NOTES
Ambulatory Visit  Name: Hung Doan      : 1955      MRN: [id number]  Encounter Provider: Eugene Acevedo MD  Encounter Date: 6/3/2024   Encounter department: [facility] AT [place]    Assessment & Plan   1. Primary hypertension  Assessment & Plan:  Controlled.  Continue losartan and hydrochlorothiazide with verapamil  2. Type 2 diabetes mellitus without complication, without long-term current use of insulin (HCC)  Assessment & Plan:    Lab Results   Component Value Date    HGBA1C 6.9 (H) 2024   Controlled.  Continue Januvia.  3. Fatty liver  Comments:  Last ultrasound in  showed moderate hepatic steatosis.  Get new ultrasound.  Orders:  -     US elastography/UGAP; Future; Expected date: 2024  4. Depression, recurrent (HCC)  Assessment & Plan:  Stable.  Continue Zoloft 50 mg daily.       Depression Screening and Follow-up Plan: Patient's depression screening was positive with a PHQ-9 score of 8. Patient with underlying depression and was advised to continue current medications as prescribed. Patient advised to follow-up with PCP for further management. She wants to stay with current meds and not increase.       History of Present Illness     Patient presents office today for 3-month follow-up.  Had lab work done recently.  A1c stable at 6.9.  She is on Januvia.  Tolerating Januvia well no issues.  Denies any hypoglycemic events.  Has history of hypertension which is well-controlled.  She is on losartan hydrochlorothiazide.  No issues with her blood pressure meds.  Denies any high or low blood pressures.  Her depression is okay.  She is on Zoloft 50 mg daily.  She says for the most part it is controlled but there are times where she does get some episodes that do not last very long.  She says it is more related to her low social activities and that she is busy and doing stuff she does not have any issues with her depression but when she is home sometimes by herself she does get  a little depressed.  No SI or HI.  She feels well otherwise on the 50 mg of Zoloft.  She feels a little more fatigued at times sometimes she stays up late to watch the family's.        Review of Systems   All other systems reviewed and are negative.    Medical History Reviewed by provider this encounter:  Tobacco  Allergies  Meds  Problems  Med Hx  Surg Hx  Fam Hx       Objective     /60 (BP Location: Left arm, Patient Position: Sitting, Cuff Size: Standard)   Pulse 64   Temp 98 °F (36.7 °C) (Tympanic)   Ht 5' (1.524 m)   Wt 93.9 kg (207 lb)   LMP  (LMP Unknown)   SpO2 98%   BMI 40.43 kg/m²     Physical Exam  Vitals and nursing note reviewed.   Constitutional:       General: She is not in acute distress.     Appearance: Normal appearance. She is well-developed. She is not ill-appearing, toxic-appearing or diaphoretic.   HENT:      Head: Normocephalic and atraumatic.   Eyes:      General:         Right eye: No discharge.         Left eye: No discharge.      Extraocular Movements: Extraocular movements intact.      Conjunctiva/sclera: Conjunctivae normal.   Cardiovascular:      Rate and Rhythm: Normal rate and regular rhythm.      Pulses: Normal pulses.      Heart sounds: Normal heart sounds.   Pulmonary:      Effort: Pulmonary effort is normal.      Breath sounds: Normal breath sounds.   Skin:     General: Skin is warm and dry.      Capillary Refill: Capillary refill takes less than 2 seconds.   Neurological:      General: No focal deficit present.      Mental Status: She is alert and oriented to person, place, and time.   Psychiatric:         Mood and Affect: Mood normal.         Behavior: Behavior normal.         Thought Content: Thought content normal.         Judgment: Judgment normal.       Administrative Statements

## 2024-06-18 DIAGNOSIS — I10 ESSENTIAL HYPERTENSION: ICD-10-CM

## 2024-06-18 RX ORDER — HYDROCHLOROTHIAZIDE 25 MG/1
25 TABLET ORAL DAILY
Qty: 90 TABLET | Refills: 1 | Status: SHIPPED | OUTPATIENT
Start: 2024-06-18

## 2024-08-28 DIAGNOSIS — J30.1 ALLERGIC RHINITIS DUE TO POLLEN, UNSPECIFIED SEASONALITY: ICD-10-CM

## 2024-08-28 NOTE — TELEPHONE ENCOUNTER
Received fax from [employer] regarding new script auth for pts nasal spray. Tried calling pt to ask what pharmacy she uses, [facility].

## 2024-08-28 NOTE — TELEPHONE ENCOUNTER
Patient called the RX Refill Line. Message is being forwarded to the office.     Patient would like to confirm that the fax the office received from [facility], is where she would like her medications to be sent.    [facility] - [place] - [facility] [[id number]]     Please contact patient at [phone]

## 2024-08-29 RX ORDER — FLUTICASONE PROPIONATE 50 MCG
2 SPRAY, SUSPENSION (ML) NASAL DAILY
Qty: 48 G | Refills: 1 | Status: SHIPPED | OUTPATIENT
Start: 2024-08-29

## 2024-09-16 DIAGNOSIS — I10 ESSENTIAL HYPERTENSION: ICD-10-CM

## 2024-09-17 RX ORDER — VERAPAMIL HYDROCHLORIDE 120 MG/1
120 TABLET, FILM COATED, EXTENDED RELEASE ORAL
Qty: 90 TABLET | Refills: 1 | Status: SHIPPED | OUTPATIENT
Start: 2024-09-17

## 2024-09-17 RX ORDER — LOSARTAN POTASSIUM 100 MG/1
100 TABLET ORAL DAILY
Qty: 90 TABLET | Refills: 1 | Status: SHIPPED | OUTPATIENT
Start: 2024-09-17

## 2024-09-23 ENCOUNTER — HOSPITAL ENCOUNTER (OUTPATIENT)
Dept: ULTRASOUND IMAGING | Facility: HOSPITAL | Age: 69
Discharge: HOME | End: 2024-09-23
Attending: FAMILY MEDICINE
Payer: MEDICARE

## 2024-09-23 DIAGNOSIS — K76.0 FATTY LIVER: ICD-10-CM

## 2024-09-23 PROCEDURE — 76981 USE PARENCHYMA: CPT

## 2024-09-30 ENCOUNTER — RA CDI HCC (OUTPATIENT)
Dept: OTHER | Facility: HOSPITAL | Age: 69
End: 2024-09-30

## 2024-10-07 ENCOUNTER — APPOINTMENT (OUTPATIENT)
Dept: RADIOLOGY | Facility: CLINIC | Age: 69
End: 2024-10-07
Payer: MEDICARE

## 2024-10-07 ENCOUNTER — OFFICE VISIT (OUTPATIENT)
Dept: FAMILY MEDICINE CLINIC | Facility: CLINIC | Age: 69
End: 2024-10-07
Payer: MEDICARE

## 2024-10-07 VITALS
HEIGHT: 60 IN | DIASTOLIC BLOOD PRESSURE: 78 MMHG | TEMPERATURE: 97.7 F | WEIGHT: 210 LBS | SYSTOLIC BLOOD PRESSURE: 130 MMHG | BODY MASS INDEX: 41.23 KG/M2 | HEART RATE: 68 BPM | OXYGEN SATURATION: 97 %

## 2024-10-07 DIAGNOSIS — Z12.31 BREAST CANCER SCREENING BY MAMMOGRAM: ICD-10-CM

## 2024-10-07 DIAGNOSIS — R20.0 LEG NUMBNESS: ICD-10-CM

## 2024-10-07 DIAGNOSIS — Z00.00 MEDICARE ANNUAL WELLNESS VISIT, SUBSEQUENT: Primary | ICD-10-CM

## 2024-10-07 DIAGNOSIS — E11.9 TYPE 2 DIABETES MELLITUS WITHOUT COMPLICATION, WITHOUT LONG-TERM CURRENT USE OF INSULIN (HCC): ICD-10-CM

## 2024-10-07 DIAGNOSIS — I10 PRIMARY HYPERTENSION: ICD-10-CM

## 2024-10-07 LAB — SL AMB POCT HEMOGLOBIN AIC: 7.2 (ref ?–6.5)

## 2024-10-07 PROCEDURE — 72110 X-RAY EXAM L-2 SPINE 4/>VWS: CPT

## 2024-10-07 PROCEDURE — 83036 HEMOGLOBIN GLYCOSYLATED A1C: CPT | Performed by: FAMILY MEDICINE

## 2024-10-07 PROCEDURE — G0439 PPPS, SUBSEQ VISIT: HCPCS | Performed by: FAMILY MEDICINE

## 2024-10-07 PROCEDURE — 99214 OFFICE O/P EST MOD 30 MIN: CPT | Performed by: FAMILY MEDICINE

## 2024-10-07 NOTE — ASSESSMENT & PLAN NOTE
Controlled. Continue current treatment.   Lab Results   Component Value Date    HGBA1C 7.2 (A) 10/07/2024       Orders:    POCT hemoglobin A1c
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
## 2024-10-07 NOTE — PATIENT INSTRUCTIONS
Medicare Preventive Visit Patient Instructions  Thank you for completing your Welcome to Medicare Visit or Medicare Annual Wellness Visit today. Your next wellness visit will be due in one year (10/8/2025).  The screening/preventive services that you may require over the next 5-10 years are detailed below. Some tests may not apply to you based off risk factors and/or age. Screening tests ordered at today's visit but not completed yet may show as past due. Also, please note that scanned in results may not display below.  Preventive Screenings:  Service Recommendations Previous Testing/Comments   Colorectal Cancer Screening  * Colonoscopy    * Fecal Occult Blood Test (FOBT)/Fecal Immunochemical Test (FIT)  * Fecal DNA/Cologuard Test  * Flexible Sigmoidoscopy Age: 45-75 years old   Colonoscopy: every 10 years (may be performed more frequently if at higher risk)  OR  FOBT/FIT: every 1 year  OR  Cologuard: every 3 years  OR  Sigmoidoscopy: every 5 years  Screening may be recommended earlier than age 45 if at higher risk for colorectal cancer. Also, an individualized decision between you and your healthcare provider will decide whether screening between the ages of 76-85 would be appropriate. Colonoscopy: 05/08/2023  FOBT/FIT: Not on file  Cologuard: Not on file  Sigmoidoscopy: Not on file    Screening Current     Breast Cancer Screening Age: 40+ years old  Frequency: every 1-2 years  Not required if history of left and right mastectomy Mammogram: 11/13/2023    Screening Current   Cervical Cancer Screening Between the ages of 21-29, pap smear recommended once every 3 years.   Between the ages of 30-65, can perform pap smear with HPV co-testing every 5 years.   Recommendations may differ for women with a history of total hysterectomy, cervical cancer, or abnormal pap smears in past. Pap Smear: 04/21/2021    Screening Not Indicated   Hepatitis C Screening Once for adults born between 1945 and 1965  More frequently in  patients at high risk for Hepatitis C Hep C Antibody: 05/18/2022    Screening Current   Diabetes Screening 1-2 times per year if you're at risk for diabetes or have pre-diabetes Fasting glucose: 149 mg/dL (5/29/2024)  A1C: 6.9 % (5/29/2024)  Screening Not Indicated  History Diabetes   Cholesterol Screening Once every 5 years if you don't have a lipid disorder. May order more often based on risk factors. Lipid panel: 05/29/2024    Screening Current     Other Preventive Screenings Covered by Medicare:  Abdominal Aortic Aneurysm (AAA) Screening: covered once if your at risk. You're considered to be at risk if you have a family history of AAA.  Lung Cancer Screening: covers low dose CT scan once per year if you meet all of the following conditions: (1) Age 55-77; (2) No signs or symptoms of lung cancer; (3) Current smoker or have quit smoking within the last 15 years; (4) You have a tobacco smoking history of at least 20 pack years (packs per day multiplied by number of years you smoked); (5) You get a written order from a healthcare provider.  Glaucoma Screening: covered annually if you're considered high risk: (1) You have diabetes OR (2) Family history of glaucoma OR (3)  aged 50 and older OR (4)  American aged 65 and older  Osteoporosis Screening: covered every 2 years if you meet one of the following conditions: (1) You're estrogen deficient and at risk for osteoporosis based off medical history and other findings; (2) Have a vertebral abnormality; (3) On glucocorticoid therapy for more than 3 months; (4) Have primary hyperparathyroidism; (5) On osteoporosis medications and need to assess response to drug therapy.   Last bone density test (DXA Scan): 04/01/2021.  HIV Screening: covered annually if you're between the age of 15-65. Also covered annually if you are younger than 15 and older than 65 with risk factors for HIV infection. For pregnant patients, it is covered up to 3 times per  pregnancy.    Immunizations:  Immunization Recommendations   Influenza Vaccine Annual influenza vaccination during flu season is recommended for all persons aged >= 6 months who do not have contraindications   Pneumococcal Vaccine   * Pneumococcal conjugate vaccine = PCV13 (Prevnar 13), PCV15 (Vaxneuvance), PCV20 (Prevnar 20)  * Pneumococcal polysaccharide vaccine = PPSV23 (Pneumovax) Adults 19-65 yo with certain risk factors or if 65+ yo  If never received any pneumonia vaccine: recommend Prevnar 20 (PCV20)  Give PCV20 if previously received 1 dose of PCV13 or PPSV23   Hepatitis B Vaccine 3 dose series if at intermediate or high risk (ex: diabetes, end stage renal disease, liver disease)   Respiratory syncytial virus (RSV) Vaccine - COVERED BY MEDICARE PART D  * RSVPreF3 (Arexvy) CDC recommends that adults 60 years of age and older may receive a single dose of RSV vaccine using shared clinical decision-making (SCDM)   Tetanus (Td) Vaccine - COST NOT COVERED BY MEDICARE PART B Following completion of primary series, a booster dose should be given every 10 years to maintain immunity against tetanus. Td may also be given as tetanus wound prophylaxis.   Tdap Vaccine - COST NOT COVERED BY MEDICARE PART B Recommended at least once for all adults. For pregnant patients, recommended with each pregnancy.   Shingles Vaccine (Shingrix) - COST NOT COVERED BY MEDICARE PART B  2 shot series recommended in those 19 years and older who have or will have weakened immune systems or those 50 years and older     Health Maintenance Due:      Topic Date Due   • Breast Cancer Screening: Mammogram  11/13/2024   • Colorectal Cancer Screening  05/06/2028   • Hepatitis C Screening  Completed   • Cervical Cancer Screening  Discontinued     Immunizations Due:      Topic Date Due   • Pneumococcal Vaccine: 65+ Years (3 of 3 - PPSV23 or PCV20) 02/25/2021   • Influenza Vaccine (1) 09/01/2024   • COVID-19 Vaccine (3 - 2023-24 season) 09/01/2024      Advance Directives   What are advance directives?  Advance directives are legal documents that state your wishes and plans for medical care. These plans are made ahead of time in case you lose your ability to make decisions for yourself. Advance directives can apply to any medical decision, such as the treatments you want, and if you want to donate organs.   What are the types of advance directives?  There are many types of advance directives, and each state has rules about how to use them. You may choose a combination of any of the following:  Living will:  This is a written record of the treatment you want. You can also choose which treatments you do not want, which to limit, and which to stop at a certain time. This includes surgery, medicine, IV fluid, and tube feedings.   Durable power of  for healthcare (DPAHC):  This is a written record that states who you want to make healthcare choices for you when you are unable to make them for yourself. This person, called a proxy, is usually a family member or a friend. You may choose more than 1 proxy.  Do not resuscitate (DNR) order:  A DNR order is used in case your heart stops beating or you stop breathing. It is a request not to have certain forms of treatment, such as CPR. A DNR order may be included in other types of advance directives.  Medical directive:  This covers the care that you want if you are in a coma, near death, or unable to make decisions for yourself. You can list the treatments you want for each condition. Treatment may include pain medicine, surgery, blood transfusions, dialysis, IV or tube feedings, and a ventilator (breathing machine).  Values history:  This document has questions about your views, beliefs, and how you feel and think about life. This information can help others choose the care that you would choose.  Why are advance directives important?  An advance directive helps you control your care. Although spoken wishes may  be used, it is better to have your wishes written down. Spoken wishes can be misunderstood, or not followed. Treatments may be given even if you do not want them. An advance directive may make it easier for your family to make difficult choices about your care.   Urinary Incontinence   Urinary incontinence (UI)  is when you lose control of your bladder. UI develops because your bladder cannot store or empty urine properly. The 3 most common types of UI are stress incontinence, urge incontinence, or both.  Medicines:   May be given to help strengthen your bladder control. Report any side effects of medication to your healthcare provider.  Do pelvic muscle exercises often:  Your pelvic muscles help you stop urinating. Squeeze these muscles tight for 5 seconds, then relax for 5 seconds. Gradually work up to squeezing for 10 seconds. Do 3 sets of 15 repetitions a day, or as directed. This will help strengthen your pelvic muscles and improve bladder control.  Train your bladder:  Go to the bathroom at set times, such as every 2 hours, even if you do not feel the urge to go. You can also try to hold your urine when you feel the urge to go. For example, hold your urine for 5 minutes when you feel the urge to go. As that becomes easier, hold your urine for 10 minutes.   Self-care:   Keep a UI record.  Write down how often you leak urine and how much you leak. Make a note of what you were doing when you leaked urine.  Drink liquids as directed. You may need to limit the amount of liquid you drink to help control your urine leakage. Do not drink any liquid right before you go to bed. Limit or do not have drinks that contain caffeine or alcohol.   Prevent constipation.  Eat a variety of high-fiber foods. Good examples are high-fiber cereals, beans, vegetables, and whole-grain breads. Walking is the best way to trigger your intestines to have a bowel movement.  Exercise regularly and maintain a healthy weight.  Weight loss and  exercise will decrease pressure on your bladder and help you control your leakage.   Use a catheter as directed  to help empty your bladder. A catheter is a tiny, plastic tube that is put into your bladder to drain your urine.   Go to behavior therapy as directed.  Behavior therapy may be used to help you learn to control your urge to urinate.    Weight Management   Why it is important to manage your weight:  Being overweight increases your risk of health conditions such as heart disease, high blood pressure, type 2 diabetes, and certain types of cancer. It can also increase your risk for osteoarthritis, sleep apnea, and other respiratory problems. Aim for a slow, steady weight loss. Even a small amount of weight loss can lower your risk of health problems.  How to lose weight safely:  A safe and healthy way to lose weight is to eat fewer calories and get regular exercise. You can lose up about 1 pound a week by decreasing the number of calories you eat by 500 calories each day.   Healthy meal plan for weight management:  A healthy meal plan includes a variety of foods, contains fewer calories, and helps you stay healthy. A healthy meal plan includes the following:  Eat whole-grain foods more often.  A healthy meal plan should contain fiber. Fiber is the part of grains, fruits, and vegetables that is not broken down by your body. Whole-grain foods are healthy and provide extra fiber in your diet. Some examples of whole-grain foods are whole-wheat breads and pastas, oatmeal, brown rice, and bulgur.  Eat a variety of vegetables every day.  Include dark, leafy greens such as spinach, kale, bailee greens, and mustard greens. Eat yellow and orange vegetables such as carrots, sweet potatoes, and winter squash.   Eat a variety of fruits every day.  Choose fresh or canned fruit (canned in its own juice or light syrup) instead of juice. Fruit juice has very little or no fiber.  Eat low-fat dairy foods.  Drink fat-free  (skim) milk or 1% milk. Eat fat-free yogurt and low-fat cottage cheese. Try low-fat cheeses such as mozzarella and other reduced-fat cheeses.  Choose meat and other protein foods that are low in fat.  Choose beans or other legumes such as split peas or lentils. Choose fish, skinless poultry (chicken or turkey), or lean cuts of red meat (beef or pork). Before you cook meat or poultry, cut off any visible fat.   Use less fat and oil.  Try baking foods instead of frying them. Add less fat, such as margarine, sour cream, regular salad dressing and mayonnaise to foods. Eat fewer high-fat foods. Some examples of high-fat foods include french fries, doughnuts, ice cream, and cakes.  Eat fewer sweets.  Limit foods and drinks that are high in sugar. This includes candy, cookies, regular soda, and sweetened drinks.  Exercise:  Exercise at least 30 minutes per day on most days of the week. Some examples of exercise include walking, biking, dancing, and swimming. You can also fit in more physical activity by taking the stairs instead of the elevator or parking farther away from stores. Ask your healthcare provider about the best exercise plan for you.      © Copyright [employer] 2018 Information is for End User's use only and may not be sold, redistributed or otherwise used for commercial purposes. All illustrations and images included in CareNotes® are the copyrighted property of [email or web address] or [employer]

## 2024-10-14 ENCOUNTER — TELEPHONE (OUTPATIENT)
Dept: FAMILY MEDICINE CLINIC | Facility: CLINIC | Age: 69
End: 2024-10-14

## 2024-10-14 DIAGNOSIS — E11.9 TYPE 2 DIABETES MELLITUS WITHOUT COMPLICATION, WITHOUT LONG-TERM CURRENT USE OF INSULIN (HCC): ICD-10-CM

## 2024-10-14 DIAGNOSIS — M51.9 LUMBAR DISC DISEASE: Primary | ICD-10-CM

## 2024-10-15 RX ORDER — SITAGLIPTIN 50 MG/1
50 TABLET, FILM COATED ORAL DAILY
Qty: 90 TABLET | Refills: 1 | Status: SHIPPED | OUTPATIENT
Start: 2024-10-15

## 2024-10-23 ENCOUNTER — TELEPHONE (OUTPATIENT)
Age: 69
End: 2024-10-23

## 2024-10-23 DIAGNOSIS — M51.9 LUMBAR DISC DISEASE: Primary | ICD-10-CM

## 2024-10-23 NOTE — TELEPHONE ENCOUNTER
Mri ordered as well as referral to pain and spine . Her insurance may require her to do the PT  but  she wont know until she calls and schedules MRI

## 2024-10-23 NOTE — TELEPHONE ENCOUNTER
Patient called in regards to she will be starting physical therapy next week on Tuesday but patient stated that her legs have got in worst. Patient stated that she can barely go up and down the stairs and her numbness has got in worst. Patient would like to know if she would have to do Physical therapy first before she can get an MRI done or can she get the MRI done.

## 2024-10-29 ENCOUNTER — EVALUATION (OUTPATIENT)
Dept: PHYSICAL THERAPY | Facility: CLINIC | Age: 69
End: 2024-10-29
Payer: MEDICARE

## 2024-10-29 DIAGNOSIS — R26.9 GAIT ABNORMALITY: ICD-10-CM

## 2024-10-29 DIAGNOSIS — M79.604 RIGHT LEG PAIN: ICD-10-CM

## 2024-10-29 DIAGNOSIS — M51.9 LUMBAR DISC DISEASE: Primary | ICD-10-CM

## 2024-10-29 PROCEDURE — 97110 THERAPEUTIC EXERCISES: CPT | Performed by: PHYSICAL MEDICINE & REHABILITATION

## 2024-10-29 PROCEDURE — 97161 PT EVAL LOW COMPLEX 20 MIN: CPT | Performed by: PHYSICAL MEDICINE & REHABILITATION
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
## 2024-10-30 ENCOUNTER — TELEPHONE (OUTPATIENT)
Age: 69
End: 2024-10-30

## 2024-10-30 NOTE — TELEPHONE ENCOUNTER
Called pt and half way through leaving  phone call was dropped. Sent a message to pt through my chart to ask her to call back to resched appt with Dr. Mills until after her MRI is done. It is scheduled for tomorrow afternoon. Would need to be scheduled close to a week out at next new patient opening.

## 2024-10-31 ENCOUNTER — HOSPITAL ENCOUNTER (OUTPATIENT)
Dept: MRI IMAGING | Facility: HOSPITAL | Age: 69
End: 2024-10-31
Payer: MEDICARE

## 2024-10-31 DIAGNOSIS — M51.9 LUMBAR DISC DISEASE: ICD-10-CM

## 2024-10-31 PROCEDURE — 72148 MRI LUMBAR SPINE W/O DYE: CPT

## 2024-11-03 DIAGNOSIS — Z78.0 POST-MENOPAUSAL: Primary | ICD-10-CM

## 2024-11-04 ENCOUNTER — TELEPHONE (OUTPATIENT)
Dept: FAMILY MEDICINE CLINIC | Facility: CLINIC | Age: 69
End: 2024-11-04

## 2024-11-04 NOTE — TELEPHONE ENCOUNTER
Eugene Acevedo MD  11/3/2024  4:49 PM EST       Please call patient and let her know her MRI showed multiple levels of degeneration.  This was expected given her x-ray findings and symptoms.  For this I got a new DEXA scan recommend to follow-up with pain management and I see she has an appointment coming up.  The MRI also showed loss of disc height.  This can sometimes indicate osteoporosis.  Her last DEXA scan was in 2021 it was normal.  I think it would be good idea if she got a new DEXA scan.  I ordered  one and she can do this  along with her mammogram which she is due for.  They can be done at the same time.

## 2024-11-04 NOTE — TELEPHONE ENCOUNTER
A. Relayed results to patient as per provider message. Patient expressed understanding and did not have any further questions.

## 2024-11-05 ENCOUNTER — OFFICE VISIT (OUTPATIENT)
Dept: PHYSICAL THERAPY | Facility: CLINIC | Age: 69
End: 2024-11-05
Payer: MEDICARE

## 2024-11-05 DIAGNOSIS — M79.604 RIGHT LEG PAIN: ICD-10-CM

## 2024-11-05 DIAGNOSIS — R26.9 GAIT ABNORMALITY: ICD-10-CM

## 2024-11-05 DIAGNOSIS — M51.9 LUMBAR DISC DISEASE: Primary | ICD-10-CM

## 2024-11-05 PROCEDURE — 97110 THERAPEUTIC EXERCISES: CPT | Performed by: PHYSICAL MEDICINE & REHABILITATION

## 2024-11-05 PROCEDURE — 97140 MANUAL THERAPY 1/> REGIONS: CPT | Performed by: PHYSICAL MEDICINE & REHABILITATION

## 2024-11-05 NOTE — PROGRESS NOTES
Daily Note     Today's date: 2024  Patient name: Hung Doan  : 1955  MRN: [id number]  Referring provider: Eugene Acevedo, *  Dx:   Encounter Diagnosis     ICD-10-CM    1. Lumbar disc disease  M51.9       2. Right leg pain  M79.604       3. Gait abnormality  R26.9                      Subjective: Pt notes no new sx/complaints. Wore birkenstocks sat/ with notably less sx into RLE. Notes cont n/t R lateral thigh/lower leg. Worse wearing her Freire sneakers; better with birkenstocks. No new pain/sx. No worse overall. Has noticed for a long time that she tends to lean more to R side; R shoulder lower than L.       Objective: See treatment diary below      Assessment: Tolerated treatment well. Initiated tx without incident. Fatigued with standing SLRs with RLE, however pt notes she felt she was able to lift RLE higher during second set vs first set. Noted twinges of pain R hip/glut/thigh/leg with prolonged standing with exercises; no sx in supine. No sx with sciatic sliders supine. Noted LLD in supine with RLE significantly shorter than LLE; also note lateral flex to R in standing and pelvic drop on R vs L side in standing. Trialed 1/4 in heel lift in R shoe; pt noted no unusual pain/sx or increase in sx with heel lift in place after session; improved symmetry of gait/posture noted with heel lift in place; pt to trial lift and if increase in pain/sx noted, she will remove from R shoe. Pt noted reduced sx with LAD supine RLE with MT. Overall less sx R LE after tx; able to flex/ABD/ER R hip to get shoe on/off without pain and with greater ROM/mobility after tx which is new/improved for her. No complaints after session. Patient demonstrated fatigue post treatment and would benefit from continued PT      Plan: Continue per plan of care.  Progress treatment as tolerated.       Precautions: HTN, DM, anxiety/depression     Re-eval Date:      Date 10/29/2024   11/5      Visit Count 1/10 2/10     "  FOTO 10/29       Pain In 6/10 6-7/10      Pain Out 6/10 4-5/10            Manuals 10/29/2024   11/5      R LE LAD as abel   Supine  8' as abel                               Neuro Re-Ed        TA training      TA with iso Hip ADD/ABD        TA with BKFO      TA with heel slide        PPT         Pallof press             Iso core activation with Pball                         Ther Ex        Nustep- B hip/LE ROM and conditioning   L 3 10'       HS stretch          Hip flexor stretch   Reviewed HS and calf stretching for HEP  Calf stretch wedge standing 4x30\"       Piriformis stretch   Supine sciatic sliders 10x      LTR              SLRs - standing-->mat table     Standing R SLR flex  2-3x10 0#    Standing hip ABD SLR R 0# 2-3x10     Hrs 2x10-15       Clamshells      Mini squats        Step ups        Leg press         Knee ext        HS curls         Ther Activity pt education regarding pathophysiology/pathoanatomy of present pain/sx and condition, role of PT in improving pain/sx and function, pt education regarding activity modification to avoid exacerbation of sx and delayed recovery   Trail 1/4in heel lift on R 2* noted LLD and postural deviations                       Gait Training                        Modalities prn                               "

## 2024-11-07 ENCOUNTER — OFFICE VISIT (OUTPATIENT)
Dept: PHYSICAL THERAPY | Facility: CLINIC | Age: 69
End: 2024-11-07
Payer: MEDICARE

## 2024-11-07 DIAGNOSIS — M51.9 LUMBAR DISC DISEASE: Primary | ICD-10-CM

## 2024-11-07 DIAGNOSIS — M79.604 RIGHT LEG PAIN: ICD-10-CM

## 2024-11-07 DIAGNOSIS — R26.9 GAIT ABNORMALITY: ICD-10-CM

## 2024-11-07 PROCEDURE — 97112 NEUROMUSCULAR REEDUCATION: CPT

## 2024-11-07 PROCEDURE — 97110 THERAPEUTIC EXERCISES: CPT

## 2024-11-07 NOTE — PROGRESS NOTES
"Daily Note     Today's date: 2024  Patient name: Hung Doan  : 1955  MRN: [id number]  Referring provider: Eugene Acevedo, *  Dx:   Encounter Diagnosis     ICD-10-CM    1. Lumbar disc disease  M51.9       2. Right leg pain  M79.604       3. Gait abnormality  R26.9           Start Time: 1450  Stop Time: 1545  Total time in clinic (min): 55 minutes    Subjective: \"I am feeling so much better with the lift in my shoe.  I am very active and go to 2 exercises classes and do all my exercises at home.  I do have a hard time lifting my R leg up, it feels very heavy.\"      Objective: See treatment diary below      Assessment: Tolerated treatment well. Able to complete program without incident.  Added core stabilization exercises with good tolerance, quick muscle fatigue noted.  Slight decrease in pain levels, improved gait noted.  Will continue to monitor and progress as able.  Patient demonstrated fatigue post treatment and would benefit from continued PT      Plan: Continue per plan of care.  Progress treatment as tolerated.       Precautions: HTN, DM, anxiety/depression     Re-eval Date:      Date 10/29/2024   11/5 11/7     Visit Count 1/10 2/10 3/10     FOTO 10/29       Pain In 6/10 6-7/10 4/10     Pain Out 6/10 4-5/10 3-4/10           Manuals 10/29/2024   11/5 11/7     R LE LAD as abel   Supine  8' as abel                               Neuro Re-Ed        TA training      TA with iso Hip ADD/ABD   15x/3-5\"      20x/3-5\"     TA with BKFO      TA with heel slide   15x/3-5\"     PPT         Pallof press             Iso core activation with Pball                         Ther Ex        Nustep- B hip/LE ROM and conditioning   L 3 10'  L 3 10'      HS stretch          Hip flexor stretch   Reviewed HS and calf stretching for HEP  Calf stretch wedge standing 4x30\"  Calf stretch wedge standing 4x30\"      Piriformis stretch   Supine sciatic sliders 10x      LTR              SLRs - standing-->mat table   "   Standing R SLR flex  2-3x10 0#    Standing hip ABD SLR R 0# 2-3x10     Hrs 2x10-15    Standing R SLR flex  2-3x10 0#    Standing hip ABD SLR R 0# 2-3x10     Hrs 2x10-15      Clamshells      Mini squats        Step ups        Leg press         Knee ext        HS curls         Ther Activity pt education regarding pathophysiology/pathoanatomy of present pain/sx and condition, role of PT in improving pain/sx and function, pt education regarding activity modification to avoid exacerbation of sx and delayed recovery   Trail 1/4in heel lift on R 2* noted LLD and postural deviations                       Gait Training                        Modalities prn

## 2024-11-11 ENCOUNTER — HOSPITAL ENCOUNTER (OUTPATIENT)
Dept: BONE DENSITY | Facility: HOSPITAL | Age: 69
Discharge: HOME | End: 2024-11-11
Payer: MEDICARE

## 2024-11-11 VITALS — BODY MASS INDEX: 34.99 KG/M2 | WEIGHT: 210 LBS | HEIGHT: 65 IN

## 2024-11-11 DIAGNOSIS — Z78.0 POST-MENOPAUSAL: ICD-10-CM

## 2024-11-11 PROCEDURE — 77080 DXA BONE DENSITY AXIAL: CPT

## 2024-11-12 ENCOUNTER — OFFICE VISIT (OUTPATIENT)
Dept: PHYSICAL THERAPY | Facility: CLINIC | Age: 69
End: 2024-11-12
Payer: MEDICARE

## 2024-11-12 DIAGNOSIS — M79.604 RIGHT LEG PAIN: ICD-10-CM

## 2024-11-12 DIAGNOSIS — M51.9 LUMBAR DISC DISEASE: Primary | ICD-10-CM

## 2024-11-12 PROCEDURE — 97110 THERAPEUTIC EXERCISES: CPT | Performed by: PHYSICAL MEDICINE & REHABILITATION

## 2024-11-12 NOTE — PROGRESS NOTES
"Daily Note     Today's date: 2024  Patient name: Hung Doan  : 1955  MRN: [id number]  Referring provider: Eugene Acevedo, *  Dx:   Encounter Diagnosis     ICD-10-CM    1. Lumbar disc disease  M51.9       2. Right leg pain  M79.604                      Subjective: Pt notes she was hurting last week between her exercise classes, PT, and going up/down the steps watching her grandkids. Did her exercises at home /HEP without issue/incident. Still feels heaviness of RLE at times; not worsening vs. Baseline. No new pain or n/t. Gets pulling pain lateral R thigh/hip at times per pt. Still going to her exercise classes. Feels the heel lift may be helping so far. Sees PM on thurs..       Objective: See treatment diary below      Assessment: Tolerated treatment well. Pt with significant improvement in sx R LE after tx. Pt also noted improved ability to lift R LE with SLR after session vs before session today with greater ease/ less fatigue. No adverse reaction to tx. Noted improvement in pt's gait with heel lift in place; less lateral flex to R with R stance. Cont with strength/endurance deficits of R thigh and hip/glut. HEP reviewed. No complaints after tx.   Patient demonstrated fatigue post treatment and would benefit from continued PT      Plan: Continue per plan of care.  Progress treatment as tolerated.       Precautions: HTN, DM, anxiety/depression     Re-eval Date:      Date 10/29/2024   11/5 11/7 11/12    Visit Count 1/10 2/10 3/10 4/10    FOTO 10/29       Pain In 6/10 6-7/10 4/10 6-7/10    Pain Out 6/10 4-5/10 3-4/10 3-4/10           Manuals 10/29/2024   11/5 11/7 11/12    R LE LAD as abel   Supine  8' as abel   3-5' supine R E LAD                             Neuro Re-Ed        TA training      TA with iso Hip ADD/ABD   15x/3-5\"      20x/3-5\"     TA with BKFO      TA with heel slide   15x/3-5\"     PPT         Pallof press             Iso core activation with Pball                         Ther " "Ex        Nustep- B hip/LE ROM and conditioning   L 3 10'  L 3 10'  L4 10'     HS stretch          Hip flexor stretch   Reviewed HS and calf stretching for HEP  Calf stretch wedge standing 4x30\"  Calf stretch wedge standing 4x30\"  Calf stretch wedge standing 4x30\"       Standing HS stretch  Bottom step  4-5x30\"     Piriformis stretch   Supine sciatic sliders 10x  Piriformis stretch   4x30\"     LTR              SLRs - standing-->mat table     Standing R SLR flex  2-3x10 0#    Standing hip ABD SLR R 0# 2-3x10     Hrs 2x10-15    Standing R SLR flex  2-3x10 0#    Standing hip ABD SLR R 0# 2-3x10     Hrs 2x10-15  Standing SLR flex/abd 0#  2x10 ea 3-5\"      SL hip ABD 0# SLR 2x10 R       Hrs 2x10/5\"    Clamshells      Mini squats    SL R 0# 2x10       2x10/5\" cues/feedback    Step ups        Leg press     NV    Knee ext        HS curls         Ther Activity pt education regarding pathophysiology/pathoanatomy of present pain/sx and condition, role of PT in improving pain/sx and function, pt education regarding activity modification to avoid exacerbation of sx and delayed recovery   Trail 1/4in heel lift on R 2* noted LLD and postural deviations                       Gait Training                        Modalities prn                                   "

## 2024-11-14 ENCOUNTER — CONSULT (OUTPATIENT)
Age: 69
End: 2024-11-14
Payer: MEDICARE

## 2024-11-14 ENCOUNTER — OFFICE VISIT (OUTPATIENT)
Dept: PHYSICAL THERAPY | Facility: CLINIC | Age: 69
End: 2024-11-14
Payer: MEDICARE

## 2024-11-14 VITALS
WEIGHT: 210 LBS | HEIGHT: 65 IN | SYSTOLIC BLOOD PRESSURE: 118 MMHG | HEART RATE: 66 BPM | DIASTOLIC BLOOD PRESSURE: 65 MMHG | BODY MASS INDEX: 34.99 KG/M2

## 2024-11-14 DIAGNOSIS — M25.551 RIGHT HIP PAIN: Primary | ICD-10-CM

## 2024-11-14 DIAGNOSIS — R26.9 GAIT ABNORMALITY: ICD-10-CM

## 2024-11-14 DIAGNOSIS — M51.9 LUMBAR DISC DISEASE: ICD-10-CM

## 2024-11-14 DIAGNOSIS — G89.4 CHRONIC PAIN SYNDROME: ICD-10-CM

## 2024-11-14 DIAGNOSIS — M79.604 RIGHT LEG PAIN: ICD-10-CM

## 2024-11-14 DIAGNOSIS — M51.9 LUMBAR DISC DISEASE: Primary | ICD-10-CM

## 2024-11-14 PROCEDURE — 99204 OFFICE O/P NEW MOD 45 MIN: CPT | Performed by: ANESTHESIOLOGY

## 2024-11-14 PROCEDURE — 97140 MANUAL THERAPY 1/> REGIONS: CPT | Performed by: PHYSICAL MEDICINE & REHABILITATION

## 2024-11-14 PROCEDURE — G2211 COMPLEX E/M VISIT ADD ON: HCPCS | Performed by: ANESTHESIOLOGY

## 2024-11-14 PROCEDURE — 97110 THERAPEUTIC EXERCISES: CPT | Performed by: PHYSICAL MEDICINE & REHABILITATION

## 2024-11-14 NOTE — PROGRESS NOTES
Assessment:  1. Right hip pain    2. Lumbar disc disease        Plan:  Patient is a 69-year-old female complains of right-sided leg pain with chronic pain syndrome secondary to right hip pain, lumbar radiculopathy, lumbar degenerative disease presents to office for follow-up visit.  MRI lumbar spine is reviewed with patient and appears that the MRI does not correlate correctly with patient's current presentation of right radicular symptoms.  Patient describes difficulty lifting her right leg in a hip flexion motion which often results with weakness and numbness and pain.  From physical exam there is definitely decreased range of motion of the right hip.  1.  We will order an x-ray of the right hip  2.  Follow-up in 1 month to review imaging and plan interventional management.  If x-rays within normal limits will consider MRI of the right hip due to possible labral tear.        History of Present Illness:    The patient is a 69 y.o. female who presents for consultation in regards to Hip Pain and Leg Pain.  Symptoms have been present for 2 months. Symptoms began without any precipitating injury or trauma. Pain is reported to be 6 on the numeric rating scale.  Symptoms are felt intermittently and worst in the no typical pattern.  Symptoms are characterized as shooting, numbing, and throbbing.  Symptoms are associated with right leg weakness.  Aggravating factors include lying down, sitting, walking, and exercise.  Relieving factors include nothing.  No change in symptoms with kneeling, standing, bending, leaning forward, leaning bckward, turning the head, relaxation, coughing/sneezing, and bowel movements.  Treatments that have been helpful include physical therapy and home exercise. Medications to relieve symptoms include meloxicam.    Review of Systems:    Review of Systems   Constitutional:  Positive for unexpected weight change. Negative for chills, fatigue and fever.   HENT:  Negative for hearing loss, sinus pain,  sore throat and trouble swallowing.    Eyes:  Negative for pain and visual disturbance.   Respiratory:  Negative for shortness of breath and wheezing.    Cardiovascular:  Negative for chest pain and palpitations.   Gastrointestinal:  Negative for abdominal pain, constipation and nausea.   Endocrine: Positive for polyphagia and polyuria. Negative for polydipsia.   Genitourinary:  Negative for difficulty urinating.   Musculoskeletal:  Negative for arthralgias, gait problem, joint swelling and myalgias.   Skin:  Negative for rash.   Neurological:  Positive for numbness. Negative for dizziness, weakness and headaches.   Hematological:  Does not bruise/bleed easily.   Psychiatric/Behavioral:  Negative for dysphoric mood. The patient is nervous/anxious.    All other systems reviewed and are negative.      Past Medical History:   Diagnosis Date    Anxiety     Carcinoma (HCC)     carcinoma of the skin    Chronic pain disorder     Right ankle fracture    Depression     Diabetes mellitus (HCC)     Type 2    Diverticulosis     GERD (gastroesophageal reflux disease)     Heart murmur     mitral valve insufficiency    Hypertension     Tenosynovitis, de Quervain     Vertigo     last assessed 8/7/2012       Past Surgical History:   Procedure Laterality Date    CHOLECYSTECTOMY      COLONOSCOPY      COLPOSCOPY W/ BIOPSY / CURETTAGE      cervix. last assessed 8/11/1998    ENDOMETRIAL BIOPSY      onset 4/13/2011    FOOT SURGERY      R foot and ankle fracture, fused with screws    HYSTEROSCOPY W/ POLYPECTOMY      IA COLONOSCOPY FLX DX W/COLLJ SPEC WHEN PFRMD N/A 6/20/2017    Procedure: COLONOSCOPY;  Surgeon: Darshana Dennis MD;  Location: [facility];  Service: General    TONSILLECTOMY         Family History   Problem Relation Age of Onset    Breast cancer Mother         Breast surgery mastectomy    Dementia Mother     Colon cancer Father     Liver cancer Father        Social History     Occupational History    Not on file   Tobacco Use  "   Smoking status: Never     Passive exposure: Yes    Smokeless tobacco: Never    Tobacco comments:     as per [employer] second hand  smoke exosure    quit june 2016   Vaping Use    Vaping status: Never Used   Substance and Sexual Activity    Alcohol use: Yes     Alcohol/week: 1.0 standard drink of alcohol     Types: 1 Cans of beer per week     Comment: once a week    Drug use: No    Sexual activity: Not Currently     Partners: Male         Current Outpatient Medications:     Bimatoprost (LUMIGAN OP), Apply 1 drop to eye daily at bedtime, Disp: , Rfl:     cetirizine (ZyrTEC) 10 mg tablet, Take 10 mg by mouth daily, Disp: , Rfl:     cholecalciferol (VITAMIN D3) 400 units tablet, Take 400 Units by mouth daily, Disp: , Rfl:     cyanocobalamin (VITAMIN B-12) 100 mcg tablet, Take by mouth daily, Disp: , Rfl:     famotidine (PEPCID) 20 mg tablet, Take 20 mg by mouth daily as needed , Disp: , Rfl:     fluticasone (FLONASE) 50 mcg/act nasal spray, 2 sprays into each nostril daily, Disp: 48 g, Rfl: 1    hydroCHLOROthiazide 25 mg tablet, Take 1 tablet (25 mg total) by mouth daily, Disp: 90 tablet, Rfl: 1    Januvia 50 MG tablet, TAKE 1 TABLET DAILY, Disp: 90 tablet, Rfl: 1    losartan (COZAAR) 100 MG tablet, Take 1 tablet by mouth once daily, Disp: 90 tablet, Rfl: 1    Metamucil Fiber CHEW, Chew, Disp: , Rfl:     Multiple Vitamins-Minerals (MULTIVITAMIN ADULT PO), Take by mouth, Disp: , Rfl:     sertraline (ZOLOFT) 50 mg tablet, TAKE 1 TABLET BY MOUTH IN THE MORNING, Disp: 90 tablet, Rfl: 1    verapamil (CALAN-SR) 120 mg CR tablet, TAKE 1 TABLET BY MOUTH ONCE DAILY AT BEDTIME, Disp: 90 tablet, Rfl: 1    Azelastine HCl 137 MCG/SPRAY SOLN, USE 1 SPRAY(S) IN EACH NOSTRIL TWICE DAILY AS DIRECTED (Patient not taking: No sig reported), Disp: 30 mL, Rfl: 0    No Known Allergies    Physical Exam:    /65   Pulse 66   Ht 5' 5\" (1.651 m)   Wt 95.3 kg (210 lb)   LMP  (LMP Unknown)   BMI 34.95 kg/m²     Constitutional: " normal, well developed, well nourished, alert, in no distress and non-toxic and no overt pain behavior. and obese  Eyes: anicteric  HEENT: grossly intact  Neck: supple, symmetric, trachea midline and no masses   Pulmonary:even and unlabored  Cardiovascular:No edema or pitting edema present  Skin:Normal without rashes or lesions and well hydrated  Psychiatric:Mood and affect appropriate  Neurologic:Cranial Nerves II-XII grossly intact  Musculoskeletal:antalgic    Lumbar/Sacral Spine examination demonstrates.  Decreased range of motion lumbar spine with pain upon: flexion, lateral rotation to the left/right, and bending to the left/right.  Bilateral lumbar paraspinals tender to palpation. Muscle spasms noted in the lumbar area bilaterally. 4/5 right lower extremity strength in all muscle groups. Positive seated straight leg raise for bilateral lower extremities.  Sensitivity to light touch intact bilateral lower extremities. 2+ reflexes in the patella and Achilles.  No ankle clonus    Imaging  No orders to display       No orders of the defined types were placed in this encounter.

## 2024-11-14 NOTE — PROGRESS NOTES
"Daily Note     Today's date: 2024  Patient name: Hung Doan  : 1955  MRN: [id number]  Referring provider: Eugene Acevedo, *  Dx:   Encounter Diagnosis     ICD-10-CM    1. Lumbar disc disease  M51.9       2. Right leg pain  M79.604       3. Gait abnormality  R26.9                      Subjective: Pt notes she felt \"really good most of the day yesterday\"; notes she was \"really surprised\"; did her PT exercises and su chi; felt good. However, woke up with her \"usual\"/baseline sx. 1* c/o heaviness R LE and discomfort R hip. No new pain/sx. No red flags, no progressive weakness, no numbness, etc. F/u with PM today. Pt feels small heel lift has really been helping her. Just came from her exercise class.       Objective: See treatment diary below      Assessment: Tolerated treatment well. Tolerated tx well without incident. Pt indicates improved strength/less heaviness/fatigue of R LE with PREs. Weakness noted R hip with SL hip ABD; mm fatigue only. Challenged with R step ups with mm fatigue only. R hip/glut ache/fatigue after SLRs. Resolved with rest and with LAD R hip/LE supine. Overall less pain/sx after session. No red flag signs/sx noted. Cont improvement in gait with less lateral flex noted with heel lift in place. No adverse reaction to tx noted. HEP reviewed. Patient demonstrated fatigue post treatment and would benefit from continued PT      Plan: Continue per plan of care.  Progress treatment as tolerated.       Precautions: HTN, DM, anxiety/depression     Re-eval Date:      Date 10/29/2024   11/5 11/7 11/12 11/14   Visit Count 1/10 2/10 3/10 4/10 5/10   FOTO 10/29       Pain In 6/10 6-7/10 4/10 6-7/10 6/10   Pain Out 6/10 4-5/10 3-4/10 3-4/10  4/10         Manuals 10/29/2024   11/5 11/7 11/12 11/14   R LE LAD as abel   Supine  8' as abel   3-5' supine R E LAD  8' Supine intermittent LAD R LE                            Neuro Re-Ed        TA training      TA with iso Hip ADD/ABD   " "15x/3-5\"      20x/3-5\"     TA with BKFO      TA with heel slide   15x/3-5\"     PPT         Pallof press             Iso core activation with Pball                         Ther Ex        Nustep- B hip/LE ROM and conditioning   L 3 10'  L 3 10'  L4 10'  L4 10'    HS stretch          Hip flexor stretch   Reviewed HS and calf stretching for HEP  Calf stretch wedge standing 4x30\"  Calf stretch wedge standing 4x30\"  Calf stretch wedge standing 4x30\"       Standing HS stretch  Bottom step  4-5x30\"  Calf stretch wedge standing 4x30\"       Standing HS stretch  Bottom step  4-5x30\"    Piriformis stretch   Supine sciatic sliders 10x  Piriformis stretch   4x30\"  Piriformis stretch   4x30\"    LTR              SLRs - standing-->mat table     Standing R SLR flex  2-3x10 0#    Standing hip ABD SLR R 0# 2-3x10     Hrs 2x10-15    Standing R SLR flex  2-3x10 0#    Standing hip ABD SLR R 0# 2-3x10     Hrs 2x10-15  Standing SLR flex/abd 0#  2x10 ea 3-5\"      SL hip ABD 0# SLR 2x10 R       Hrs 2x10/5\" Supine   SLR flex  0#  2x10 R  3-5\"      SL hip ABD 0# SLR 2x10 R    Clamshells      Mini squats    SL R 0# 2x10       2x10/5\" cues/feedback SL  R 0# 2x10       2x10/5\" cues/feedback   Step ups        Leg press     NV 6 in R 2x10       NV   Knee ext        HS curls         Ther Activity pt education regarding pathophysiology/pathoanatomy of present pain/sx and condition, role of PT in improving pain/sx and function, pt education regarding activity modification to avoid exacerbation of sx and delayed recovery   Trail 1/4in heel lift on R 2* noted LLD and postural deviations                       Gait Training                        Modalities prn                                     "

## 2024-11-14 NOTE — PATIENT INSTRUCTIONS
Hip Joint Injection      What is the hip joint and why is a hip joint injection helpful?  The hip joint is a large joint where the leg joins the pelvis. It usually hurts from arthritis although there are other less common causes of pain from this joint. When the joint becomes painful it can cause pain in its immediate region or it can refer pain into your groin, buttock or leg.  This injection helps to confirm or deny the joint as a source of the pain and treats any inflammatory component that may exist. The injection can be helpful as a treatment of hip osteoarthritis. It also may be used to guide surgical options.    What happens during the procedure?  The skin over the front of your hip will be well cleaned while lying on your back. The physician will numb a small area of skin which may sting for a few seconds. Next, the physician will use X-ray guidance to direct a very small needle into the joint, and then he will inject several drops of contrast dye to confirm that the medicine goes into the joint. Then, a small amount of numbing medicine and anti-inflammatory cortisone will be slowly injected.    What happens after the procedure?  A dressing may be applied to the injection site. You will remain in the office for about 15 to 20 minutes and the nurse will monitor your blood pressure and pulse. The nurse will review your discharge instructions and you will be able to go home. You may experience numbness or weakness to the affected limb for a few hours after the procedure. If this happens do not walk without assistance. Your physician may refer you to a physical therapist while the anti-inflammatory steroid is still working.    General Pre/Post Instructions  Eating  Medications  Exercise  Eating  You may eat a light, but not full meal at least one hour before the procedure, unless receiving intravenous sedation. If you are an insulin dependent diabetic do not alter your normal food intake.   Medications  Take  your routine medications before the procedure (such as high blood pressure and diabetes medications) except for those that need to be discontinued five days before the procedure such as aspirin and all anti-inflammatory medications (e.g. Motrin/Ibuprofen, Aleve, Relafen, Daypro). These medicines may be re-started the day after the procedure. You may take your regular pain medicine as needed before/after the procedure. If you are taking Coumadin, Heparin, Lovenox, Plavix or Ticlid you must notify the office so that the timing of stopping these medications can be explained.   Exercise  You must bring a  with you. You may return to your current level of activities the next day including return to work.     Things that may Delay the Procedure  If you are on antibiotics please notify our office; we may delay the procedure. If you have an active infection or fever we will not do the procedure.

## 2024-11-19 ENCOUNTER — OFFICE VISIT (OUTPATIENT)
Dept: PHYSICAL THERAPY | Facility: CLINIC | Age: 69
End: 2024-11-19
Payer: MEDICARE

## 2024-11-19 DIAGNOSIS — M51.9 LUMBAR DISC DISEASE: Primary | ICD-10-CM

## 2024-11-19 DIAGNOSIS — R26.9 GAIT ABNORMALITY: ICD-10-CM

## 2024-11-19 DIAGNOSIS — M79.604 RIGHT LEG PAIN: ICD-10-CM

## 2024-11-19 PROCEDURE — 97110 THERAPEUTIC EXERCISES: CPT | Performed by: PHYSICAL MEDICINE & REHABILITATION

## 2024-11-19 NOTE — PROGRESS NOTES
"Daily Note     Today's date: 2024  Patient name: Hung Doan  : 1955  MRN: [id number]  Referring provider: Eugene Acevedo, *  Dx:   Encounter Diagnosis     ICD-10-CM    1. Lumbar disc disease  M51.9       2. Right leg pain  M79.604       3. Gait abnormality  R26.9                      Subjective: Pt notes on Sat, she put her old Freire sneakers on to take care of leaves. Notes by Sat night, she felt improvement in her R LE sx; less hip pain; less n/t. Has cont to wear these sneakers and notes cont'd improvement in her sx RLE. Also feels better in her Birkenstocks. Gets the pain back/worse with her newer sneakers. Notes her old Freire are 5 years old but seem to help her since wearing them. No new pain/sx/complaints. Feels her R LE is getting stronger. No complaints with exercise program to date. No progressive sx. Overall feeling better since this weekend.       Objective: See treatment diary below      Assessment: Tolerated treatment well. No increased pain/sx with or following tx. Improving strength with PREs R hip vs earlier session. PT looked up pt's Freire sneakers; appears to be more of a motion control shoe for \"severe pronators\"; pt notes she feels the best in these shoes. PT analyzed pt's gait unshod; PT notes overpronation B, however L noted to be > R with increased toe out on L > R; this correlates with pt's LLD with LLE > RLE. Advised pt she may benefit from CMOs 2* reported sx benefit in more supportive/controlling sneakers; pt notes she will think about it. No complaints after tx. Patient demonstrated fatigue post treatment and would benefit from continued PT      Plan: Continue per plan of care.  Progress treatment as tolerated.       Precautions: HTN, DM, anxiety/depression     Re-eval Date:      Date    Visit Count 6/10 2/10 3/10 /10 5/10   FOTO NV       Pain In 3/10 6-7/10 4/10 6-7/10 6/10   Pain Out 1/10 4-5/10 3-4/10 3-4/10  4/10 " "        Manuals 11/19 11/5 11/7 11/12 11/14   R LE LAD as abel   Supine  8' as abel   3-5' supine R E LAD  8' Supine intermittent LAD R LE                            Neuro Re-Ed        TA training      TA with iso Hip ADD/ABD   15x/3-5\"      20x/3-5\"     TA with BKFO      TA with heel slide   15x/3-5\"     PPT         Pallof press             Iso core activation with Pball                         Ther Ex        Nustep- B hip/LE ROM and conditioning  L4 10'  L 3 10'  L 3 10'  L4 10'  L4 10'    HS stretch          Hip flexor stretch   Calf stretch wedge standing 4x30\"       Standing HS stretch  Bottom step  4-5x30\"  Calf stretch wedge standing 4x30\"  Calf stretch wedge standing 4x30\"  Calf stretch wedge standing 4x30\"       Standing HS stretch  Bottom step  4-5x30\"  Calf stretch wedge standing 4x30\"       Standing HS stretch  Bottom step  4-5x30\"    Piriformis stretch  Piriformis stretch   4x30\"  Supine sciatic sliders 10x  Piriformis stretch   4x30\"  Piriformis stretch   4x30\"    LTR              SLRs - standing-->mat table  Supine   SLR flex  0#  2x10 R  3-5\"      SL hip ABD 0# SLR 2x10 R   Standing R SLR flex  2-3x10 0#    Standing hip ABD SLR R 0# 2-3x10     Hrs 2x10-15    Standing R SLR flex  2-3x10 0#    Standing hip ABD SLR R 0# 2-3x10     Hrs 2x10-15  Standing SLR flex/abd 0#  2x10 ea 3-5\"      SL hip ABD 0# SLR 2x10 R       Hrs 2x10/5\" Supine   SLR flex  0#  2x10 R  3-5\"      SL hip ABD 0# SLR 2x10 R    Clamshells      Mini squats SL  R 0# 2x10       2x10/5\" cues/feedback   SL R 0# 2x10       2x10/5\" cues/feedback SL  R 0# 2x10       2x10/5\" cues/feedback   Step ups        Leg press  6 in R 2x10     Hrs standing 2x10     Reviewed Trs standing    NV 6 in R 2x10       NV   Knee ext        HS curls         Ther Activity pt education regarding pathophysiology/pathoanatomy of present pain/sx and condition, role of PT in improving pain/sx and function, pt education regarding activity modification to avoid " exacerbation of sx and delayed recovery   Trail 1/4in heel lift on R 2* noted LLD and postural deviations                       Gait Training                        Modalities prn

## 2024-11-21 ENCOUNTER — OFFICE VISIT (OUTPATIENT)
Dept: PHYSICAL THERAPY | Facility: CLINIC | Age: 69
End: 2024-11-21
Payer: MEDICARE

## 2024-11-21 DIAGNOSIS — M79.604 RIGHT LEG PAIN: ICD-10-CM

## 2024-11-21 DIAGNOSIS — M51.9 LUMBAR DISC DISEASE: Primary | ICD-10-CM

## 2024-11-21 DIAGNOSIS — R26.9 GAIT ABNORMALITY: ICD-10-CM

## 2024-11-21 PROCEDURE — 97110 THERAPEUTIC EXERCISES: CPT | Performed by: PHYSICAL MEDICINE & REHABILITATION

## 2024-11-21 NOTE — PROGRESS NOTES
"Daily Note     Today's date: 2024  Patient name: Hung Doan  : 1955  MRN: [id number]  Referring provider: Eugene Acevedo, *  Dx:   Encounter Diagnosis     ICD-10-CM    1. Lumbar disc disease  M51.9       2. Right leg pain  M79.604       3. Gait abnormality  R26.9                      Subjective: Pt notes she went to her exercise class today. Overall no new sx/complaints. Still gets pains into R thigh/knee. Feels \"numbness\" R HS/back of R thigh/lower leg; not new or worsening. No progressive/worsening sx. No red flag sx. Overall feels better since wearing her Freire addiction sneakers. Also put the heel lift back in and she feels it really does help.       Objective: See treatment diary below      Assessment: Tolerated treatment well. Added lateral step ups and added tband to clamshells. Pt tolerated well with appropriate mm fatigue only. Cont with strength deficits R hip/thigh > L however improving with PREs/since SOC. No increased pain/sx with or following tx. Pt notes less numbness R thigh/leg after exercises. HEP reviewed. Improved gait with heel lift in place with less lateral flex. No complaints after. Pt wishes to trial HEP for 1-2 weeks; Hep reviewed; plan to RE-eval upon returning NV.  Patient demonstrated fatigue post treatment and would benefit from continued PT      Plan: Continue per plan of care.  Progress treatment as tolerated.       Precautions: HTN, DM, anxiety/depression     Re-eval Date:      Date    Visit Count 6/10 7/10 3/10 410 5/10   FOTO NV       Pain In 3/10 3-410 4/10 6-7/10 610   Pain Out 1/10 1-2/10 3-4/10 3-4/10  4/10         Manuals    R LE LAD as abel     3-5' supine R E LAD  8' Supine intermittent LAD R LE                            Neuro Re-Ed        TA training      TA with iso Hip ADD/ABD   15x/3-5\"      20x/3-5\"     TA with BKFO      TA with heel slide   15x/3-5\"     PPT         Pallof " "press             Iso core activation with Pball                         Ther Ex        Nustep- B hip/LE ROM and conditioning  L4 10'  L 4 10'  L 3 10'  L4 10'  L4 10'    HS stretch          Hip flexor stretch   Calf stretch wedge standing 4x30\"       Standing HS stretch  Bottom step  4-5x30\"  Calf stretch wedge standing 4x30\"    Standing HS stretch  Bottom step  4-5x30\"  Calf stretch wedge standing 4x30\"  Calf stretch wedge standing 4x30\"       Standing HS stretch  Bottom step  4-5x30\"  Calf stretch wedge standing 4x30\"       Standing HS stretch  Bottom step  4-5x30\"    Piriformis stretch  Piriformis stretch   4x30\"  Piriformis stretch   Reviewed   Piriformis stretch   4x30\"  Piriformis stretch   4x30\"    LTR              SLRs - standing-->mat table  Supine   SLR flex  0#  2x10 R  3-5\"      SL hip ABD 0# SLR 2x10 R   Supine   SLR flex  0#  2-3x10 R  3-5\"      SL hip ABD 0# SLR 2-3x10 R    Hrs 2-3x10-15    Standing R SLR flex  2-3x10 0#    Standing hip ABD SLR R 0# 2-3x10     Hrs 2x10-15  Standing SLR flex/abd 0#  2x10 ea 3-5\"      SL hip ABD 0# SLR 2x10 R       Hrs 2x10/5\" Supine   SLR flex  0#  2x10 R  3-5\"      SL hip ABD 0# SLR 2x10 R    Clamshells      Mini squats SL  R 0# 2x10       2x10/5\" cues/feedback SL R red  2x10/5\"       HEP   SL R 0# 2x10       2x10/5\" cues/feedback SL  R 0# 2x10       2x10/5\" cues/feedback   Step ups        Leg press  6 in R 2x10     Hrs standing 2x10     Reviewed Trs standing  6 in 2x10 R fwd    6 in 2x10 R lat   NV 6 in R 2x10       NV   Knee ext        HS curls         Ther Activity pt education regarding pathophysiology/pathoanatomy of present pain/sx and condition, role of PT in improving pain/sx and function, pt education regarding activity modification to avoid exacerbation of sx and delayed recovery                         Gait Training                        Modalities prn                                         "

## 2024-11-27 DIAGNOSIS — F33.9 DEPRESSION, RECURRENT (HCC): ICD-10-CM

## 2024-12-05 ENCOUNTER — EVALUATION (OUTPATIENT)
Dept: PHYSICAL THERAPY | Facility: CLINIC | Age: 69
End: 2024-12-05
Payer: MEDICARE

## 2024-12-05 DIAGNOSIS — M51.9 LUMBAR DISC DISEASE: Primary | ICD-10-CM

## 2024-12-05 DIAGNOSIS — M79.604 RIGHT LEG PAIN: ICD-10-CM

## 2024-12-05 DIAGNOSIS — R26.9 GAIT ABNORMALITY: ICD-10-CM

## 2024-12-05 PROCEDURE — 97110 THERAPEUTIC EXERCISES: CPT | Performed by: PHYSICAL MEDICINE & REHABILITATION

## 2024-12-05 NOTE — PROGRESS NOTES
PT Re-Evaluation     Today's date: 2024  Patient name: Hung Doan  : 1955  MRN: [id number]  Referring provider: Eugene Acevedo, *  Dx:   Encounter Diagnosis     ICD-10-CM    1. Lumbar disc disease  M51.9       2. Right leg pain  M79.604       3. Gait abnormality  R26.9                      Assessment  Impairments: abnormal coordination, abnormal gait, abnormal muscle firing, abnormal or restricted ROM, abnormal movement, activity intolerance, impaired balance, impaired physical strength, pain with function, poor posture , participation limitations and activity limitations  Symptom irritability: moderate    Assessment details: Hung has been compliant with attending PT and completing home exercise program since initial eval and reports overall  improvement in pain/sx and function since start of care. She however notes recent flare up of sx this past week, with resultant increase in R leg/thigh pain with lifting R LE; she notes prior to this flare up, her pain was overall improved and she was able to lift the RLE without pain. Hung reports and demonstrates decreased pain, increased strength, improved postural awareness, and improved overall level of function since initial eval, but is still limited compared to prior level of function. She continues with weakness of R hip/thigh, pain in R LE, LLD, and abnormal gait and imbalance. Hung continues with above listed impairments and would benefit from additional skilled PT to address these deficits to return to prior level of function.         Plan  Frequency:1-2x/week   Duration in weeks: 4-6 weeks    POC start date: 2024    POC end date: 25  Therapeutic exercise/activity, neuromuscular reeducation, manual therapy, and modalities.   Patient understands and agrees to plan of care.    Goals  Short Term--4 weeks  1. Patient will demonstrate 2 point decrease in pain levels.- was met until flare up , continue   2. Patient will demonstrate 1/2  "point increase in all deficient MMT scores.- progressing   3. Pt will report ability to sit at least 25% longer /drive longer without increase in RLE s.x - met     Long Term--By Discharge- progressing towards all goals   1. Patient will achieve expected FOTO score.  2. Pt will be I with HEP  3. Pt will be able to sit without increase in sx.   4. Pt will report ability to dance without increase in sx.   Patient's Goal: decrease pain and n/t         Understanding of Dx/Px/POC: good     Prognosis: fair    Plan  Patient would benefit from: skilled physical therapy    Treatment plan discussed with: patient and PTA  Plan details: Modalities may be added or discharged at the discretion of the therapist           Subjective Evaluation    History of Present Illness  Mechanism of injury: trauma  Mechanism of injury: Pt notes a few days ago, she was hanging her [employer] lights at home; stepped down hard onto LLE with pain L lateral leg/ankle that shot up into L thigh; notes this is her \"typical pain\" just \"more of it\". No new pain/sx. Overall notes the pain is improving since. Sees spine and pain next week. Having pain 1* R lower leg /ankle; aching R lateral hip/upper thigh also; worse with attempting to lift R LE such as getting out of the car, lifting leg in/out of bed, and lifting the R leg to go up/down the steps. Pain is better standing. No worse with sitting long periods. R hip bothers her if she lays on R side too long. Still feels weakness R LE > LE; heaviness R LE continues also; no worse than vs SOC per pt. No progressive weakness. No n/t. No bowel/bladder changes. No saddle sx. 6 mo check up with MD in April.   Prior to recent flare up, pt notes she was feeling better; notes she was able to walk and go up steps better; notes her leg was still \"heavy\" but was able to go up the steps without pain. Overall her pain was reducing. Still goes to su chi and gym classes weekly. Feels a lot better when she wears her Freire " Addiction sneakers vs any other sneaker. Is wearing heel lift from PT on R; feels it is helping also. No new pain/sx/complaints overall. Feels she was ready to d/c from PT prior to recent flare up; now feels she needs to cont PT 2* persistent pain and weakness/heaviness R LE since recent flare up of sx.   Quality of life: fair    Patient Goals  Patient goals for therapy: decreased pain, increased strength and increased motion    Pain  Current pain ratin  At best pain ratin  At worst pain ratin  Location: Whole R foot, whole leg from knee down on R, back and outside of R upper leg/thigh  Quality: discomfort, radiating, sharp and dull ache (n/t)  Relieving factors: change in position (standing up)  Aggravating factors: stair climbing (day after increased activity, driving)  Progression: no change (was getting betterprior to recent flare up)    Social Support  Lives with: alone    Employment status: not working (takes care of grandchildren)  Hand dominance: right      Diagnostic Tests  X-ray: abnormal  Treatments  Current treatment: physical therapy  Current treatment comments: ibuoprophen.         Objective     Postural Observations  Seated posture: fair  Standing posture: fair    Additional Postural Observation Details  Standing:  Decreased lumbar lordosis  Slight forward flex at hips   Flexed at knees , L > R  ? LLD  Slight lateral flex to R? Pelvis slightly lower on R vs L in standing?; trialed 1/4in lift on R; pt has been tolerating well with improve posture and gait symmetry with heel lift in   Will cont to assess     Gait:  Slow/antalgic  Forward flex  Flexed at knees  Increased lateral flex with R stance; improved with heel lift  Decreased R stance       Neurological Testing     Sensation     Lumbar   Left   Intact: light touch    Right   Intact: light touch  Diminished: light touch    Reflexes   Left   Patellar (L4): normal (2+)  Achilles (S1): trace (1+)  Clonus sign: negative    Right   Patellar  (L4): normal (2+)  Achilles (S1): trace (1+)  Clonus sign: negative    Additional Neurological Details  Decreased light touch sensitivity R LE outer thigh and outer lower leg and t/o R foot and all 5 toes R , also R posterior distal thigh     Active Range of Motion     Lumbar   Flexion:  WFL  Extension:  with pain Restriction level: moderate  Left lateral flexion:  Restriction level: moderate  Right lateral flexion: Active right lumbar lateral flexion: min-mod.  Restriction level: minimal  Left rotation:  Restriction level: moderate  Right rotation:  Restriction level: moderate    Additional Active Range of Motion Details  Local R L/S pain at end range L/S ext in standing first rep only; no radicular pain/sx; no pain with reps afterwards with no back pain/sx following   Repeated extension in standing- min increase in R knee/distal lateral thigh pain?   Repeated flexion L/S in standing -no effect     Seated R hip flex AROM- * with weakness/difficulty per pt, pain R lateral thigh/leg;  L hip flex WFL  R/L knee AROM WFL seated without pain/sx   Limited R ankle AROM DF/PF 2* noted surgical hx       Strength/Myotome Testing     Lumbar   Left   Heel walk: normal  Toe walk: normal    Right   Heel walk: normal  Toe walk: normal    Left Hip   Planes of Motion   Flexion: 4  Abduction: 4  Adduction: 4+    Right Hip   Planes of Motion   Flexion: 4- (pain)  Abduction: 4 (4--4; spre R hip)  Adduction: 4+    Left Knee   Flexion: 4+  Extension: 4+    Right Knee   Flexion: 4- (Pain R hip/thigh)  Extension: 4 (pain R hip/thigh)    Left Ankle/Foot   Dorsiflexion: 4+  Plantar flexion: 4+    Right Ankle/Foot   Dorsiflexion: 4- (hx R ankle surgery)  Plantar flexion: 4- (hx R ankle surgeyr)    Additional Strength Details  Pt with reported weakness R hip with seated flexion AROM  Hx R ankle surgery/injury with chronic stiffness/weakness since per pt  Pain R hip /thigh with resisted R knee Flex> ext  Seated MMT screen/break test  "EOT      Tests     Lumbar     Left   Negative crossed SLR, passive SLR and slump test.     Right   Negative crossed SLR, passive SLR and slump test.     Additional Tests Details  + [place] in R SLS > in L SLS with hip/glut weakness R > L  Imbalance in R SLS with weakness R foot/ankle also; chronic per pt              Precautions: HTN, DM, anxiety/depression     Re-eval Date:  11/28    Date 11/19 11/21 12/5 11/12 11/14   Visit Count 6/10 7/10 1/10 4/10 5/10   FOTO NV       Pain In 3/10 3-4/10 7-8/10 6-7/10 6/10   Pain Out 1/10 1-2/10 5/10 3-4/10  4/10         Manuals 11/19 11/21 12/5 11/12 11/14   R LE LAD as abel     3-5' supine R E LAD  8' Supine intermittent LAD R LE                            Neuro Re-Ed        TA training      TA with iso Hip ADD/ABD        TA with BKFO      TA with heel slide        PPT         Pallof press             Iso core activation with Pball                         Ther Ex        Nustep- B hip/LE ROM and conditioning  L4 10'  L 4 10'  L 4 10'     Supine sciatic sliders   1x10-15  L4 10'  L4 10'    HS stretch          Hip flexor stretch   Calf stretch wedge standing 4x30\"       Standing HS stretch  Bottom step  4-5x30\"  Calf stretch wedge standing 4x30\"    Standing HS stretch  Bottom step  4-5x30\"  Calf stretch wedge standing 4x30\"       Standing HS stretch  Bottom step  4-5x30\"  Calf stretch wedge standing 4x30\"       Standing HS stretch  Bottom step  4-5x30\"  Calf stretch wedge standing 4x30\"       Standing HS stretch  Bottom step  4-5x30\"    Piriformis stretch  Piriformis stretch   4x30\"  Piriformis stretch   Reviewed  Piriformis stretch   4x30\"  Piriformis stretch   4x30\"  Piriformis stretch   4x30\"    LTR              SLRs - standing-->mat table  Supine   SLR flex  0#  2x10 R  3-5\"      SL hip ABD 0# SLR 2x10 R   Supine   SLR flex  0#  2-3x10 R  3-5\"      SL hip ABD 0# SLR 2-3x10 R    Hrs 2-3x10-15  Supine R SLR flex  2-3x10 0#    SL hip ABD SLR R 0# 2-3x10             Hrs " "3x10-15  Standing SLR flex/abd 0#  2x10 ea 3-5\"      SL hip ABD 0# SLR 2x10 R       Hrs 2x10/5\" Supine   SLR flex  0#  2x10 R  3-5\"      SL hip ABD 0# SLR 2x10 R    Clamshells      Mini squats SL  R 0# 2x10       2x10/5\" cues/feedback SL R red  2x10/5\"       HEP  SL  R 0# 2x10       HEP today  SL R 0# 2x10       2x10/5\" cues/feedback SL  R 0# 2x10       2x10/5\" cues/feedback   Step ups        Leg press  6 in R 2x10     Hrs standing 2x10     Reviewed Trs standing  6 in 2x10 R fwd    6 in 2x10 R lat  6 in 2x10 R fwd    6 in 2x10 R lat  NV 6 in R 2x10       NV   Knee ext        HS curls         Ther Activity pt education regarding pathophysiology/pathoanatomy of present pain/sx and condition, role of PT in improving pain/sx and function, pt education regarding activity modification to avoid exacerbation of sx and delayed recovery                         Gait Training                        Modalities prn                            "

## 2024-12-09 ENCOUNTER — OFFICE VISIT (OUTPATIENT)
Dept: PHYSICAL THERAPY | Facility: CLINIC | Age: 69
End: 2024-12-09
Payer: MEDICARE

## 2024-12-09 DIAGNOSIS — M79.604 RIGHT LEG PAIN: ICD-10-CM

## 2024-12-09 DIAGNOSIS — M51.9 LUMBAR DISC DISEASE: Primary | ICD-10-CM

## 2024-12-09 DIAGNOSIS — R26.9 GAIT ABNORMALITY: ICD-10-CM

## 2024-12-09 PROCEDURE — 97110 THERAPEUTIC EXERCISES: CPT | Performed by: PHYSICAL MEDICINE & REHABILITATION

## 2024-12-09 NOTE — PROGRESS NOTES
Daily Note     Today's date: 2024  Patient name: Hung Doan  : 1955  MRN: [id number]  Referring provider: Eugene Acevedo, *  Dx:   Encounter Diagnosis     ICD-10-CM    1. Lumbar disc disease  M51.9       2. Right leg pain  M79.604       3. Gait abnormality  R26.9                      Subjective: Feeling better overall. Less pain R LE. Still 1* wearing her vivar sneakers which help. No new pain/sx/complaints. Pain still 1* R lower leg/lateral ankle. Better overall. Feels better after exercises.       Objective: See treatment diary below      Assessment: Tolerated treatment well. Progressing well with program. Improving RLE strength with PREs; able to increase reps with less mm fatigue. Added bridges today; appropriate mm fatigue only. Improves stance and ambulation with less lateral flex to R into/with R stance; pt still wearing trial heel lift pad; pt asked for another one for her other shoes; feels it is helping. Cont with strength deficits R thigh/leg > L. Overall less pain RLE after session. HEP reviewed. Patient demonstrated fatigue post treatment and would benefit from continued PT      Plan: Continue per plan of care.  Progress treatment as tolerated.   Potential transition to HEP upcoming as long as pain/sx remain improved.      Precautions: HTN, DM, anxiety/depression     Re-eval Date:      Date    Visit Count 6/10 7/10 1/10 2/10 5/10   FOTO NV       Pain In 3/10 3-4/10 7-8/10 3/10 610   Pain Out 1/10 1-2/10 5/10 1/10 4/10         Manuals    R LE LAD as abel      8' Supine intermittent LAD R LE                            Neuro Re-Ed        TA training      TA with iso Hip ADD/ABD        TA with BKFO      TA with heel slide        PPT         Pallof press             Iso core activation with Pball                         Ther Ex        Nustep- B hip/LE ROM and conditioning  L4 10'  L 4 10'  L 4 10'     Supine sciatic sliders  "  1x10-15  L4 10'  L4 10'    HS stretch          Hip flexor stretch   Calf stretch wedge standing 4x30\"       Standing HS stretch  Bottom step  4-5x30\"  Calf stretch wedge standing 4x30\"    Standing HS stretch  Bottom step  4-5x30\"  Calf stretch wedge standing 4x30\"       Standing HS stretch  Bottom step  4-5x30\"              Standing HS stretch  Supine   4-5x30\"  Calf stretch wedge standing 4x30\"       Standing HS stretch  Bottom step  4-5x30\"    Piriformis stretch  Piriformis stretch   4x30\"  Piriformis stretch   Reviewed  Piriformis stretch   4x30\"  Piriformis stretch   4x30\"  Piriformis stretch   4x30\"    LTR                      SLRs - standing-->mat table  Supine   SLR flex  0#  2x10 R  3-5\"      SL hip ABD 0# SLR 2x10 R   Supine   SLR flex  0#  2-3x10 R  3-5\"      SL hip ABD 0# SLR 2-3x10 R    Hrs 2-3x10-15  Supine R SLR flex  2-3x10 0#    SL hip ABD SLR R 0# 2-3x10             Hrs 3x10-15  Supine   SLR flex  1#  2x10 R  3-5\"      SL hip ABD 1# SLR 2x10 R      Hrs 3x10/5\" Supine   SLR flex  0#  2x10 R  3-5\"      SL hip ABD 0# SLR 2x10 R    Clamshells      Mini squats SL  R 0# 2x10       2x10/5\" cues/feedback SL R red  2x10/5\"       HEP  SL  R 0# 2x10       HEP today  SL R red 2x10        SL  R 0# 2x10       2x10/5\" cues/feedback   Step ups        Leg press  6 in R 2x10     Hrs standing 2x10     Reviewed Trs standing  6 in 2x10 R fwd    6 in 2x10 R lat  6 in 2x10 R fwd    6 in 2x10 R lat  6 in 2x10 R fwd    6 in 2x10 R lat       Leg press NV      Bridges  Supine   2x10/5  Cues  6 in R 2x10       NV   Knee ext        HS curls         Ther Activity pt education regarding pathophysiology/pathoanatomy of present pain/sx and condition, role of PT in improving pain/sx and function, pt education regarding activity modification to avoid exacerbation of sx and delayed recovery                         Gait Training                        Modalities prn                            "

## 2024-12-11 DIAGNOSIS — I10 ESSENTIAL HYPERTENSION: ICD-10-CM

## 2024-12-11 RX ORDER — HYDROCHLOROTHIAZIDE 25 MG/1
25 TABLET ORAL DAILY
Qty: 90 TABLET | Refills: 1 | Status: SHIPPED | OUTPATIENT
Start: 2024-12-11

## 2024-12-12 ENCOUNTER — OFFICE VISIT (OUTPATIENT)
Dept: PHYSICAL THERAPY | Facility: CLINIC | Age: 69
End: 2024-12-12
Payer: MEDICARE

## 2024-12-12 DIAGNOSIS — M79.604 RIGHT LEG PAIN: ICD-10-CM

## 2024-12-12 DIAGNOSIS — R26.9 GAIT ABNORMALITY: ICD-10-CM

## 2024-12-12 DIAGNOSIS — M51.9 LUMBAR DISC DISEASE: Primary | ICD-10-CM

## 2024-12-12 PROCEDURE — 97110 THERAPEUTIC EXERCISES: CPT

## 2024-12-12 NOTE — PROGRESS NOTES
"Daily Note     Today's date: 2024  Patient name: Hung Doan  : 1955  MRN: [id number]  Referring provider: Eugene Acevedo, *  Dx:   Encounter Diagnosis     ICD-10-CM    1. Lumbar disc disease  M51.9       2. Right leg pain  M79.604       3. Gait abnormality  R26.9           Start Time: 1345  Stop Time: 1425  Total time in clinic (min): 40 minutes    Subjective: \"The numbness in my LE is lessening.  The sneakers and therapy have been helping.\"      Objective: See treatment diary below      Assessment: Tolerated treatment well. Able to complete program without incident.  Decreased n/t in R LE noted.  Pt able to complete program and attend exercise classes 3x/wk without symptoms deterring.  Will continue to monitor and progress as able.  Patient demonstrated fatigue post treatment and would benefit from continued PT      Plan: Continue per plan of care.  Progress treatment as tolerated.       Precautions: HTN, DM, anxiety/depression     Re-eval Date:      Date    Visit Count 6/10 7/10 1/10 2/10 3/10   FOTO NV       Pain In 3/10 3-4/10 7-8/10 3/10    Pain Out 1/10 1-2/10 5/10 1/10          Manuals    R LE LAD as abel                                 Neuro Re-Ed        TA training      TA with iso Hip ADD/ABD        TA with BKFO      TA with heel slide        PPT         Pallof press             Iso core activation with Pball                         Ther Ex        Nustep- B hip/LE ROM and conditioning  L4 10'  L 4 10'  L 4 10'     Supine sciatic sliders   1x10-15  L4 10'  L4 10'    HS stretch          Hip flexor stretch   Calf stretch wedge standing 4x30\"       Standing HS stretch  Bottom step  4-5x30\"  Calf stretch wedge standing 4x30\"    Standing HS stretch  Bottom step  4-5x30\"  Calf stretch wedge standing 4x30\"       Standing HS stretch  Bottom step  4-5x30\"              Standing HS stretch  Supine   4-5x30\"  Calf stretch wedge standing " "4x30\"       Standing HS stretch  Bottom step  4-5x30\"    Piriformis stretch  Piriformis stretch   4x30\"  Piriformis stretch   Reviewed  Piriformis stretch   4x30\"  Piriformis stretch   4x30\"  Piriformis stretch   4x30\"    LTR                      SLRs - standing-->mat table  Supine   SLR flex  0#  2x10 R  3-5\"      SL hip ABD 0# SLR 2x10 R   Supine   SLR flex  0#  2-3x10 R  3-5\"      SL hip ABD 0# SLR 2-3x10 R    Hrs 2-3x10-15  Supine R SLR flex  2-3x10 0#    SL hip ABD SLR R 0# 2-3x10             Hrs 3x10-15  Supine   SLR flex  1#  2x10 R  3-5\"      SL hip ABD 1# SLR 2x10 R      Hrs 3x10/5\" Supine   SLR flex  1#  2x10 R  3-5\"      SL hip ABD 1# SLR 2x10 R       Hrs 3x10/5\"   Clamshells      Mini squats SL  R 0# 2x10       2x10/5\" cues/feedback SL R red  2x10/5\"       HEP  SL  R 0# 2x10       HEP today  SL R red 2x10        SL  R grn 2x10       2x10/5\" cues/feedback   Step ups        Leg press  6 in R 2x10     Hrs standing 2x10     Reviewed Trs standing  6 in 2x10 R fwd    6 in 2x10 R lat  6 in 2x10 R fwd    6 in 2x10 R lat  6 in 2x10 R fwd    6 in 2x10 R lat       Leg press NV      Bridges  Supine   2x10/5  Cues  6 in R 2x10       Lat 6 in R 2x10      70# 2x10        Bridges  Supine   3x10/5  Cues    Knee ext        HS curls         Ther Activity pt education regarding pathophysiology/pathoanatomy of present pain/sx and condition, role of PT in improving pain/sx and function, pt education regarding activity modification to avoid exacerbation of sx and delayed recovery                         Gait Training                        Modalities prn                              "

## 2024-12-17 ENCOUNTER — OFFICE VISIT (OUTPATIENT)
Dept: PHYSICAL THERAPY | Facility: CLINIC | Age: 69
End: 2024-12-17
Payer: MEDICARE

## 2024-12-17 DIAGNOSIS — M51.9 LUMBAR DISC DISEASE: Primary | ICD-10-CM

## 2024-12-17 DIAGNOSIS — R26.9 GAIT ABNORMALITY: ICD-10-CM

## 2024-12-17 DIAGNOSIS — M79.604 RIGHT LEG PAIN: ICD-10-CM

## 2024-12-17 PROCEDURE — 97110 THERAPEUTIC EXERCISES: CPT

## 2024-12-17 NOTE — PROGRESS NOTES
"Daily Note     Today's date: 2024  Patient name: Hung Doan  : 1955  MRN: [id number]  Referring provider: Eugene Acevedo, *  Dx:   Encounter Diagnosis     ICD-10-CM    1. Lumbar disc disease  M51.9       2. Right leg pain  M79.604       3. Gait abnormality  R26.9           Start Time: 1045  Stop Time: 1130  Total time in clinic (min): 45 minutes    Subjective: Patient reports 0/10 pain in her back.  Patient mostly c/o heaviness in RLE.  Patient states she would like this to be her last session.  Patient states that she is good with her HEP and feels she can continue at home and going to the gym.      Objective: See treatment diary below      Assessment: Tolerated treatment well.   Patient participated in skilled PT session focused on strengthening, stretching, and ROM.  Patient able to complete exercise program with no issues.  Patient needed some verbal and tactile cues for proper technique with exercises, but overall continues to progress with strengthening RLE.  Patient would like this to be her last session.  Patient demonstrated fatigue post treatment      Plan: Continue per plan of care.      Precautions: HTN, DM, anxiety/depression     Re-eval Date:      Date    Visit Count 4/10 7/10 1/10 2/10 3/10   FOTO        Pain In  3-4/10 7-8/10 3/10    Pain Out  1-2/10 5/10 1/10          Manuals    R LE LAD as abel                                 Neuro Re-Ed        TA training      TA with iso Hip ADD/ABD        TA with BKFO      TA with heel slide        PPT         Pallof press             Iso core activation with Pball                         Ther Ex        Nustep- B hip/LE ROM and conditioning  L4 x 10' L 4 10'  L 4 10'     Supine sciatic sliders   1x10-15  L4 10'  L4 10'    HS stretch          Hip flexor stretch   Calf stretch wedge standing 30\" 4x    Standing HS stretch Bottom step 30\" 4x ea Calf stretch wedge standing " "4x30\"    Standing HS stretch  Bottom step  4-5x30\"  Calf stretch wedge standing 4x30\"       Standing HS stretch  Bottom step  4-5x30\"              Standing HS stretch  Supine   4-5x30\"  Calf stretch wedge standing 4x30\"       Standing HS stretch  Bottom step  4-5x30\"    Piriformis stretch  Piriformis stretch 30\" 4x Piriformis stretch   Reviewed  Piriformis stretch   4x30\"  Piriformis stretch   4x30\"  Piriformis stretch   4x30\"    LTR                      SLRs - standing-->mat table  Supine SLR flex 1# R 3-5\" 2x10      S/L hip Abd 1# R 2x10            Hrs 5\" 3x10   Supine   SLR flex  0#  2-3x10 R  3-5\"      SL hip ABD 0# SLR 2-3x10 R    Hrs 2-3x10-15  Supine R SLR flex  2-3x10 0#    SL hip ABD SLR R 0# 2-3x10             Hrs 3x10-15  Supine   SLR flex  1#  2x10 R  3-5\"      SL hip ABD 1# SLR 2x10 R      Hrs 3x10/5\" Supine   SLR flex  1#  2x10 R  3-5\"      SL hip ABD 1# SLR 2x10 R       Hrs 3x10/5\"   Clamshells      Mini squats Green TB S/L 2x10    2x10  SL R red  2x10/5\"       HEP  SL  R 0# 2x10       HEP today  SL R red 2x10        SL  R grn 2x10       2x10/5\" cues/feedback   Step ups        Leg press  6\" step 2x10 R        70# 2x10            Bridges supine 5\" 2x10  6 in 2x10 R fwd    6 in 2x10 R lat  6 in 2x10 R fwd    6 in 2x10 R lat  6 in 2x10 R fwd    6 in 2x10 R lat       Leg press NV      Bridges  Supine   2x10/5  Cues  6 in R 2x10       Lat 6 in R 2x10      70# 2x10        Bridges  Supine   3x10/5  Cues    Knee ext        HS curls         Ther Activity                        Gait Training                        Modalities                                 "

## 2024-12-18 NOTE — PROGRESS NOTES
"Daily Note     Today's date: 2024  Patient name: Hung Doan  : 1955  MRN: [id number]  Referring provider: Eguene Acevedo, *  Dx:   Encounter Diagnosis     ICD-10-CM    1. Lumbar disc disease  M51.9       2. Right leg pain  M79.604       3. Gait abnormality  R26.9                      Subjective:       Objective: See treatment diary below      Assessment: Tolerated treatment {Tolerated treatment :[id number]}. Patient would benefit from continued PT      Plan: Continue per plan of care.      Precautions: HTN, DM, anxiety/depression     Re-eval Date:      Date    Visit Count 4/10 5/10 1/10 2/10 3/10   FOTO        Pain In   -8/10 3/10    Pain Out   5/10 1/10          Manuals    R LE LAD as abel                                 Neuro Re-Ed        TA training      TA with iso Hip ADD/ABD        TA with BKFO      TA with heel slide        PPT         Pallof press             Iso core activation with Pball                         Ther Ex        Nustep- B hip/LE ROM and conditioning  L4 x 10' L 4 10'  L 4 10'     Supine sciatic sliders   1x10-15  L4 10'  L4 10'    HS stretch          Hip flexor stretch   Calf stretch wedge standing 30\" 4x    Standing HS stretch Bottom step 30\" 4x ea Calf stretch wedge standing 4x30\"    Standing HS stretch  Bottom step  4-5x30\"  Calf stretch wedge standing 4x30\"       Standing HS stretch  Bottom step  4-5x30\"              Standing HS stretch  Supine   4-5x30\"  Calf stretch wedge standing 4x30\"       Standing HS stretch  Bottom step  4-5x30\"    Piriformis stretch  Piriformis stretch 30\" 4x Piriformis stretch   30\"x4 Piriformis stretch   4x30\"  Piriformis stretch   4x30\"  Piriformis stretch   4x30\"    LTR                      SLRs - standing-->mat table  Supine SLR flex 1# R 3-5\" 2x10      S/L hip Abd 1# R 2x10            Hrs 5\" 3x10 Supine   SLR flex  1# R 3-5\" 2x10    SL hip ABD 1# R 2x10            HR 5\" 3x10  " "Supine R SLR flex  2-3x10 0#    SL hip ABD SLR R 0# 2-3x10             Hrs 3x10-15  Supine   SLR flex  1#  2x10 R  3-5\"      SL hip ABD 1# SLR 2x10 R      Hrs 3x10/5\" Supine   SLR flex  1#  2x10 R  3-5\"      SL hip ABD 1# SLR 2x10 R       Hrs 3x10/5\"   Clamshells      Mini squats Green TB S/L 2x10    2x10  GTB   S/L 2x10    2x10 SL  R 0# 2x10       HEP today  SL R red 2x10        SL  R grn 2x10       2x10/5\" cues/feedback   Step ups        Leg press  6\" step 2x10 R        70# 2x10            Bridges supine 5\" 2x10  6\" step  2x10 R        70# 2x10            Bridges supine 5\" 2x10 6 in 2x10 R fwd    6 in 2x10 R lat  6 in 2x10 R fwd    6 in 2x10 R lat       Leg press NV      Bridges  Supine   2x10/5  Cues  6 in R 2x10       Lat 6 in R 2x10      70# 2x10        Bridges  Supine   3x10/5  Cues    Knee ext        HS curls         Ther Activity                        Gait Training                        Modalities                                   "

## 2024-12-19 ENCOUNTER — APPOINTMENT (OUTPATIENT)
Dept: PHYSICAL THERAPY | Facility: CLINIC | Age: 69
End: 2024-12-19
Payer: MEDICARE

## 2025-01-06 ENCOUNTER — RA CDI HCC (OUTPATIENT)
Dept: OTHER | Facility: HOSPITAL | Age: 70
End: 2025-01-06

## 2025-01-06 NOTE — PROGRESS NOTES
HCC coding opportunities          Chart Reviewed number of suggestions sent to Provider: 3     Patients Insurance     Medicare Insurance: Medicare        E11.65  E11.39  E11.36

## 2025-01-13 ENCOUNTER — TELEPHONE (OUTPATIENT)
Age: 70
End: 2025-01-13

## 2025-02-17 ENCOUNTER — OFFICE VISIT (OUTPATIENT)
Dept: FAMILY MEDICINE CLINIC | Facility: CLINIC | Age: 70
End: 2025-02-17
Payer: MEDICARE

## 2025-02-17 VITALS
WEIGHT: 209.2 LBS | DIASTOLIC BLOOD PRESSURE: 88 MMHG | HEART RATE: 72 BPM | SYSTOLIC BLOOD PRESSURE: 140 MMHG | BODY MASS INDEX: 34.85 KG/M2 | TEMPERATURE: 97.4 F | HEIGHT: 65 IN | RESPIRATION RATE: 18 BRPM | OXYGEN SATURATION: 97 %

## 2025-02-17 DIAGNOSIS — I10 PRIMARY HYPERTENSION: Primary | ICD-10-CM

## 2025-02-17 DIAGNOSIS — E11.9 TYPE 2 DIABETES MELLITUS WITHOUT COMPLICATION, WITHOUT LONG-TERM CURRENT USE OF INSULIN (HCC): ICD-10-CM

## 2025-02-17 DIAGNOSIS — M51.9 LUMBAR DISC DISEASE: ICD-10-CM

## 2025-02-17 DIAGNOSIS — E66.01 OBESITY, MORBID (HCC): ICD-10-CM

## 2025-02-17 DIAGNOSIS — R09.81 NASAL CONGESTION: ICD-10-CM

## 2025-02-17 PROCEDURE — 99214 OFFICE O/P EST MOD 30 MIN: CPT | Performed by: FAMILY MEDICINE

## 2025-02-17 PROCEDURE — G2211 COMPLEX E/M VISIT ADD ON: HCPCS | Performed by: FAMILY MEDICINE

## 2025-02-17 NOTE — ASSESSMENT & PLAN NOTE
Above goal but decently controlled.  Continue Januvia with dietary and lifestyle modification.  Lab Results   Component Value Date    HGBA1C 7.2 (A) 10/07/2024       Orders:    Lipid panel; Future    Comprehensive metabolic panel; Future    Hemoglobin A1C; Future    Albumin / creatinine urine ratio; Future

## 2025-02-17 NOTE — ASSESSMENT & PLAN NOTE
Lumbar disc disease with right radiculopathy throughout thigh but no further.  Patient reports pain has resolved with physical therapy and also over-the-counter medication called nervine.  Continue supportive care.  Follow-up as needed.

## 2025-02-17 NOTE — PROGRESS NOTES
Name: Hung Doan      : 1955      MRN: [id number]  Encounter Provider: Eugene Acevedo MD  Encounter Date: 2025   Encounter department: [facility]  :  Assessment & Plan  Primary hypertension  Controlled.  Continue losartan and hydrochlorothiazide with verapamil           Type 2 diabetes mellitus without complication, without long-term current use of insulin (HCC)  Above goal but decently controlled.  Continue Januvia with dietary and lifestyle modification.  Lab Results   Component Value Date    HGBA1C 7.2 (A) 10/07/2024       Orders:    Lipid panel; Future    Comprehensive metabolic panel; Future    Hemoglobin A1C; Future    Albumin / creatinine urine ratio; Future    Lumbar disc disease  Lumbar disc disease with right radiculopathy throughout thigh but no further.  Patient reports pain has resolved with physical therapy and also over-the-counter medication called nervine.  Continue supportive care.  Follow-up as needed.       Obesity, morbid (HCC)  Dietary lifestyle modification.         Nasal congestion  Symptoms likely URI.  Recommended supportive care.  Eye exam normal.  No signs of conjunctivitis.  Recommended artificial tears for lubrication.              History of Present Illness   Patient presents office today to follow-up on chronic conditions.  Has history hypertension and diabetes.  Doing well with treatment plan.  Admits compliance.  No issues with these chronic conditions.  No refills needed today.  Since her last visit she has been doing physical therapy and even saw pain management.  She says her leg pain has gotten much better and has resolved.  From time to time it comes back but she uses this over-the-counter supplement that helped her called nervine.  The physical therapy also really helped that she does the exercises at home.  She has concerns today for nasal congestion and itchy right eye.  Her granddaughter had pinkeye and she just wants to make sure  "that she does not have it.  No eye pain.  No visual disturbances.  Denies any pinkeye.  No fevers.  No chills.  No COVID or flu contact.      Review of Systems   All other systems reviewed and are negative.      Objective   /88 (BP Location: Left arm, Patient Position: Sitting, Cuff Size: Large)   Pulse 72   Temp (!) 97.4 °F (36.3 °C) (Tympanic)   Resp 18   Ht 5' 5\" (1.651 m)   Wt 94.9 kg (209 lb 3.2 oz)   LMP  (LMP Unknown)   SpO2 97%   BMI 34.81 kg/m²      Physical Exam  Vitals and nursing note reviewed.   Constitutional:       General: She is not in acute distress.     Appearance: Normal appearance. She is well-developed. She is not ill-appearing, toxic-appearing or diaphoretic.   HENT:      Head: Normocephalic and atraumatic.      Nose: Congestion present.      Mouth/Throat:      Mouth: Mucous membranes are moist.      Pharynx: No oropharyngeal exudate or posterior oropharyngeal erythema.   Eyes:      General:         Right eye: No discharge.         Left eye: No discharge.      Extraocular Movements: Extraocular movements intact.      Conjunctiva/sclera: Conjunctivae normal.   Cardiovascular:      Rate and Rhythm: Normal rate and regular rhythm.      Pulses: Normal pulses.      Heart sounds: Normal heart sounds.   Pulmonary:      Effort: Pulmonary effort is normal.      Breath sounds: Normal breath sounds.   Musculoskeletal:         General: No swelling, tenderness, deformity or signs of injury. Normal range of motion.      Right lower leg: No edema.      Left lower leg: No edema.   Skin:     General: Skin is warm and dry.      Capillary Refill: Capillary refill takes less than 2 seconds.   Neurological:      Mental Status: She is alert and oriented to person, place, and time.   Psychiatric:         Mood and Affect: Mood normal.         Behavior: Behavior normal.         Thought Content: Thought content normal.         Judgment: Judgment normal.         "

## 2025-03-13 DIAGNOSIS — F33.9 DEPRESSION, RECURRENT (HCC): ICD-10-CM

## 2025-03-13 DIAGNOSIS — I10 ESSENTIAL HYPERTENSION: ICD-10-CM

## 2025-03-13 NOTE — TELEPHONE ENCOUNTER
Reason for call:   [x] Refill-transferring these scripts to [facility] mail order pharmacy   [] Prior Auth  [] Other:     Office:   [x] PCP/Provider - [facility]  Authorized By: Eugene Acevedo MD  [] Specialty/Provider -     losartan (COZAAR) 100 MG tablet Take 1 tablet by mouth once daily     sertraline (ZOLOFT) 50 mg tablet TAKE 1 TABLET BY MOUTH IN THE MORNING     verapamil (CALAN-SR) 120 mg CR tablet  TAKE 1 TABLET BY MOUTH ONCE DAILY AT BEDTIME     [facility]     Local Pharmacy   Does the patient have enough for 3 days?   [] Yes   [] No - Send as HP to POD    Mail Away Pharmacy   Does the patient have enough for 10 days?   [x] Yes   [] No - Send as HP to POD

## 2025-03-14 RX ORDER — LOSARTAN POTASSIUM 100 MG/1
100 TABLET ORAL DAILY
Qty: 90 TABLET | Refills: 1 | Status: SHIPPED | OUTPATIENT
Start: 2025-03-14

## 2025-03-14 RX ORDER — VERAPAMIL HYDROCHLORIDE 120 MG/1
120 TABLET, FILM COATED, EXTENDED RELEASE ORAL
Qty: 90 TABLET | Refills: 1 | Status: SHIPPED | OUTPATIENT
Start: 2025-03-14

## 2025-04-08 DIAGNOSIS — J01.41 ACUTE RECURRENT PANSINUSITIS: ICD-10-CM

## 2025-04-08 DIAGNOSIS — J30.1 ALLERGIC RHINITIS DUE TO POLLEN, UNSPECIFIED SEASONALITY: ICD-10-CM

## 2025-04-10 RX ORDER — AZELASTINE HYDROCHLORIDE 137 UG/1
1 SPRAY, METERED NASAL 2 TIMES DAILY
Qty: 30 ML | Refills: 1 | Status: SHIPPED | OUTPATIENT
Start: 2025-04-10 | End: 2025-04-17 | Stop reason: SDUPTHER

## 2025-04-10 NOTE — TELEPHONE ENCOUNTER
Azelastine HCl 137 MCG/SPRAY SOLN          Sig: N/A    Disp: 30 mL    Refills: 0    Start: 4/8/2025    Class: Normal    Non-formulary For: Acute recurrent pansinusitis; Allergic rhinitis due to pollen, unspecified seasonality    Last ordered: 2 years ago (12/27/2022) by Eugene Acevedo MD    Ear, Nose, and Throat: Nasal Preparations - Antiallergy Qifjto26/10/2025 10:35 AM   Protocol Details Valid encounter within last 12 months   [employer] Embedded Ebjlagr53/10/2025 10:35 AM   The requested medication is not on the active medication list.      To be filled at: [facility] [address] [place] [address] - [address]

## 2025-04-10 NOTE — TELEPHONE ENCOUNTER
Patient called to request a refill for their Azelastine Nasal [place] advised a refill was requested on 4/9/25 and is pending approval. Patient verbalized understanding and is in agreement.     Does the patient have enough for 3 days?   [x] Yes   [] No - Send as HP to POD      Patient has been having severe congestion and was using to help, current bottle is almost empty.

## 2025-04-14 DIAGNOSIS — E11.9 TYPE 2 DIABETES MELLITUS WITHOUT COMPLICATION, WITHOUT LONG-TERM CURRENT USE OF INSULIN (HCC): ICD-10-CM

## 2025-04-16 RX ORDER — SITAGLIPTIN 50 MG/1
50 TABLET, FILM COATED ORAL DAILY
Qty: 90 TABLET | Refills: 0 | Status: SHIPPED | OUTPATIENT
Start: 2025-04-16

## 2025-04-17 ENCOUNTER — TELEPHONE (OUTPATIENT)
Dept: FAMILY MEDICINE CLINIC | Facility: CLINIC | Age: 70
End: 2025-04-17

## 2025-04-17 DIAGNOSIS — J01.41 ACUTE RECURRENT PANSINUSITIS: ICD-10-CM

## 2025-04-17 DIAGNOSIS — J30.1 ALLERGIC RHINITIS DUE TO POLLEN, UNSPECIFIED SEASONALITY: ICD-10-CM

## 2025-04-17 RX ORDER — AZELASTINE HYDROCHLORIDE 137 UG/1
1 SPRAY, METERED NASAL 2 TIMES DAILY
Qty: 30 ML | Refills: 1 | Status: SHIPPED | OUTPATIENT
Start: 2025-04-17

## 2025-04-17 NOTE — TELEPHONE ENCOUNTER
Patient called requesting refill for Azelastine HCl 137 MCG/SPRAY SOLN . Patient made aware medication was refilled on Receipt confirmed by pharmacy (2025  2:09 PM EDT  for 30 ml with 1 refills to [facility] pharmacy. Patient instructed to contact the pharmacy to obtain refills of medication. Patient verbalized understanding.       Patient stated she got a [email or web address] message that the med was approved but not sent to the pharmacy.  I do not see anything showing not sent.     Azelastine HCl 137 MCG/SPRAY SOLN       Si spray into each nostril 2 (two) times a day       Sent to pharmacy as: Azelastine HCl 137 MCG/SPRAY Nasal Solution       Class: Normal       Route: Nasal       E-Prescribing Status: Receipt confirmed by pharmacy (2025  2:09 PM EDT)             I advised patient of the Ereceipt and to give them some time to fill

## 2025-04-17 NOTE — TELEPHONE ENCOUNTER
Reason for call:   [x] Refill   [] Prior Auth  [x] Other: Not a dup- pharmacy told pt provider cancelled Rx     Office:   [x] PCP/Provider -   [] Specialty/Provider -     Medication: Azelastine HCl 137 MCG/SPRAY SOLN  1 spray into each nostril 2 (two) times a day       Pharmacy:  [facility] [address] [place] [address]      Local Pharmacy   Does the patient have enough for 3 days?   [] Yes   [x] No - Send as HP to POD    Mail Away Pharmacy   Does the patient have enough for 10 days?   [] Yes   [] No - Send as HP to POD

## 2025-06-09 DIAGNOSIS — I10 ESSENTIAL HYPERTENSION: ICD-10-CM

## 2025-06-10 RX ORDER — HYDROCHLOROTHIAZIDE 25 MG/1
25 TABLET ORAL DAILY
Qty: 30 TABLET | Refills: 0 | Status: SHIPPED | OUTPATIENT
Start: 2025-06-10

## 2025-06-13 NOTE — TELEPHONE ENCOUNTER
"Pt called refill line to find out why she only got a 30 day supply. I explained it was a courtesy refill due to pt not getting updated blood work. Pt stated \"ok I probably will not get the blood work done in the next 30 days\". I explained that next month if the blood work is not done they might not refill the medication. Pt then stated \" well then I guess I don't take the medication\" and then pt hung up.  "

## 2025-07-07 ENCOUNTER — APPOINTMENT (OUTPATIENT)
Dept: LAB | Facility: CLINIC | Age: 70
End: 2025-07-07
Attending: FAMILY MEDICINE
Payer: MEDICARE

## 2025-07-07 ENCOUNTER — OFFICE VISIT (OUTPATIENT)
Dept: URGENT CARE | Facility: CLINIC | Age: 70
End: 2025-07-07
Payer: MEDICARE

## 2025-07-07 VITALS
RESPIRATION RATE: 18 BRPM | OXYGEN SATURATION: 97 % | HEART RATE: 61 BPM | DIASTOLIC BLOOD PRESSURE: 81 MMHG | SYSTOLIC BLOOD PRESSURE: 145 MMHG | TEMPERATURE: 97.6 F

## 2025-07-07 DIAGNOSIS — E11.9 TYPE 2 DIABETES MELLITUS WITHOUT COMPLICATION, WITHOUT LONG-TERM CURRENT USE OF INSULIN (HCC): ICD-10-CM

## 2025-07-07 DIAGNOSIS — H61.23 BILATERAL IMPACTED CERUMEN: Primary | ICD-10-CM

## 2025-07-07 LAB
ALBUMIN SERPL BCG-MCNC: 3.9 G/DL (ref 3.5–5)
ALP SERPL-CCNC: 124 U/L (ref 34–104)
ALT SERPL W P-5'-P-CCNC: 35 U/L (ref 7–52)
ANION GAP SERPL CALCULATED.3IONS-SCNC: 8 MMOL/L (ref 4–13)
AST SERPL W P-5'-P-CCNC: 27 U/L (ref 13–39)
BILIRUB SERPL-MCNC: 0.98 MG/DL (ref 0.2–1)
BUN SERPL-MCNC: 14 MG/DL (ref 5–25)
CALCIUM SERPL-MCNC: 9.8 MG/DL (ref 8.4–10.2)
CHLORIDE SERPL-SCNC: 103 MMOL/L (ref 96–108)
CHOLEST SERPL-MCNC: 165 MG/DL (ref ?–200)
CO2 SERPL-SCNC: 28 MMOL/L (ref 21–32)
CREAT SERPL-MCNC: 0.65 MG/DL (ref 0.6–1.3)
CREAT UR-MCNC: 158.2 MG/DL
EST. AVERAGE GLUCOSE BLD GHB EST-MCNC: 169 MG/DL
GFR SERPL CREATININE-BSD FRML MDRD: 90 ML/MIN/1.73SQ M
GLUCOSE P FAST SERPL-MCNC: 186 MG/DL (ref 65–99)
HBA1C MFR BLD: 7.5 %
HDLC SERPL-MCNC: 45 MG/DL
LDLC SERPL CALC-MCNC: 92 MG/DL (ref 0–100)
MICROALBUMIN UR-MCNC: 11.6 MG/L
MICROALBUMIN/CREAT 24H UR: 7 MG/G CREATININE (ref 0–30)
NONHDLC SERPL-MCNC: 120 MG/DL
POTASSIUM SERPL-SCNC: 4.4 MMOL/L (ref 3.5–5.3)
PROT SERPL-MCNC: 7.1 G/DL (ref 6.4–8.4)
SODIUM SERPL-SCNC: 139 MMOL/L (ref 135–147)
TRIGL SERPL-MCNC: 138 MG/DL (ref ?–150)

## 2025-07-07 PROCEDURE — 82570 ASSAY OF URINE CREATININE: CPT

## 2025-07-07 PROCEDURE — 80053 COMPREHEN METABOLIC PANEL: CPT

## 2025-07-07 PROCEDURE — 83036 HEMOGLOBIN GLYCOSYLATED A1C: CPT

## 2025-07-07 PROCEDURE — 36415 COLL VENOUS BLD VENIPUNCTURE: CPT

## 2025-07-07 PROCEDURE — 99213 OFFICE O/P EST LOW 20 MIN: CPT

## 2025-07-07 PROCEDURE — 69210 REMOVE IMPACTED EAR WAX UNI: CPT

## 2025-07-07 PROCEDURE — 80061 LIPID PANEL: CPT

## 2025-07-07 PROCEDURE — G0463 HOSPITAL OUTPT CLINIC VISIT: HCPCS

## 2025-07-07 PROCEDURE — 82043 UR ALBUMIN QUANTITATIVE: CPT

## 2025-07-07 NOTE — PROGRESS NOTES
[facility]  Name: Hung Daon      : 1955      MRN: [id number]  Encounter Provider: KASHIF Hinson  Encounter Date: 2025   Encounter department: [facility] [place]  :  Assessment & Plan  Bilateral impacted cerumen    Orders:  •  Ear cerumen removal    Cerumen removal performed in office.  Patient instructed to avoid Q-tip use and to continue with OTC Debrox drops.  Instructed patient to follow-up with her ENT this week. Instructed patient to follow-up with PCP for no improvement or worsening of symptoms.  Patient educated on red flag symptoms and when to proceed to the ED.  Patient agreeable and understands current treatment plan.       Patient Instructions  Follow up with PCP in 3-5 days.  Proceed to  ER if symptoms worsen.    If tests are performed, our office will contact you with results only if changes need to made to the care plan discussed with you at the visit. You can review your full results on [facility].    Chief Complaint:   Chief Complaint   Patient presents with   • Earache     Both ears blocked      History of Present Illness {?Quick Links Encounters * My Last Note * Last Note in Specialty * Snapshot * Since Last Visit * History :31891}  70-year-old female presents to the clinic for evaluation of bilateral ear wax impaction.  Patient reports she went in for a hearing test today and they sent her here for evaluation due to a bilateral cerumen impaction.  Patient reports she has a chronic history of cerumen impactions and she uses Debrox drops.  Patient also reports when she is in the shower she takes a shower head and tries to manually irrigate her ears out.  Patient denies any other symptoms at this time and denies any ear pain or drainage.          Review of Systems   Constitutional:  Negative for chills and fever.   HENT:  Negative for congestion, ear discharge, ear pain, rhinorrhea and sore throat.         Blocked ears   Respiratory:  Negative for  cough and shortness of breath.    Cardiovascular:  Negative for chest pain and palpitations.   Gastrointestinal:  Negative for diarrhea, nausea and vomiting.   Musculoskeletal:  Negative for arthralgias and myalgias.   Neurological:  Negative for dizziness and headaches.   All other systems reviewed and are negative.    Past Medical History   Past Medical History[1]  Past Surgical History[2]  Family History[3]  she reports that she has never smoked. She has been exposed to tobacco smoke. She has never used smokeless tobacco. She reports current alcohol use of about 1.0 standard drink of alcohol per week. She reports that she does not use drugs.  Current Outpatient Medications   Medication Instructions   • Azelastine HCl 137 MCG/SPRAY SOLN 1 spray, Nasal, 2 times daily   • Bimatoprost (LUMIGAN OP) 1 drop, Daily at bedtime   • cetirizine (ZYRTEC) 10 mg, Daily   • cholecalciferol (VITAMIN D3) 400 Units, Daily   • cyanocobalamin (VITAMIN B-12) 100 mcg tablet Daily   • famotidine (PEPCID) 20 mg, Daily PRN   • fluticasone (FLONASE) 50 mcg/act nasal spray 2 sprays, Nasal, Daily   • hydroCHLOROthiazide 25 mg, Oral, Daily   • Januvia 50 mg, Oral, Daily   • losartan (COZAAR) 100 mg, Oral, Daily   • Metamucil Fiber CHEW Chew   • Multiple Vitamins-Minerals (MULTIVITAMIN ADULT PO) Take by mouth   • sertraline (ZOLOFT) 50 mg, Oral, Every morning   • verapamil (CALAN-SR) 120 mg, Oral, Daily at bedtime   Allergies[4]     Objective {?Quick Links Trend Vitals * Enter New Vitals * Results Review * Timeline (Adult) * Labs * Imaging * Cardiology * Procedures * Lung Cancer Screening * Surgical eConsent :48665}  /81   Pulse 61   Temp 97.6 °F (36.4 °C)   Resp 18   LMP  (LMP Unknown)   SpO2 97%      Physical Exam  Vitals and nursing note reviewed.   Constitutional:       Appearance: Normal appearance.   HENT:      Head: Normocephalic and atraumatic.      Right Ear: Tympanic membrane, ear canal and external ear normal. There is  impacted cerumen. Tympanic membrane is not perforated, erythematous or bulging.      Left Ear: Tympanic membrane, ear canal and external ear normal. There is impacted cerumen. Tympanic membrane is not perforated, erythematous or bulging.      Nose: Nose normal. No congestion or rhinorrhea.      Mouth/Throat:      Mouth: Mucous membranes are moist.      Pharynx: Oropharynx is clear. No oropharyngeal exudate or posterior oropharyngeal erythema.     Cardiovascular:      Rate and Rhythm: Normal rate and regular rhythm.      Heart sounds: Normal heart sounds.   Pulmonary:      Effort: Pulmonary effort is normal.      Breath sounds: Normal breath sounds.     Skin:     General: Skin is warm and dry.     Neurological:      General: No focal deficit present.      Mental Status: She is alert and oriented to person, place, and time.     Psychiatric:         Mood and Affect: Mood normal.         Behavior: Behavior normal.       Ear cerumen removal    Date/Time: 7/7/2025 9:30 AM    Performed by: KASHIF Hinson  Authorized by: KASHIF Hinson    Universal Protocol:  procedure performed by consultantConsent: Verbal consent obtained  Risks and benefits: risks, benefits and alternatives were discussed  Consent given by: patient  Patient understanding: patient states understanding of the procedure being performed  Patient consent: the patient's understanding of the procedure matches consent given  Required items: required blood products, implants, devices, and special equipment available  Patient identity confirmed: verbally with patient    Patient location:  Clinic  Indications / Diagnosis:  Bilateral cerumen impaction  Procedure details:     Local anesthetic:  None    Location:  Both ears    Procedure type: irrigation with instrumentation      Instrumentation: curette      Approach:  External  Post-procedure details:     Complication:  None    Hearing quality:  Improved    Patient tolerance of procedure:  Tolerated  "well, no immediate complications          Portions of the record may have been created with voice recognition software.  Occasional wrong word or \"sound a like\" substitutions may have occurred due to the inherent limitations of voice recognition software.  Read the chart carefully and recognize, using context, where substitutions have occurred.           [1]  Past Medical History:  Diagnosis Date   • Anxiety    • Carcinoma (HCC)     carcinoma of the skin   • Chronic pain disorder     Right ankle fracture   • Depression    • Diabetes mellitus (HCC)     Type 2   • Diverticulosis    • GERD (gastroesophageal reflux disease)    • Heart murmur     mitral valve insufficiency   • Hypertension    • Tenosynovitis, de Quervain    • Vertigo     last assessed 8/7/2012   [2]  Past Surgical History:  Procedure Laterality Date   • CHOLECYSTECTOMY     • COLONOSCOPY     • COLPOSCOPY W/ BIOPSY / CURETTAGE      cervix. last assessed 8/11/1998   • ENDOMETRIAL BIOPSY      onset 4/13/2011   • FOOT SURGERY      R foot and ankle fracture, fused with screws   • HYSTEROSCOPY W/ POLYPECTOMY     • LA COLONOSCOPY FLX DX W/COLLJ SPEC WHEN PFRMD N/A 6/20/2017    Procedure: COLONOSCOPY;  Surgeon: Darshana Dennis MD;  Location: [facility];  Service: General   • TONSILLECTOMY     [3]  Family History  Problem Relation Name Age of Onset   • Breast cancer Mother          Breast surgery mastectomy   • Dementia Mother     • Colon cancer Father     • Liver cancer Father     [4]  No Known Allergies"

## 2025-07-09 DIAGNOSIS — E11.9 TYPE 2 DIABETES MELLITUS WITHOUT COMPLICATION, WITHOUT LONG-TERM CURRENT USE OF INSULIN (HCC): ICD-10-CM

## 2025-07-09 DIAGNOSIS — I10 ESSENTIAL HYPERTENSION: ICD-10-CM

## 2025-07-09 RX ORDER — SITAGLIPTIN 50 MG/1
50 TABLET, FILM COATED ORAL DAILY
Qty: 90 TABLET | Refills: 0 | Status: SHIPPED | OUTPATIENT
Start: 2025-07-09

## 2025-07-09 RX ORDER — HYDROCHLOROTHIAZIDE 25 MG/1
25 TABLET ORAL DAILY
Qty: 90 TABLET | Refills: 0 | Status: SHIPPED | OUTPATIENT
Start: 2025-07-09

## 2025-08-07 ENCOUNTER — OFFICE VISIT (OUTPATIENT)
Dept: FAMILY MEDICINE CLINIC | Facility: CLINIC | Age: 70
End: 2025-08-07
Payer: MEDICARE

## 2025-08-07 PROBLEM — H25.9 AGE-RELATED CATARACT OF BOTH EYES, UNSPECIFIED AGE-RELATED CATARACT TYPE: Status: ACTIVE | Noted: 2025-08-07
